# Patient Record
Sex: MALE | Race: WHITE | NOT HISPANIC OR LATINO | Employment: OTHER | URBAN - METROPOLITAN AREA
[De-identification: names, ages, dates, MRNs, and addresses within clinical notes are randomized per-mention and may not be internally consistent; named-entity substitution may affect disease eponyms.]

---

## 2018-05-04 ENCOUNTER — OFFICE VISIT CONVERTED (OUTPATIENT)
Dept: UROLOGY | Facility: CLINIC | Age: 73
End: 2018-05-04
Attending: UROLOGY

## 2018-05-07 ENCOUNTER — CONVERSION ENCOUNTER (OUTPATIENT)
Dept: SURGERY | Facility: CLINIC | Age: 73
End: 2018-05-07

## 2018-05-14 ENCOUNTER — OFFICE VISIT CONVERTED (OUTPATIENT)
Dept: SURGERY | Facility: CLINIC | Age: 73
End: 2018-05-14
Attending: PHYSICIAN ASSISTANT

## 2018-05-23 ENCOUNTER — PROCEDURE VISIT CONVERTED (OUTPATIENT)
Dept: UROLOGY | Facility: CLINIC | Age: 73
End: 2018-05-23
Attending: UROLOGY

## 2018-05-25 ENCOUNTER — TRANSCRIBE ORDERS (OUTPATIENT)
Dept: ADMINISTRATIVE | Facility: HOSPITAL | Age: 73
End: 2018-05-25

## 2018-05-25 DIAGNOSIS — Z79.52 CURRENT CHRONIC USE OF SYSTEMIC STEROIDS: Primary | ICD-10-CM

## 2018-05-30 PROBLEM — Z79.52 LONG TERM CURRENT USE OF SYSTEMIC STEROIDS: Status: ACTIVE | Noted: 2018-05-30

## 2018-06-01 ENCOUNTER — HOSPITAL ENCOUNTER (OUTPATIENT)
Dept: INFUSION THERAPY | Facility: HOSPITAL | Age: 73
Discharge: HOME | End: 2018-06-01
Attending: INTERNAL MEDICINE | Admitting: INTERNAL MEDICINE

## 2018-06-01 VITALS
DIASTOLIC BLOOD PRESSURE: 81 MMHG | RESPIRATION RATE: 12 BRPM | HEIGHT: 78 IN | TEMPERATURE: 97.7 F | OXYGEN SATURATION: 100 % | SYSTOLIC BLOOD PRESSURE: 158 MMHG | HEART RATE: 56 BPM | BODY MASS INDEX: 21.06 KG/M2 | WEIGHT: 182 LBS

## 2018-06-01 DIAGNOSIS — Z79.52 LONG TERM CURRENT USE OF SYSTEMIC STEROIDS: ICD-10-CM

## 2018-06-01 LAB
CORTIS SERPL-MCNC: 13.63 MCG/DL
CORTIS SERPL-MCNC: 14.57 MCG/DL
CORTIS SERPL-MCNC: 9.73 MCG/DL

## 2018-06-01 PROCEDURE — 96374 THER/PROPH/DIAG INJ IV PUSH: CPT

## 2018-06-01 PROCEDURE — 82533 TOTAL CORTISOL: CPT | Performed by: INTERNAL MEDICINE

## 2018-06-01 PROCEDURE — 36415 COLL VENOUS BLD VENIPUNCTURE: CPT

## 2018-06-01 PROCEDURE — 25010000002 COSYNTROPIN PER 0.25 MG: Performed by: INTERNAL MEDICINE

## 2018-06-01 RX ORDER — METOPROLOL TARTRATE 50 MG/1
50 TABLET, FILM COATED ORAL 2 TIMES DAILY
COMMUNITY
End: 2022-02-03 | Stop reason: SDUPTHER

## 2018-06-01 RX ORDER — COSYNTROPIN 0.25 MG/ML
0.25 INJECTION, POWDER, FOR SOLUTION INTRAMUSCULAR; INTRAVENOUS ONCE
Status: CANCELLED | OUTPATIENT
Start: 2018-06-01

## 2018-06-01 RX ORDER — WARFARIN SODIUM 5 MG/1
5 TABLET ORAL
COMMUNITY
End: 2021-09-10

## 2018-06-01 RX ORDER — COSYNTROPIN 0.25 MG/ML
0.25 INJECTION, POWDER, FOR SOLUTION INTRAMUSCULAR; INTRAVENOUS ONCE
Status: COMPLETED | OUTPATIENT
Start: 2018-06-01 | End: 2018-06-01

## 2018-06-01 RX ORDER — FOLIC ACID 1 MG/1
1 TABLET ORAL DAILY
COMMUNITY
End: 2022-02-03 | Stop reason: ALTCHOICE

## 2018-06-01 RX ORDER — WARFARIN SODIUM 2.5 MG/1
2.5 TABLET ORAL
COMMUNITY
End: 2021-09-10

## 2018-06-01 RX ORDER — PREDNISONE 1 MG/1
3 TABLET ORAL DAILY
COMMUNITY
End: 2021-08-30

## 2018-06-01 RX ADMIN — COSYNTROPIN 0.25 MG: 0.25 INJECTION, POWDER, FOR SOLUTION INTRAMUSCULAR; INTRAVENOUS at 08:20

## 2018-06-01 NOTE — PROGRESS NOTES
Patient tolerated stim test without complaints. Patient ambulatory, D/C'd from [facility] at 0944 with spouse.

## 2018-06-01 NOTE — PATIENT INSTRUCTIONS
ACTH Stimulation Test  Why am I having this test?  The adrenocorticotropic hormone (ACTH) stimulation test indirectly shows how well your adrenal glands are working. ACTH is a hormone that is produced by a gland in your brain called the pituitary gland. ACTH stimulates your two adrenal glands, which are located above each kidney. The adrenal glands produce hormones that are released into the blood. One of these hormones is cortisol. Cortisol helps your body to respond to stress. If your adrenal glands are not responding to ACTH properly, you may have too much or too little cortisol.  What kind of sample is taken?  Two or more blood samples are required for this test. Blood samples are usually collected by inserting a needle into a vein. Cortisol will be measured in the first blood sample to provide a baseline level.  After the first blood sample has been collected, you will be given cosyntropin. Cosyntropin is similar to ACTH and should cause the adrenal glands to release cortisol. Cosyntropin is usually given through an IV tube. It could also be given as an intramuscular (IM) injection. At specified intervals after receiving the cosyntropin, you will have one or more blood samples taken to measure your cortisol levels. The test results will be compared to show the amount of cortisol in your blood before and after you were given cosyntropin.  How do I prepare for this test?  Do not eat or drink anything after midnight on the night before the test or as directed by your health care provider.  What are the reference values?  Reference values are considered healthy values established after testing a large group of healthy people. Reference values may vary among different people, labs, and hospitals. It is your responsibility to obtain your test results. Ask the lab or department performing the test when and how you will get your results.  The following are reference values for the various ACTH stimulation  tests:  · Rapid test: cortisol levels increase greater than 7 mg/dL above baseline.  · 24-hour test: cortisol levels greater than 40 mcg/dL.  · 3-day test: cortisol levels greater than 40 mcg/dL.  What do the results mean?  Results outside of the reference value may indicate:  · Cushing syndrome.  · Adrenal insufficiency.  Talk with your health care provider to discuss your results, treatment options, and if necessary, the need for more tests. Talk with your health care provider if you have any questions about your results.  Talk with your health care provider to discuss your results, treatment options, and if necessary, the need for more tests. Talk with your health care provider if you have any questions about your results.  This information is not intended to replace advice given to you by your health care provider. Make sure you discuss any questions you have with your health care provider.  Document Released: 01/20/2012 Document Revised: 08/21/2017 Document Reviewed: 07/29/2015  [employer] Interactive Patient Education © 2017 [employer]

## 2018-06-21 ENCOUNTER — OFFICE VISIT (OUTPATIENT)
Dept: ORTHOPEDIC SURGERY | Facility: CLINIC | Age: 73
End: 2018-06-21

## 2018-06-21 VITALS — WEIGHT: 182 LBS | BODY MASS INDEX: 21.49 KG/M2 | HEIGHT: 77 IN

## 2018-06-21 DIAGNOSIS — M25.511 CHRONIC RIGHT SHOULDER PAIN: Primary | ICD-10-CM

## 2018-06-21 DIAGNOSIS — IMO0002 BURSITIS/TENDONITIS, SHOULDER: ICD-10-CM

## 2018-06-21 DIAGNOSIS — G89.29 CHRONIC RIGHT SHOULDER PAIN: Primary | ICD-10-CM

## 2018-06-21 PROCEDURE — 99204 OFFICE O/P NEW MOD 45 MIN: CPT | Performed by: ORTHOPAEDIC SURGERY

## 2018-06-21 PROCEDURE — 73030 X-RAY EXAM OF SHOULDER: CPT | Performed by: ORTHOPAEDIC SURGERY

## 2018-06-21 PROCEDURE — 20610 DRAIN/INJ JOINT/BURSA W/O US: CPT | Performed by: ORTHOPAEDIC SURGERY

## 2018-06-21 RX ORDER — LIDOCAINE HYDROCHLORIDE 10 MG/ML
4 INJECTION, SOLUTION EPIDURAL; INFILTRATION; INTRACAUDAL; PERINEURAL
Status: COMPLETED | OUTPATIENT
Start: 2018-06-21 | End: 2018-06-21

## 2018-06-21 RX ORDER — METHYLPREDNISOLONE ACETATE 80 MG/ML
80 INJECTION, SUSPENSION INTRA-ARTICULAR; INTRALESIONAL; INTRAMUSCULAR; SOFT TISSUE
Status: COMPLETED | OUTPATIENT
Start: 2018-06-21 | End: 2018-06-21

## 2018-06-21 RX ADMIN — METHYLPREDNISOLONE ACETATE 80 MG: 80 INJECTION, SUSPENSION INTRA-ARTICULAR; INTRALESIONAL; INTRAMUSCULAR; SOFT TISSUE at 11:34

## 2018-06-21 RX ADMIN — LIDOCAINE HYDROCHLORIDE 4 ML: 10 INJECTION, SOLUTION EPIDURAL; INFILTRATION; INTRACAUDAL; PERINEURAL at 11:34

## 2018-06-21 NOTE — PROGRESS NOTES
New Shoulder      Patient: Walker Ordoñez        YOB: 1945    Medical Record Number: [id number]        Chief Complaints: Right shoulder pain  Chief Complaint   Patient presents with   • Right Shoulder - Pain, Establish Care            History of Present Illness: This is a  72 y.o. male who presents with Complaints of right shoulder pain he is right-hand-dominant spent ongoing 2-3 months no history injury change in activity I'll operate on his left shoulder approximately 10 years ago it is doing great he has night pain which is his biggest complaint symptoms are moderate constant aching clicking popping snapping worse with driving and sleeping he is retired his past medical history marked for DVT hypertension and polymyalgia rheumatica          Allergies:   Allergies   Allergen Reactions   • Codeine GI Intolerance and Nausea Only     Sick at stomach       Medications:   Home Medications:  Current Outpatient Prescriptions on File Prior to Visit   Medication Sig   • folic acid (FOLVITE) 1 MG tablet Take 1 mg by mouth Daily.   • methotrexate 2.5 MG tablet Take 15 mg by mouth 1 (One) Time Per Week.   • metoprolol tartrate (LOPRESSOR) 50 MG tablet Take 50 mg by mouth 2 (Two) Times a Day.   • Multiple Vitamin (MULTI-VITAMIN DAILY PO) Take 1 tablet by mouth Daily.   • predniSONE (DELTASONE) 1 MG tablet Take 3 mg by mouth Daily.   • warfarin (COUMADIN) 2.5 MG tablet Take 2.5 mg by mouth Daily. Wednesday  & Friday   • warfarin (COUMADIN) 5 MG tablet Take 5 mg by mouth Daily. Monday Tuesday Thursday Saturday Sunday     No current facility-administered medications on file prior to visit.      Current Medications:  Scheduled Meds:  Continuous Infusions:  No current facility-administered medications for this visit.   PRN Meds:.    Past Medical History:   Diagnosis Date   • Hypertension    • PMR (polymyalgia rheumatica)         Past Surgical History:   Procedure Laterality Date   • BACK SURGERY     • ESOPHAGUS  "SURGERY      stretching   • KNEE SURGERY Right    • NECK SURGERY     • SHOULDER SURGERY Left         Social History     Occupational History   • Not on file.     Social History Main Topics   • Smoking status: Former Smoker   • Smokeless tobacco: Not on file      Comment: Quit 46 years ago   • Alcohol use Not on file   • Drug use: Unknown   • Sexual activity: Not on file    Social History     Social History Narrative   • No narrative on file      History reviewed. No pertinent family history.          Review of Systems: 14 point review of systems are remarkable for the shoulder pain only as well as some easy bruising and ringing in the ears to remainder are negative per the patient    Review of Systems      Physical Exam: 72 y.o. male  General Appearance:    Alert, cooperative, in no acute distress                 Vitals:    06/21/18 1118   Weight: 82.6 kg (182 lb)   Height: 195.6 cm (77\")      Patient is alert and read ×3 no acute distress appears her above-listed at height weight and age.  Affect is normal respiratory rate is normal unlabored. Heart rate regular rate rhythm, sclera, dentition and hearing are normal for the purpose of this exam.    Ortho Exam  Physical exam of the right shoulder reveals no overlying skin changes no lymphedema no lymphadenopathy.  Patient has active flexion 180 with mild symptoms abduction is similar external rotation is to 50 and internal rotation to the upper lumbar spine with mild symptoms.  Patient has good rotator cuff strength 4+ over 5 with isometric strength testing with pain.  Patient has a positive impingement and a positive Hazel sign.  Patient has good cervical range of motion which is full and asymptomatic no radicular symptoms.  Patient has a normal elbow exam.  Good distal pulses are present  Patient has pain with overhead activity and a positive Neer sign and a positive empty can sign , a positive drop arm and a definitive painful arc  Large Joint " Arthrocentesis  Date/Time: 6/21/2018 11:34 AM  Consent given by: patient  Site marked: site marked  Timeout: Immediately prior to procedure a time out was called to verify the correct patient, procedure, equipment, support staff and site/side marked as required   Supporting Documentation  Indications: pain   Procedure Details  Location: shoulder - R subacromial bursa  Preparation: Patient was prepped and draped in the usual sterile fashion  Needle size: 20 G  Approach: posterior  Medications administered: 80 mg methylPREDNISolone acetate 80 MG/ML; 4 mL lidocaine PF 1% 1 %  Patient tolerance: patient tolerated the procedure well with no immediate complications                Radiology:   AP, Scapular Y and Axillary Lateral of the right shoulder were ordered/reviewed to evauate shoulder pain.  I've no comparative films he has some acromioclavicular arthritis perhaps some mild erythema subacromial space no acute bony pathology  Imaging Results (most recent)     Procedure Component Value Units Date/Time    XR Shoulder 2+ View Right [[id number]] Resulted:  06/21/18 1117     Updated:  06/21/18 1117    Impression:       Ordering physician's impression is located in the Encounter Note dated 06/21/18. X-ray performed in the DR room.          Assessment/Plan:Right shoulder pain I think this is rotator cuff and some fashion probably impingement plan is proceed with an injection as a diagnostic and therapeutic tool he fails to improve we will pursue other means of testing  Cortisone Injection. See procedure note.  Cortisone Injection for DIAGNOSTIC and THERAPUTIC purposes.

## 2018-07-11 ENCOUNTER — OFFICE VISIT CONVERTED (OUTPATIENT)
Dept: UROLOGY | Facility: CLINIC | Age: 73
End: 2018-07-11
Attending: UROLOGY

## 2018-07-11 ENCOUNTER — CONVERSION ENCOUNTER (OUTPATIENT)
Dept: SURGERY | Facility: CLINIC | Age: 73
End: 2018-07-11

## 2019-02-04 ENCOUNTER — HOSPITAL ENCOUNTER (OUTPATIENT)
Dept: GENERAL RADIOLOGY | Facility: HOSPITAL | Age: 74
Discharge: HOME | End: 2019-02-04
Attending: INTERNAL MEDICINE | Admitting: INTERNAL MEDICINE

## 2019-02-04 DIAGNOSIS — M25.572 LEFT ANKLE PAIN, UNSPECIFIED CHRONICITY: ICD-10-CM

## 2019-02-04 PROCEDURE — 73610 X-RAY EXAM OF ANKLE: CPT

## 2019-02-12 ENCOUNTER — TRANSCRIBE ORDERS (OUTPATIENT)
Dept: ADMINISTRATIVE | Facility: HOSPITAL | Age: 74
End: 2019-02-12

## 2019-02-12 DIAGNOSIS — M25.572 LEFT ANKLE PAIN, UNSPECIFIED CHRONICITY: Primary | ICD-10-CM

## 2019-02-19 ENCOUNTER — HOSPITAL ENCOUNTER (OUTPATIENT)
Dept: MRI IMAGING | Facility: HOSPITAL | Age: 74
Discharge: HOME | End: 2019-02-19
Admitting: INTERNAL MEDICINE

## 2019-02-19 DIAGNOSIS — M25.572 LEFT ANKLE PAIN, UNSPECIFIED CHRONICITY: ICD-10-CM

## 2019-02-19 PROCEDURE — 73721 MRI JNT OF LWR EXTRE W/O DYE: CPT

## 2019-04-08 ENCOUNTER — TRANSCRIBE ORDERS (OUTPATIENT)
Dept: ADMINISTRATIVE | Facility: HOSPITAL | Age: 74
End: 2019-04-08

## 2019-04-08 DIAGNOSIS — Z79.52 LONG TERM CURRENT USE OF SYSTEMIC STEROIDS: Primary | ICD-10-CM

## 2019-04-29 ENCOUNTER — HOSPITAL ENCOUNTER (OUTPATIENT)
Dept: BONE DENSITY | Facility: HOSPITAL | Age: 74
Discharge: HOME | End: 2019-04-29
Admitting: INTERNAL MEDICINE

## 2019-04-29 DIAGNOSIS — Z79.52 LONG TERM CURRENT USE OF SYSTEMIC STEROIDS: ICD-10-CM

## 2019-04-29 PROCEDURE — 77080 DXA BONE DENSITY AXIAL: CPT

## 2019-04-30 ENCOUNTER — TRANSCRIBE ORDERS (OUTPATIENT)
Dept: ADMINISTRATIVE | Facility: HOSPITAL | Age: 74
End: 2019-04-30

## 2019-04-30 DIAGNOSIS — N39.0 URINARY TRACT INFECTION WITHOUT HEMATURIA, SITE UNSPECIFIED: Primary | ICD-10-CM

## 2019-05-07 ENCOUNTER — HOSPITAL ENCOUNTER (OUTPATIENT)
Dept: CT IMAGING | Facility: HOSPITAL | Age: 74
Discharge: HOME | End: 2019-05-07
Admitting: INTERNAL MEDICINE

## 2019-05-07 DIAGNOSIS — N39.0 URINARY TRACT INFECTION WITHOUT HEMATURIA, SITE UNSPECIFIED: ICD-10-CM

## 2019-05-07 LAB — CREAT BLDA-MCNC: 1 MG/DL (ref 0.6–1.3)

## 2019-05-07 PROCEDURE — 74178 CT ABD&PLV WO CNTR FLWD CNTR: CPT

## 2019-05-07 PROCEDURE — 82565 ASSAY OF CREATININE: CPT

## 2019-05-07 PROCEDURE — 25010000002 IOPAMIDOL 61 % SOLUTION: Performed by: INTERNAL MEDICINE

## 2019-05-07 RX ADMIN — IOPAMIDOL 85 ML: 612 INJECTION, SOLUTION INTRAVENOUS at 12:42

## 2019-05-28 ENCOUNTER — HOSPITAL ENCOUNTER (OUTPATIENT)
Dept: GENERAL RADIOLOGY | Facility: HOSPITAL | Age: 74
Discharge: HOME | End: 2019-05-28
Admitting: INTERNAL MEDICINE

## 2019-05-28 DIAGNOSIS — R91.8 LUNG FIELD ABNORMAL FINDING ON EXAMINATION: ICD-10-CM

## 2019-05-28 PROCEDURE — 71046 X-RAY EXAM CHEST 2 VIEWS: CPT

## 2019-06-04 ENCOUNTER — CONVERSION ENCOUNTER (OUTPATIENT)
Dept: ORTHOPEDIC SURGERY | Facility: CLINIC | Age: 74
End: 2019-06-04

## 2019-06-04 ENCOUNTER — OFFICE VISIT CONVERTED (OUTPATIENT)
Dept: ORTHOPEDIC SURGERY | Facility: CLINIC | Age: 74
End: 2019-06-04
Attending: PHYSICIAN ASSISTANT

## 2019-06-07 ENCOUNTER — HOSPITAL ENCOUNTER (OUTPATIENT)
Dept: OTHER | Facility: HOSPITAL | Age: 74
Discharge: HOME | End: 2019-06-07
Attending: ORTHOPAEDIC SURGERY

## 2019-06-07 LAB
APPEARANCE UR: ABNORMAL
BILIRUB UR QL: NEGATIVE
COLOR UR: ABNORMAL
CONV BACTERIA: ABNORMAL
CONV COLLECTION SOURCE (UA): ABNORMAL
CONV HYALINE CASTS IN URINE MICRO: ABNORMAL /[LPF]
CONV UROBILINOGEN IN URINE BY AUTOMATED TEST STRIP: 0.2 {EHRLICHU}/DL (ref 0.1–1)
GLUCOSE UR QL: NEGATIVE MG/DL
HGB UR QL STRIP: ABNORMAL
KETONES UR QL STRIP: NEGATIVE MG/DL
LEUKOCYTE ESTERASE UR QL STRIP: ABNORMAL
NITRITE UR QL STRIP: POSITIVE
PH UR STRIP.AUTO: 5.5 [PH] (ref 5–8)
PROT UR QL: 30 MG/DL
RBC #/AREA URNS HPF: ABNORMAL /[HPF]
SP GR UR: 1.02 (ref 1–1.03)
WBC #/AREA URNS HPF: ABNORMAL /[HPF]

## 2019-06-09 LAB
AMOXICILLIN+CLAV SUSC ISLT: <=2
AMPICILLIN SUSC ISLT: <=2
AMPICILLIN+SULBAC SUSC ISLT: <=2
BACTERIA UR CULT: ABNORMAL
CEFAZOLIN SUSC ISLT: <=4
CEFEPIME SUSC ISLT: <=1
CEFTAZIDIME SUSC ISLT: <=1
CEFTRIAXONE SUSC ISLT: <=1
CEFUROXIME ORAL SUSC ISLT: 4
CEFUROXIME PARENTER SUSC ISLT: 4
CIPROFLOXACIN SUSC ISLT: <=0.25
ERTAPENEM SUSC ISLT: <=0.5
GENTAMICIN SUSC ISLT: <=1
LEVOFLOXACIN SUSC ISLT: <=0.12
NITROFURANTOIN SUSC ISLT: <=16
TETRACYCLINE SUSC ISLT: <=1
TMP SMX SUSC ISLT: <=20
TOBRAMYCIN SUSC ISLT: <=1

## 2019-07-03 ENCOUNTER — HOSPITAL ENCOUNTER (OUTPATIENT)
Dept: PHYSICAL THERAPY | Facility: CLINIC | Age: 74
Setting detail: RECURRING SERIES
Discharge: HOME | End: 2019-07-22
Attending: ORTHOPAEDIC SURGERY

## 2019-07-16 ENCOUNTER — CONVERSION ENCOUNTER (OUTPATIENT)
Dept: ORTHOPEDIC SURGERY | Facility: CLINIC | Age: 74
End: 2019-07-16

## 2019-07-16 ENCOUNTER — OFFICE VISIT CONVERTED (OUTPATIENT)
Dept: ORTHOPEDIC SURGERY | Facility: CLINIC | Age: 74
End: 2019-07-16
Attending: PHYSICIAN ASSISTANT

## 2019-08-06 ENCOUNTER — HOSPITAL ENCOUNTER (OUTPATIENT)
Dept: OTHER | Facility: HOSPITAL | Age: 74
Discharge: HOME | End: 2019-08-06

## 2019-08-06 LAB
25(OH)D3 SERPL-MCNC: 30.4 NG/ML (ref 30–100)
ALBUMIN SERPL-MCNC: 3 G/DL (ref 3.5–5)
ALBUMIN/GLOB SERPL: 1.1 {RATIO} (ref 1.4–2.6)
ALP SERPL-CCNC: 41 U/L (ref 56–155)
ALT SERPL-CCNC: 17 U/L (ref 10–40)
ANION GAP SERPL CALC-SCNC: 18 MMOL/L (ref 8–19)
AST SERPL-CCNC: 52 U/L (ref 15–50)
BASOPHILS # BLD AUTO: 0.04 10*3/UL (ref 0–0.2)
BASOPHILS NFR BLD AUTO: 0.3 % (ref 0–3)
BILIRUB SERPL-MCNC: 0.8 MG/DL (ref 0.2–1.3)
BUN SERPL-MCNC: 14 MG/DL (ref 5–25)
BUN/CREAT SERPL: 19 {RATIO} (ref 6–20)
CALCIUM SERPL-MCNC: 9.4 MG/DL (ref 8.7–10.4)
CHLORIDE SERPL-SCNC: 101 MMOL/L (ref 99–111)
CONV ABS IMM GRAN: 0.12 10*3/UL (ref 0–0.2)
CONV CO2: 24 MMOL/L (ref 22–32)
CONV IMMATURE GRAN: 0.9 % (ref 0–1.8)
CONV TOTAL PROTEIN: 5.8 G/DL (ref 6.3–8.2)
CREAT UR-MCNC: 0.72 MG/DL (ref 0.7–1.2)
DEPRECATED RDW RBC AUTO: 50.2 FL (ref 35.1–43.9)
EOSINOPHIL # BLD AUTO: 0.36 10*3/UL (ref 0–0.7)
EOSINOPHIL # BLD AUTO: 2.8 % (ref 0–7)
ERYTHROCYTE [DISTWIDTH] IN BLOOD BY AUTOMATED COUNT: 14 % (ref 11.6–14.4)
GFR SERPLBLD BASED ON 1.73 SQ M-ARVRAT: >60 ML/MIN/{1.73_M2}
GLOBULIN UR ELPH-MCNC: 2.8 G/DL (ref 2–3.5)
GLUCOSE SERPL-MCNC: 102 MG/DL (ref 70–99)
HCT VFR BLD AUTO: 27.5 % (ref 42–52)
HGB BLD-MCNC: 8.8 G/DL (ref 14–18)
LYMPHOCYTES # BLD AUTO: 1.19 10*3/UL (ref 1–5)
LYMPHOCYTES NFR BLD AUTO: 9.4 % (ref 20–45)
MCH RBC QN AUTO: 32 PG (ref 27–31)
MCHC RBC AUTO-ENTMCNC: 32 G/DL (ref 33–37)
MCV RBC AUTO: 100 FL (ref 80–96)
MONOCYTES # BLD AUTO: 1.19 10*3/UL (ref 0.2–1.2)
MONOCYTES NFR BLD AUTO: 9.4 % (ref 3–10)
NEUTROPHILS # BLD AUTO: 9.8 10*3/UL (ref 2–8)
NEUTROPHILS NFR BLD AUTO: 77.2 % (ref 30–85)
NRBC CBCN: 0 % (ref 0–0.7)
OSMOLALITY SERPL CALC.SUM OF ELEC: 287 MOSM/KG (ref 273–304)
PLATELET # BLD AUTO: 290 10*3/UL (ref 130–400)
PMV BLD AUTO: 10.1 FL (ref 9.4–12.4)
POTASSIUM SERPL-SCNC: 4.5 MMOL/L (ref 3.5–5.3)
RBC # BLD AUTO: 2.75 10*6/UL (ref 4.7–6.1)
SODIUM SERPL-SCNC: 138 MMOL/L (ref 135–147)
WBC # BLD AUTO: 12.7 10*3/UL (ref 4.8–10.8)

## 2019-08-12 ENCOUNTER — HOSPITAL ENCOUNTER (OUTPATIENT)
Dept: OTHER | Facility: HOSPITAL | Age: 74
Discharge: HOME | End: 2019-08-12

## 2019-08-12 LAB
ANION GAP SERPL CALC-SCNC: 18 MMOL/L (ref 8–19)
BASOPHILS # BLD AUTO: 0.07 10*3/UL (ref 0–0.2)
BASOPHILS NFR BLD AUTO: 0.7 % (ref 0–3)
BUN SERPL-MCNC: 10 MG/DL (ref 5–25)
BUN/CREAT SERPL: 13 {RATIO} (ref 6–20)
C DIFF TOX B STL QL CT TISS CULT: NEGATIVE
CALCIUM SERPL-MCNC: 8.5 MG/DL (ref 8.7–10.4)
CHLORIDE SERPL-SCNC: 105 MMOL/L (ref 99–111)
CONV 027 TOXIN: NEGATIVE
CONV ABS IMM GRAN: 0.37 10*3/UL (ref 0–0.2)
CONV CO2: 23 MMOL/L (ref 22–32)
CONV IMMATURE GRAN: 3.6 % (ref 0–1.8)
CREAT UR-MCNC: 0.77 MG/DL (ref 0.7–1.2)
DEPRECATED RDW RBC AUTO: 51.8 FL (ref 35.1–43.9)
EOSINOPHIL # BLD AUTO: 0.62 10*3/UL (ref 0–0.7)
EOSINOPHIL # BLD AUTO: 6 % (ref 0–7)
ERYTHROCYTE [DISTWIDTH] IN BLOOD BY AUTOMATED COUNT: 14.4 % (ref 11.6–14.4)
GFR SERPLBLD BASED ON 1.73 SQ M-ARVRAT: >60 ML/MIN/{1.73_M2}
GLUCOSE SERPL-MCNC: 82 MG/DL (ref 70–99)
HCT VFR BLD AUTO: 27 % (ref 42–52)
HGB BLD-MCNC: 8.3 G/DL (ref 14–18)
LYMPHOCYTES # BLD AUTO: 2.03 10*3/UL (ref 1–5)
LYMPHOCYTES NFR BLD AUTO: 19.8 % (ref 20–45)
MCH RBC QN AUTO: 30.7 PG (ref 27–31)
MCHC RBC AUTO-ENTMCNC: 30.7 G/DL (ref 33–37)
MCV RBC AUTO: 100 FL (ref 80–96)
MONOCYTES # BLD AUTO: 0.85 10*3/UL (ref 0.2–1.2)
MONOCYTES NFR BLD AUTO: 8.3 % (ref 3–10)
NEUTROPHILS # BLD AUTO: 6.31 10*3/UL (ref 2–8)
NEUTROPHILS NFR BLD AUTO: 61.6 % (ref 30–85)
NRBC CBCN: 0 % (ref 0–0.7)
OSMOLALITY SERPL CALC.SUM OF ELEC: 292 MOSM/KG (ref 273–304)
PLATELET # BLD AUTO: 551 10*3/UL (ref 130–400)
PMV BLD AUTO: 9.3 FL (ref 9.4–12.4)
POTASSIUM SERPL-SCNC: 4.3 MMOL/L (ref 3.5–5.3)
RBC # BLD AUTO: 2.7 10*6/UL (ref 4.7–6.1)
SODIUM SERPL-SCNC: 142 MMOL/L (ref 135–147)
WBC # BLD AUTO: 10.25 10*3/UL (ref 4.8–10.8)

## 2019-10-16 ENCOUNTER — HOSPITAL ENCOUNTER (OUTPATIENT)
Dept: PHYSICAL THERAPY | Facility: CLINIC | Age: 74
Setting detail: RECURRING SERIES
Discharge: HOME | End: 2020-01-09

## 2019-11-08 ENCOUNTER — OFFICE VISIT CONVERTED (OUTPATIENT)
Dept: ORTHOPEDIC SURGERY | Facility: CLINIC | Age: 74
End: 2019-11-08
Attending: ORTHOPAEDIC SURGERY

## 2020-02-03 ENCOUNTER — HOSPITAL ENCOUNTER (OUTPATIENT)
Dept: GENERAL RADIOLOGY | Facility: HOSPITAL | Age: 75
Discharge: HOME | End: 2020-02-03
Attending: UROLOGY

## 2020-02-07 ENCOUNTER — OFFICE VISIT CONVERTED (OUTPATIENT)
Dept: UROLOGY | Facility: CLINIC | Age: 75
End: 2020-02-07
Attending: UROLOGY

## 2020-02-11 ENCOUNTER — OFFICE VISIT CONVERTED (OUTPATIENT)
Dept: ORTHOPEDIC SURGERY | Facility: CLINIC | Age: 75
End: 2020-02-11
Attending: PHYSICIAN ASSISTANT

## 2020-06-08 ENCOUNTER — HOSPITAL ENCOUNTER (OUTPATIENT)
Dept: LAB | Facility: HOSPITAL | Age: 75
Discharge: HOME | End: 2020-06-08
Attending: UROLOGY

## 2020-06-08 LAB
ANION GAP SERPL CALC-SCNC: 15 MMOL/L (ref 8–19)
BUN SERPL-MCNC: 14 MG/DL (ref 5–25)
BUN/CREAT SERPL: 15 {RATIO} (ref 6–20)
CALCIUM SERPL-MCNC: 9.7 MG/DL (ref 8.7–10.4)
CHLORIDE SERPL-SCNC: 102 MMOL/L (ref 99–111)
CONV CO2: 27 MMOL/L (ref 22–32)
CREAT UR-MCNC: 0.92 MG/DL (ref 0.7–1.2)
GFR SERPLBLD BASED ON 1.73 SQ M-ARVRAT: >60 ML/MIN/{1.73_M2}
GLUCOSE SERPL-MCNC: 91 MG/DL (ref 70–99)
OSMOLALITY SERPL CALC.SUM OF ELEC: 288 MOSM/KG (ref 273–304)
POTASSIUM SERPL-SCNC: 4.6 MMOL/L (ref 3.5–5.3)
SODIUM SERPL-SCNC: 139 MMOL/L (ref 135–147)
URATE SERPL-MCNC: 5.9 MG/DL (ref 3.5–8.5)

## 2020-06-16 ENCOUNTER — TELEPHONE CONVERTED (OUTPATIENT)
Dept: UROLOGY | Facility: CLINIC | Age: 75
End: 2020-06-16
Attending: UROLOGY

## 2020-08-04 ENCOUNTER — OFFICE VISIT CONVERTED (OUTPATIENT)
Dept: ORTHOPEDIC SURGERY | Facility: CLINIC | Age: 75
End: 2020-08-04
Attending: ORTHOPAEDIC SURGERY

## 2020-08-04 ENCOUNTER — CONVERSION ENCOUNTER (OUTPATIENT)
Dept: ORTHOPEDIC SURGERY | Facility: CLINIC | Age: 75
End: 2020-08-04

## 2020-09-11 ENCOUNTER — OFFICE VISIT CONVERTED (OUTPATIENT)
Dept: CARDIOLOGY | Facility: CLINIC | Age: 75
End: 2020-09-11
Attending: SPECIALIST

## 2020-09-17 ENCOUNTER — CONVERSION ENCOUNTER (OUTPATIENT)
Dept: CARDIOLOGY | Facility: CLINIC | Age: 75
End: 2020-09-17
Attending: SPECIALIST

## 2020-09-23 ENCOUNTER — HOSPITAL ENCOUNTER (OUTPATIENT)
Dept: NUCLEAR MEDICINE | Facility: HOSPITAL | Age: 75
Discharge: HOME | End: 2020-09-23
Attending: SPECIALIST

## 2020-11-03 ENCOUNTER — CONVERSION ENCOUNTER (OUTPATIENT)
Dept: CARDIOLOGY | Facility: CLINIC | Age: 75
End: 2020-11-03

## 2020-11-03 ENCOUNTER — OFFICE VISIT CONVERTED (OUTPATIENT)
Dept: CARDIOLOGY | Facility: CLINIC | Age: 75
End: 2020-11-03
Attending: SPECIALIST

## 2021-01-04 ENCOUNTER — CONVERSION ENCOUNTER (OUTPATIENT)
Dept: FAMILY MEDICINE CLINIC | Facility: CLINIC | Age: 76
End: 2021-01-04

## 2021-01-04 ENCOUNTER — OFFICE VISIT CONVERTED (OUTPATIENT)
Dept: FAMILY MEDICINE CLINIC | Facility: CLINIC | Age: 76
End: 2021-01-04
Attending: FAMILY MEDICINE

## 2021-02-03 ENCOUNTER — HOSPITAL ENCOUNTER (OUTPATIENT)
Dept: VACCINE CLINIC | Facility: HOSPITAL | Age: 76
Discharge: HOME | End: 2021-02-03
Attending: INTERNAL MEDICINE

## 2021-02-23 ENCOUNTER — HOSPITAL ENCOUNTER (OUTPATIENT)
Dept: GENERAL RADIOLOGY | Facility: HOSPITAL | Age: 76
Discharge: HOME | End: 2021-02-23
Attending: UROLOGY

## 2021-02-26 ENCOUNTER — OFFICE VISIT CONVERTED (OUTPATIENT)
Dept: UROLOGY | Facility: CLINIC | Age: 76
End: 2021-02-26
Attending: UROLOGY

## 2021-03-04 ENCOUNTER — HOSPITAL ENCOUNTER (OUTPATIENT)
Dept: VACCINE CLINIC | Facility: HOSPITAL | Age: 76
Discharge: HOME | End: 2021-03-04
Attending: INTERNAL MEDICINE

## 2021-03-04 ENCOUNTER — OFFICE VISIT CONVERTED (OUTPATIENT)
Dept: FAMILY MEDICINE CLINIC | Facility: CLINIC | Age: 76
End: 2021-03-04
Attending: FAMILY MEDICINE

## 2021-04-02 ENCOUNTER — OFFICE VISIT CONVERTED (OUTPATIENT)
Dept: CARDIOLOGY | Facility: CLINIC | Age: 76
End: 2021-04-02
Attending: SPECIALIST

## 2021-04-28 ENCOUNTER — HOSPITAL ENCOUNTER (OUTPATIENT)
Dept: URGENT CARE | Facility: CLINIC | Age: 76
Discharge: HOME | End: 2021-04-28
Attending: PHYSICIAN ASSISTANT

## 2021-05-03 ENCOUNTER — OFFICE VISIT CONVERTED (OUTPATIENT)
Dept: FAMILY MEDICINE CLINIC | Facility: CLINIC | Age: 76
End: 2021-05-03
Attending: FAMILY MEDICINE

## 2021-05-03 ENCOUNTER — HOSPITAL ENCOUNTER (OUTPATIENT)
Dept: FAMILY MEDICINE CLINIC | Facility: CLINIC | Age: 76
Discharge: HOME | End: 2021-05-03
Attending: FAMILY MEDICINE

## 2021-05-03 LAB
CHOLEST SERPL-MCNC: 162 MG/DL (ref 107–200)
CHOLEST/HDLC SERPL: 5.2 {RATIO} (ref 3–6)
HDLC SERPL-MCNC: 31 MG/DL (ref 40–60)
LDLC SERPL CALC-MCNC: 77 MG/DL (ref 70–100)
TRIGL SERPL-MCNC: 271 MG/DL (ref 40–150)
VLDLC SERPL-MCNC: 54 MG/DL (ref 5–37)

## 2021-05-10 NOTE — H&P
History and Physical      Patient Name: Walker Ordoñez   Patient ID: [id number]   Sex: Male   YOB: 1945    Primary Care Provider: Odessa Jones MD   Referring Provider: Odessa Jones MD    Visit Date: January 4, 2021    Provider: Tanvir Garcia DO   Location: [facility]   Location Address: [address] [place] [address]  [id number]   Location Phone: [phone]          Chief Complaint  · New Patient - [facility] ( last pcp Dr. Angella Jones in [place] )   · pt states he has had both pneumonia vaccines but unsure of dates      History Of Present Illness  Walker Orodñez is a 75 year old /White male who presents to establish care. He has a h/o HTN, PMR, and DVT/PE. He states that he checks his BP and it is 150/70's. He has not seen a RHeumatologist in years, He has been able to get down to no less then 4mg Prednisone daily w/o a flare.      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 He states that he had a w/u years ago.       Past Medical History  Disease Name Date Onset Notes   Anxiety --  --    Arthritis --  --    Blood Clotting Disorder --  04/27/2017 - 6 yrs ago pt had blood clot TJ   Broken Bones --  --    Depression --  --    Hemorrhoid --  --    Hypertension --  --    Kidney stone --  --    Nephrolithiasis --  --    Polymyalgia rheumatica --  --    Pulmonary embolism --  --          Past Surgical History  Procedure Name Date Notes   Artificial Joints/Limbs --  --    Back --  --    Back Surgery, Lumbar --  --    Colonoscopy 2019 --    Femur Fracture 2019 left   Hernia --  --    Hip fracture repair, left 2019 --    Knee surgery 2005 --    shoulder repair 2000 left tear          Medication List  Name Date Started Instructions   Eliquis 5 mg oral tablet  take 1 tablet (5 mg) by oral route 2 times per day   lisinopril 2.5 mg oral tablet  take 1 tablet (2.5 mg) by oral route once daily   metoprolol tartrate 50 mg oral tablet  take  "1 tablet by oral route daily   multivitamin oral  --    prednisone 1 mg oral tablet  take 1 tablet (1 mg) by oral route 4 times per day   sertraline 25 mg oral tablet  take 1 tablet (25 mg) by oral route once daily         Allergy List  Allergen Name Date Reaction Notes   Codiene --  --  --          Family Medical History  Disease Name Relative/Age Notes   *No Known Family History  --    Family history of Arthritis Mother/   Mother   Family history of cancer Mother/   Leukemia   Family history of heart disease Brother/   --          Social History  Finding Status Start/Stop Quantity Notes   Alcohol Never --/-- --  01/04/2021 -    lives with spouse --  --/-- --  --    . --  --/-- --  --    Recreational Drug Use Never --/-- --  no   Retired. --  --/-- --  --    Tobacco Former --/1972 1 ppd former smoker         Immunizations  NameDate Admin Mfg Trade Name Lot Number Route Inj VIS Given VIS Publication   Tzqujkkmy11/01/2020 [employer] Fluarix, quadrivalent, preservative free [id number] NE NE 10/01/2020    Comments: antionette         Review of Systems  · Constitutional  o Denies  o : fatigue  · Eyes  o Denies  o : blurred vision, changes in vision  · HENT  o Denies  o : headaches  · Cardiovascular  o Denies  o : chest pain, irregular heart beats, rapid heart rate  · Respiratory  o Denies  o : shortness of breath, wheezing, cough  · Musculoskeletal  o Denies  o : muscle pain      Vitals  Date Time BP Position Site L\R Cuff Size HR RR TEMP (F) WT  HT  BMI kg/m2 BSA m2 O2 Sat FR L/min FiO2 HC       01/04/2021 02:21 /120 Sitting    69 - R  97.9 191lbs 2oz 6'  4\" 23.26 2.16 100 %  21%    01/04/2021 03:06 /80 Sitting                       Physical Examination  · Constitutional  o Appearance  o : well-nourished, well developed, alert, in no acute distress, well-tended appearance  · Head and Face  o Head  o :   § Inspection  § : atraumatic, normocephalic  o Face  o :   § Inspection  § : no facial lesions  o HEENT  o : " Unremarkable  · Eyes  o Conjunctivae  o : conjunctivae normal  o Sclerae  o : sclerae white  o Pupils and Irises  o : pupils equal and round, pupils reactive to light bilaterally  o Eyelids/Ocular Adnexae  o : eyelid appearance normal  · Ears, Nose, Mouth and Throat  o Ears  o :   § External Ears  § : appearance within normal limits, no lesions present  § Otoscopic Examination  § : tympanic membrane appearance within normal limits bilaterally without perforations, mobility normal  o Nose  o :   § External Nose  § : appearance normal  o Oral Cavity  o :   § Oral Mucosa  § : oral mucosa normal  § Lips  § : lip appearance normal  § Teeth  § : normal dentition for age  § Gums  § : gums pink, non-swollen, no bleeding present  § Tongue  § : tongue appearance normal  § Palate  § : hard palate normal, soft palate appearance normal  o Throat  o :   § Oropharynx  § : no inflammation or lesions present, tonsils within normal limits  · Neck  o Inspection/Palpation  o : normal appearance, no masses or tenderness, trachea midline  o Thyroid  o : gland size normal, nontender, no nodules or masses present on palpation  · Respiratory  o Respiratory Effort  o : breathing unlabored  o Auscultation of Lungs  o : normal breath sounds  · Cardiovascular  o Heart  o :   § Auscultation of Heart  § : regular rate, normal rhythm, no murmurs present  o Peripheral Vascular System  o :   § Extremities  § : no edema  · Lymphatic  o Neck  o : no lymphadenopathy           Assessment  · Screening for depression     V79.0/Z13.89  · Hypertension     401.9/I10  HIs BP is elevated here today. Will re-check. He had labs just this past November.   · Blood Clotting Disorder     286.9/D68.9  04/27/2017 - 6 yrs ago pt had blood clot TJ  Will continue his Eliquis  · Polymyalgia rheumatica     725/M35.3  stable on 4mg daily.       Plan  · Orders  o ACO-18: Negative screen for clinical depression using a standardized tool () - V79.0/Z13.89 -  01/04/2021  o ACO-15: Pneumococcal Vaccine Administered or Previously Received [facility] (4040F) - - 01/04/2021  o ACO-14: Influenza immunization administered or previously received [facility] () - - 01/04/2021  o ACO-39: Current medications updated and reviewed (1159F, ) - - 01/04/2021  · Medications  o lisinopril 10 mg oral tablet   SIG: take 1 tablet (10 mg) by oral route once daily for 90 days   DISP: (90) Tablet with 0 refills  Adjusted on 01/04/2021     o Medications have been Reconciled  o Transition of Care or Provider Policy  · Instructions  o Depression Screen completed and scanned into the EMR under the designated folder within the patient's documents.  o Today's PHQ-9 result is 1  o Patient is taking medications as prescribed and doing well.   o Take all medications as prescribed/directed.  o Patient instructed/educated on their diet and exercise program.  o Patient was educated/instructed on their diagnosis, treatment and medications prior to discharge from the clinic today.  o Patient instructed to seek medical attention urgently for new or worsening symptoms.  o Call the office with any concerns or questions.  o Bring all medicines with their bottles to each office visit.  · Disposition  o Call or Return if symptoms worsen or persist.  o Return Visit Request in/on 2 months +/- 2 days ([id number]).            Electronically Signed by: Tanvir Garcia DO -Author on January 4, 2021 03:14:04 PM

## 2021-05-10 NOTE — H&P
History and Physical      Patient Name: Walker Ordoñez   Patient ID: [id number]   Sex: Male   YOB: 1945    Primary Care Provider: Odessa Jones MD   Referring Provider: Marcus Coello MD    Visit Date: August 4, 2020    Provider: Dayan Rodriguez MD   Location: [facility]   Location Address: [address] [place] [address]  [id number]   Location Phone: [phone]          Chief Complaint  · Right Shoulder Pain      History Of Present Illness  Walker Ordoñez is a 74 year old /White male who presents today to [facility].      The patient presents today for evaluation of right shoulder pain. Patient reports history of pain for a year. He denies specific injury or trauma. He points to the anterior shoulder as his main source of pain.               Past Medical History  Arthritis; Blood Clotting Disorder; Hypertension; Nephrolithiasis; Pulmonary embolism         Past Surgical History  Artificial Joints/Limbs; Back; Back Surgery, Lumbar; Colonoscopy; Femur Fracture; Hernia; Hip fracture repair, left; Knee surgery; shoulder repair         Medication List  Eliquis 5 mg oral tablet; metoprolol tartrate 50 mg oral tablet; multivitamin oral; prednisone 2.5 mg oral tablet         Allergy List  Codiene       Allergies Reconciled  Family Medical History  Heart Disease; *No Known Family History; Family history of Arthritis         Social History  Alcohol Use (Never); lives with spouse; .; Recreational Drug Use (Never); Retired.; Tobacco (Former)         Review of Systems  · Constitutional  o Denies  o : fever, chills, weight loss  · Cardiovascular  o Denies  o : chest pain, shortness of breath  · Gastrointestinal  o Denies  o : liver disease, heartburn, nausea, blood in stools  · Genitourinary  o Denies  o : painful urination, blood in urine  · Integument  o Denies  o : rash, itching  · Neurologic  o Denies  o : headache, weakness, loss of  "consciousness  · Musculoskeletal  o Denies  o : painful, swollen joints  · Psychiatric  o Denies  o : drug/alcohol addiction, anxiety, depression      Vitals  Date Time BP Position Site L\R Cuff Size HR RR TEMP (F) WT  HT  BMI kg/m2 BSA m2 O2 Sat HC       08/04/2020 02:20 PM      72 - R   192lbs 2oz 6'  4\" 23.39 2.16 97 %          Physical Examination  · Constitutional  o Appearance  o : well developed, well-nourished, no obvious deformities present  · Head and Face  o Head  o :   § Inspection  § : normocephalic  o Face  o :   § Inspection  § : no facial lesions  · Eyes  o Conjunctivae  o : conjunctivae normal  o Sclerae  o : sclerae white  · Ears, Nose, Mouth and Throat  o Ears  o :   § External Ears  § : appearance within normal limits  § Hearing  § : intact  o Nose  o :   § External Nose  § : appearance normal  · Neck  o Inspection/Palpation  o : normal appearance  o Range of Motion  o : full range of motion  · Respiratory  o Respiratory Effort  o : breathing unlabored  o Inspection of Chest  o : normal appearance  o Auscultation of Lungs  o : no audible wheezing or rales  · Cardiovascular  o Heart  o : regular rate  · Gastrointestinal  o Abdominal Examination  o : soft and non-tender  · Skin and Subcutaneous Tissue  o General Inspection  o : intact, no rashes  · Psychiatric  o General  o : Alert and oriented x3  o Judgement and Insight  o : judgment and insight intact  o Mood and Affect  o : mood normal, affect appropriate  · Right Shoulder  o Inspection  o : Near-full forward elevation, good abduction, IR to L5, Neurovascularly intact, tenderness over the anterior shoulder, mildly Positive impingement signs, pulses present  · Injection Note/Aspiration Note  o Site  o : right shoulder  o Procedure  o : Procedure: After educating the patient, patient gave consent for procedure. After using Chloraprep, the joint space was injected. The patient tolerated the procedure well.   o Medication  o : 80 mg of DepoMedrol " with 9cc of 1% Lidocaine  · In Office Procedures  o View  o : AP/LATERAL  o Site  o : right, shoulder   o Indication  o : Right shoulder pain   o Study  o : X-rays ordered, taken in the office, and reviewed today.  o Xray  o : Reveals some spurring of the shoulder, mild narrowing  o Comparative Data  o : No comparative data found          Assessment  · Right shoulder pain, unspecified chronicity     719.41/M25.511  · Impingement syndrome of shoulder     726.2/M75.40      Plan  · Orders  o Depo-Medrol injection 80mg () - - 08/04/2020   Lot [id number] Exp 06 2021 [employer] Administered by SHANNAN Rodriguez MD  o Shoulder Intra-articular Injection without US Guidance [facility] ([id number]) - - 08/04/2020   Lot [id number] Exp 08 01 2021 [employer] Administered by SHANNAN Rodriguez MD  o Scapula (Right) [facility] ([id number]) - 719.41/M25.511 - 08/04/2020  · Medications  o Medications have been Reconciled  o Transition of Care or Provider Policy  · Instructions  o Reviewed the patient's Past Medical, Social, and Family history as well as the ROS at today's visit, no changes.  o Call or return if worsening symptoms.  o X-ray ordered, taken and reviewed at this visit.  o Follow up as needed.  o The above service was scribed by Mally Navarro on my behalf and I attest to the accuracy of the note. mc  o Discussed conservative treatment with injections, medications and exercises. Plan for injection and home exercises, we recommended no overhead lifting. If he wishes to have a therapy order he will call.  o Electronically Identified Patient Education Materials Provided Electronically            Electronically Signed by: Mally Navarro-, Other -Author on August 6, 2020 10:43:12 PM  Electronically Co-signed by: Dayan Rodriguez MD -Reviewer on August 7, 2020 10:38:21 AM

## 2021-05-10 NOTE — H&P
History and Physical      Patient Name: Walker Ordoñez   Patient ID: [id number]   Sex: Male   YOB: 1945    Primary Care Provider: Odessa Jones MD   Referring Provider: Filipe Bauman MD    Visit Date: September 11, 2020    Provider: Bimal Rivera MD   Location: [facility]   Location Address: [address] [place] [address], [facility]   [place], [place]  [id number]   Location Phone: [phone]          Chief Complaint     Chest pain.       History Of Present Illness  Consult requested by: Filipe Bauman MD   Walker Ordoñez is a 74 year old /White male with a history of chest pain on and off for the last few weeks, substernal, aching, lasts for a few minutes. He had some persistent chest pain a few weeks ago, and went to the emergency room, evaluated, and discharged. He continues to have some chest pain occasionally. No aggravating or alleviating factors. No associated nausea or vomiting. No diaphoresis. Cardiac risk factors: History of hypertension positive. Family history for heart disease positive. No history of diabetes mellitus. No history of smoking. History of hyperlipidemia is negative.   PAST MEDICAL HISTORY: Arthritis; Blood Clotting Disorder; Hypertension; Nephrolithiasis; Pulmonary embolism.   FAMILY HISTORY: Positive for hypertension and heart disease. Negative for diabetes mellitus.   PSYCHOSOCIAL HISTORY: Denies alcohol or tobacco use. Moderate caffeine intake. . Admits mood changes and depression.   CURRENT MEDICATIONS: Eliquis 5 mg b.i.d.; metoprolol ER 50 mg daily; multivitamin; prednisone; sertraline 25 mg daily; lisinopril 2.5 mg daily.   ALLERGIES: CODEINE.   CHOLESTEROL STATUS: Unknown.       Review of Systems  · Constitutional  o Admits  o : good general health lately  o Denies  o : fatigue, recent weight changes   · Eyes  o Denies  o : double vision  · HENT  o Admits  o : hearing loss or ringing  o Denies  o : chronic sinus problem, swollen glands in  "neck  · Cardiovascular  o Admits  o : chest pain  o Denies  o : palpitations (fast, fluttering, or skipping beats), swelling (feet, ankles, hands), shortness of breath while walking or lying flat  · Respiratory  o Denies  o : asthma or wheezing, COPD  · Gastrointestinal  o Denies  o : ulcers, nausea or vomiting  · Neurologic  o Admits  o : headaches  o Denies  o : lightheaded or dizzy, stroke  · Musculoskeletal  o Admits  o : joint pain  o Denies  o : back pain  · Endocrine  o Denies  o : thyroid disease, diabetes, heat or cold intolerance, excessive thirst or urination  · Heme-Lymph  o Admits  o : bleeding or bruising tendency  o Denies  o : anemia      Vitals  Date Time BP Position Site L\R Cuff Size HR RR TEMP (F) WT  HT  BMI kg/m2 BSA m2 O2 Sat HC       09/11/2020 10:39 /88 Sitting    62 - R   189lbs 0oz 6'  4\" 23.01 2.14     09/11/2020 10:39 /88 Sitting    64 - R                 Physical Examination  · Constitutional  o Appearance  o : Awake, alert, cooperative, pleasant.  · Eyes  o Pupils and Irises  o : Pupils equal and reacting to light and accommodation.  · Ears, Nose, Mouth and Throat  o Ears  o : Tympanic membranes are normal.  o Nose  o : Clear with no maxillary tenderness.  o Oral Cavity  o : Clear.  · Neck  o Inspection/Palpation  o : No JVD, bruits, thyromegaly, or adenopathy. Trachea midline. No axillary or cervical lymphadenopathy.  o Thyroid  o : No thyroid enlargement.   · Respiratory  o Inspection of Chest  o : No chest wall deformities, moving equal.  o Auscultation of Lungs  o : Good air entry with vesicular breath sounds.  o Percussion of Chest  o : Resonant bilaterally.   o Palpation of Chest  o : Equal movements bilaterally.  · Cardiovascular  o Heart  o :   § Auscultation of Heart  § : PMI is in the 5th intercostal space. S1 and S2 regular. No S3. No S4.   o Peripheral Vascular System  o :   § Extremities  § : No pedal edema. Peripheral pulses well felt. No " cyanosis.  · Gastrointestinal  o Abdominal Examination  o : No organomegaly, masses, tenderness, bruits, or abnormal pulsations. Bowel sounds are normal.  · Musculoskeletal  o General  o : Muscle strength is normal with normal tone.  · Skin and Subcutaneous Tissue  o General Inspection  o : No rash, petechiae, or suspicious skin lesions. Skin turgor is normal.  · EKG  o EKG  o : EKG from the ER was reviewed, which shows sinus rhythm, right axis deviation, left ventricular hypertrophy, and inferior wall MI, age-indeterminate.          Assessment     ASSESSMENT & PLAN:    1.  Precordial chest pain and abnormal EKG.  In view of his chest pain and abnormal EKG, we will do a        sestamibi stress test to rule out any significant ischemia and an echocardiogram to evaluate the left        ventricular systolic function.    2.  History of pulmonary embolism.  Continue Eliquis.  3.  Essential hypertension, controlled.  Continue lisinopril.  Monitor blood pressure regularly.  Increase lisinopril       depending upon his blood pressure.          Bimal Rivera MD, FACC  OPAL:vm             Electronically Signed by: Jasmin Ram-, Other -Author on September 16, 2020 07:10:35 AM  Electronically Co-signed by: Bimal Rivera MD -Reviewer on September 28, 2020 09:28:18 AM

## 2021-05-10 NOTE — PROCEDURES
"   Procedure Note      Patient Name: Walker Ordoñez   Patient ID: [id number]   Sex: Male   YOB: 1945    Primary Care Provider: Odessa Jones MD   Referring Provider: Odessa Jones MD    Visit Date: September 17, 2020    Provider: Bimal Rivera MD   Location: [facility]   Location Address: [address] [place] [address], [address]   [place]  [id number]   Location Phone: [phone]          FINAL REPORT   TRANSTHORACIC ECHOCARDIOGRAM REPORT    Diagnosis: Chest pain, precordial   Height: 6'4\" Weight: 189 B/P: 174/68 BSA: 2.2   Tech: JTW   MEASUREMENTS:  RVID (Diastole) : RVID. (NORMAL: 0.7 to 2.4 cm max)   LVID (Systole): 2.8 cm (Diastole): 4.3 cm . (NORMAL: 3.7 - 5.4 cm)   Posterior Wall Thickness (Diastole): 1.2 cm. (NORMAL: 0.8 - 1.1 cm)   Septal Thickness (Diastole): 1.2 cm. (NORMAL: 0.7 - 1.2 cm)   LAID (Systole): 3.4 cm. (NORMAL: 1.9 - 3.8 cm)   Aortic Root Diameter (Diastole): 3.3 cm. (NORMAL: 2.0 - 3.7 cm)   DOPPLER:  E/A ratio 0.8 (NORMAL 0.8-2.0)   DT: 306 msec (NORMAL 140-240 msec.)   IVRT 77 m/sec (NORMAL  m/sec.)   E/E': 9 (NORMAL <8 avg.)   COMMENTS:  The patient underwent 2-D, M-Mode, and Doppler examination, including pulse-wave, continuous-wave, and color-flow analysis; the study is technically adequate.   FINDINGS:  AORTIC VALVE: Normal. Tricuspid in appearance with normal central closure.   MITRAL VALVE: Normal. Bicuspid in appearance.   TRICUSPID VALVE: Normal.   PULMONIC VALVE: Not well visualized.   LEFT ATRIUM: Normal. No intracavitary masses or clots seen. LA volume index is 25 mL/m2.   AORTIC ROOT: Normal in size with adequate motion.   LEFT VENTRICLE: Normal left ventricular systolic function. Ejection fraction 62%. There is underlying mild left ventricular hypertrophy.   RIGHT ATRIUM: Normal.   RIGHT VENTRICLE: Normal size and function.   PERICARDIUM: Unremarkable. No evidence of effusion.   INFERIOR VENA CAVA: Diameter is 1.3 cm.   DOPPLER: Doppler " examination of the aortic, mitral, tricuspid, and pulmonary valves was performed. Normal pulmonary artery systolic pressure by Doppler. There is trace mitral regurgitation.   Faxed: 09/18/2020      CONCLUSION:  1.  Mild left ventricular hypertrophy with normal left ventricular systolic function.   2.  Trace mitral regurgitation.      MD OPAL Miller/shira    This note was transcribed by Blanca Davis.  shira/OPAL  The above service was transcribed by Blanca Davis, and I attest to the accuracy of the note.  OPAL                 Electronically Signed by: Tatiana Davis-, Other -Author on September 18, 2020 08:34:20 AM  Electronically Co-signed by: Bimal Rivera MD -Reviewer on September 24, 2020 09:50:32 AM

## 2021-05-13 NOTE — PROGRESS NOTES
Progress Note      Patient Name: Walker Ordoñez   Patient ID: [id number]   Sex: Male   YOB: 1945    Primary Care Provider: Odessa Jones MD   Referring Provider: Marcus Coello MD    Visit Date: June 16, 2020    Provider: Marcus Coello MD   Location: [facility]   Location Address: [address] [place] [address]  [id number]   Location Phone: [phone]          Chief Complaint  · pt here for urologic issues      History Of Present Illness  TELEHEALTH TELEPHONE VISIT  Walker Ordoñez is a 74 year old /White male who is presenting for evaluation via telehealth telephone visit. Verbal consent obtained before beginning visit.   Provider spent 11 minutes with the patient during the telehealth visit.   The following staff were present during this visit: Nelly Barajas   Past Medical History/ Overview of Patient Symptoms     74-year-old  gentleman who follows up nephrolithiasis    Voids without issue.  No prostate medication    6/20 creatinine 0.9, GFR greater than 60, calcium 9.7, serum uric acid 5.9    With a 24-hour urine done recently.  Showed moderately high urine oxalate.  Urine calcium and urine citrate was okay.  Urine volume was just a little low.    No gross hematuria.      Previous    has post 3 lithotripsies, has had multiple kidney stones.      2/3/2020 KUB a few grouped stones in the lower pole of the left renal shadow, together measuring about 9 mm.  No other stones seen.    510/18 b/l ureteroscopy - normal bladder with some minor trabeculations.  Both sides were free of stone at the end of the procedure.    5/18 calcium oxalate dihydrate 30%, calcium oxalate monohydrate 60%, calcium phosphate 10%    5/4/18 CT abdomen/pelvis without  8.5 mm proximal right ureteral calculus.  3 other stones in the right kidney.  A 9 mm, 3 mm and 4 mm.  Left side has a couple of stones that amount to about a 1.5 x 2 cm stone.  All nonobstructing    No cardiopulmonary  history.  Patient does not smoke.  History of PE on Coumadin         Past Medical History  Arthritis; Blood Clotting Disorder; Hypertension; Nephrolithiasis; Pulmonary embolism         Past Surgical History  Artificial Joints/Limbs; Back; Back Surgery, Lumbar; Colonoscopy; Femur Fracture; Hernia; Hip fracture repair, left; Knee surgery; shoulder repair         Medication List  Eliquis 5 mg oral tablet; folic acid 1 mg oral tablet; metoprolol tartrate 50 mg oral tablet; multivitamin oral; prednisone 2.5 mg oral tablet         Allergy List  Codiene         Family Medical History  Heart Disease; *No Known Family History; Family history of Arthritis         Social History  Alcohol Use (Never); lives with spouse; .; Recreational Drug Use (Never); Retired.; Tobacco (Former)         Review of Systems  · Constitutional  o Denies  o : chills  · Respiratory  o Denies  o : cough  · Gastrointestinal  o Denies  o : nausea              Assessment  · Nephrolithiasis     592.0/N20.0    Problems Reconciled  Plan  · Orders  o Physician Telephone Evaluation, 11-20 minutes (54271) - 592.0/N20.0 - 06/16/2020  o KUB xray [facility] ([id number]) - 592.0/N20.0 - 02/01/2021  · Medications  o Medications have been Reconciled  o Transition of Care or Provider Policy  · Instructions  o Plan Of Care:   o Electronically Identified Patient Education Materials Provided Electronically       Reviewed 24-hour urine labs with patient    Discussed low oxalate diet, we will mail him out a low oxalate diet handout.  No need for medication at this time and we discussed this today.  No need for further 24-hour urine.      We did discuss with low oxalate diet and increasing his fluid intake.    Patient does have some nonobstructing stones we are following, he will follow-up with a KUB in 2/21             Electronically Signed by: Marcus Coelol MD -Author on June 16, 2020 10:46:07 AM

## 2021-05-13 NOTE — PROGRESS NOTES
"   Progress Note      Patient Name: Walker Ordoñez   Patient ID: [id number]   Sex: Male   YOB: 1945    Primary Care Provider: Odessa Jones MD   Referring Provider: Odessa Jones MD    Visit Date: November 3, 2020    Provider: Bimal Rivera MD   Location: [facility]   Location Address: [address] [place] [address], [address]   [place], [place]  [id number]   Location Phone: [phone]          Chief Complaint  · Hypertension   · Chest pain       History Of Present Illness  Walker Ordoñez is a 74 year old /White male with a history of chest pain, substernal, aching, non-exertional. The chest pain is better. He bicycles several miles with chest pain. Recent stress test was negative.   CURRENT MEDICATIONS: include Sertraline 25 mg daily; Lisinopril 2.5 mg daily; Prednisone 5 mg daily; Metoprolol ER 50 mg daily; Eliquis 5 mg b.i.d.; multivitamin. The dosage and frequency of the medications were reviewed with the patient.   PAST MEDICAL HISTORY: Arthritis; blood clotting disorder; hypertension; nephrolithiasis; pulmonary embolism.   PSYCHOSOCIAL HISTORY: He never used alcohol. He previously used tobacco but quit.       Review of Systems  · Cardiovascular  o Denies  o : palpitations (fast, fluttering, or skipping beats), swelling (feet, ankles, hands), shortness of breath while walking or lying flat, chest pain or angina pectoris   · Respiratory  o Denies  o : chronic or frequent cough, asthma or wheezing      Vitals  Date Time BP Position Site L\R Cuff Size HR RR TEMP (F) WT  HT  BMI kg/m2 BSA m2 O2 Sat FR L/min FiO2 HC       11/03/2020 11:21 /76 Sitting    58 - R   188lbs 0oz 6'  4\" 22.88 2.14       11/03/2020 11:21 /72 Sitting    56 - R                   Physical Examination  · Constitutional  o Appearance  o : Awake, alert, cooperative, pleasant.  · Respiratory  o Inspection of Chest  o : No chest wall deformities, moving equal.  o Auscultation of Lungs  o : Good air entry " with vesicular breath sounds.  · Cardiovascular  o Heart  o :   § Auscultation of Heart  § : S1 and S2 regular. No S3. No S4. No murmurs.  o Peripheral Vascular System  o :   § Extremities  § : Peripheral pulses were well felt. No edema. No cyanosis.  · Gastrointestinal  o Abdominal Examination  o : No masses or organomegaly noted.          Assessment     ASSESSMENT AND PLAN:    1.  Essential hypertension controlled:  Continue current dose of Lisinopril and Metoprolol.  2.  History of pulmonary embolism:  Continue current dose of Eliquis, managed by his PMD.  3.  Atypical chest pain:  Recent sestamibi stress test was negative for ischemia.  I discussed with the patient        the results of his stress test.  4.  See me back in 6 months.    Bimal Rivera MD, [facility]  OPAL/laura           This note was transcribed by Caity Winters.  laura/OPAL  The above service was transcribed by Caity Winters, and I attest to the accuracy of the note.  OPAL               Electronically Signed by: Caity Winters-, -Author on November 5, 2020 06:36:20 AM  Electronically Co-signed by: Bimal Rivera MD -Reviewer on November 8, 2020 09:44:37 AM

## 2021-05-14 VITALS — HEIGHT: 76 IN | WEIGHT: 193.12 LBS | RESPIRATION RATE: 16 BRPM | BODY MASS INDEX: 23.52 KG/M2

## 2021-05-14 VITALS
WEIGHT: 193 LBS | BODY MASS INDEX: 23.5 KG/M2 | HEIGHT: 76 IN | HEART RATE: 64 BPM | DIASTOLIC BLOOD PRESSURE: 64 MMHG | SYSTOLIC BLOOD PRESSURE: 164 MMHG

## 2021-05-14 VITALS
TEMPERATURE: 97.7 F | WEIGHT: 188 LBS | HEIGHT: 76 IN | BODY MASS INDEX: 22.89 KG/M2 | SYSTOLIC BLOOD PRESSURE: 168 MMHG | HEART RATE: 62 BPM | OXYGEN SATURATION: 97 % | DIASTOLIC BLOOD PRESSURE: 73 MMHG

## 2021-05-14 VITALS
DIASTOLIC BLOOD PRESSURE: 88 MMHG | WEIGHT: 189 LBS | SYSTOLIC BLOOD PRESSURE: 174 MMHG | HEART RATE: 62 BPM | BODY MASS INDEX: 23.02 KG/M2 | HEIGHT: 76 IN

## 2021-05-14 VITALS
OXYGEN SATURATION: 100 % | SYSTOLIC BLOOD PRESSURE: 174 MMHG | HEART RATE: 60 BPM | WEIGHT: 192 LBS | BODY MASS INDEX: 23.38 KG/M2 | DIASTOLIC BLOOD PRESSURE: 82 MMHG | HEIGHT: 76 IN | TEMPERATURE: 98.3 F

## 2021-05-14 VITALS
TEMPERATURE: 97.9 F | HEART RATE: 69 BPM | BODY MASS INDEX: 23.27 KG/M2 | WEIGHT: 191.12 LBS | HEIGHT: 76 IN | SYSTOLIC BLOOD PRESSURE: 163 MMHG | DIASTOLIC BLOOD PRESSURE: 120 MMHG | OXYGEN SATURATION: 100 %

## 2021-05-14 VITALS — DIASTOLIC BLOOD PRESSURE: 80 MMHG | SYSTOLIC BLOOD PRESSURE: 168 MMHG

## 2021-05-14 VITALS
HEIGHT: 76 IN | SYSTOLIC BLOOD PRESSURE: 166 MMHG | BODY MASS INDEX: 22.89 KG/M2 | HEART RATE: 58 BPM | WEIGHT: 188 LBS | DIASTOLIC BLOOD PRESSURE: 76 MMHG

## 2021-05-14 NOTE — PROGRESS NOTES
Progress Note      Patient Name: Walker Ordoñez   Patient ID: [id number]   Sex: Male   YOB: 1945    Primary Care Provider: Tanvir Garcia DO   Referring Provider: Odessa Jones MD    Visit Date: May 3, 2021    Provider: Tanvir Garcia DO   Location: [facility]   Location Address: [address] [place] [address]  [id number]   Location Phone: [phone]          Chief Complaint  · 2 month follow up - HTN  · dicuss medicaiton       History Of Present Illness  Walker Ordoñez is a 75 year old /White male who presents for evaluation and treatment of: HTN. He states that he has been checking his BP at home and it has been 130's/60-70. He is taking his BP meds daily.       Past Medical History  Disease Name Date Onset Notes   Anxiety --  --    Arthritis --  --    Blood Clotting Disorder --  04/27/2017 - 6 yrs ago pt had blood clot TJ   Broken Bones --  --    Depression --  --    Hemorrhoid --  --    Hypertension --  --    Kidney stone --  --    Nephrolithiasis --  --    Polymyalgia rheumatica --  --    Pulmonary embolism --  --          Past Surgical History  Procedure Name Date Notes   Artificial Joints/Limbs --  --    Back --  --    Back Surgery, Lumbar --  --    Colonoscopy 2019 --    Femur Fracture 2019 left   Hernia --  --    Hip fracture repair, left 2019 --    Knee surgery 2005 --    shoulder repair 2000 left tear          Medication List  Name Date Started Instructions   Eliquis 5 mg oral tablet  take 1 tablet (5 mg) by oral route 2 times per day   esomeprazole magnesium 20 mg oral capsule,delayed release(DR/EC)  take 1 capsule (20 mg) by oral route once daily at least 1 hour before a meal swallowing whole. Do not crush or chew granules.   lisinopril 20 mg oral tablet 03/04/2021 take 1 tablet (20 mg) by oral route once daily for 90 days   metoprolol succinate 50 mg oral tablet extended release 24 hr  take 1 tablet (50 mg) by oral route once  "daily   multivitamin oral  --    prednisone 1 mg oral tablet  take 1 tablet (1 mg) by oral route 4 times per day   sertraline 25 mg oral tablet  take 1 tablet (25 mg) by oral route once daily         Allergy List  Allergen Name Date Reaction Notes   Codiene --  --  --        Allergies Reconciled  Family Medical History  Disease Name Relative/Age Notes   *No Known Family History  --    Family history of Arthritis Mother/   Mother   Family history of cancer Mother/   Leukemia   Family history of heart disease Brother/   --          Social History  Finding Status Start/Stop Quantity Notes   Alcohol Never --/-- --  01/04/2021 -    lives with spouse --  --/-- --  --    . --  --/-- --  --    Recreational Drug Use Never --/-- --  no   Retired. --  --/-- --  --    Tobacco Former --/1972 1 ppd former smoker         Immunizations  NameDate Admin Mfg Trade Name Lot Number Route Inj VIS Given VIS Publication   COVID [id number]/04/2021 MOD Moderna COVID-19 Vaccine  NE NE 03/04/2021    Comments: [facility]   COVID [id number]/03/2021 MOD Moderna COVID-19 Vaccine  NE NE 02/03/2021    Comments: [facility]   Diwiferle77/01/2020 [facility] Fluarix, quadrivalent, preservative free [id number] NE NE 10/01/2020    Comments: antionette         Review of Systems  · Constitutional  o Denies  o : fatigue  · HENT  o Admits  o : headaches  o Denies  o : nasal congestion, nasal discharge, postnasal drip  · Cardiovascular  o Denies  o : chest pain, irregular heart beats, rapid heart rate  · Respiratory  o Denies  o : shortness of breath, wheezing, cough, dyspnea on exertion  · Gastrointestinal  o Denies  o : heartburn, reflux      Vitals  Date Time BP Position Site L\R Cuff Size HR RR TEMP (F) WT  HT  BMI kg/m2 BSA m2 O2 Sat FR L/min FiO2 HC       03/04/2021 11:49 /82 Sitting    60 - R  98.3 192lbs 0oz 6'  4\" 23.37 2.16 100 %  21%    04/02/2021 08:55 /64 Sitting    64 - R   193lbs 0oz 6'  4\" 23.49 2.17       05/03/2021 11:25 /73 Sitting    62 - R  " "97.7 188lbs 0oz 6'  4\" 22.88 2.14 97 %  21%    05/03/2021 11:58 /76 Sitting                       Physical Examination  · Constitutional  o Appearance  o : well-nourished, well developed, alert, in no acute distress, well-tended appearance  · Head and Face  o Head  o :   § Inspection  § : atraumatic, normocephalic  o Face  o :   § Inspection  § : no facial lesions  o HEENT  o : Unremarkable  · Eyes  o Conjunctivae  o : conjunctivae normal  o Sclerae  o : sclerae white  o Pupils and Irises  o : pupils equal and round, pupils reactive to light bilaterally  o Eyelids/Ocular Adnexae  o : eyelid appearance normal  · Ears, Nose, Mouth and Throat  o Ears  o :   § External Ears  § : appearance within normal limits, no lesions present  § Otoscopic Examination  § : tympanic membrane appearance within normal limits bilaterally without perforations, mobility normal  o Nose  o :   § External Nose  § : appearance normal  o Oral Cavity  o :   § Oral Mucosa  § : oral mucosa normal  § Lips  § : lip appearance normal  § Teeth  § : normal dentition for age  § Gums  § : gums pink, non-swollen, no bleeding present  § Tongue  § : tongue appearance normal  § Palate  § : hard palate normal, soft palate appearance normal  o Throat  o :   § Oropharynx  § : no inflammation or lesions present, tonsils within normal limits  · Neck  o Inspection/Palpation  o : normal appearance, no masses or tenderness, trachea midline  o Thyroid  o : gland size normal, nontender, no nodules or masses present on palpation  · Respiratory  o Respiratory Effort  o : breathing unlabored  o Auscultation of Lungs  o : normal breath sounds  · Cardiovascular  o Heart  o :   § Auscultation of Heart  § : regular rate, normal rhythm, no murmurs present  o Peripheral Vascular System  o :   § Extremities  § : no edema  · Lymphatic  o Neck  o : no lymphadenopathy           Assessment  · Hypertension     401.9/I10  Will re-check his BP. He had labs in the ER 5 weeks ago. " Will update his lipids.  · Pulmonary embolism     415.19/I26.99  will continue his eliquis      Plan  · Orders  o Lipid Panel [facility] ([id number]) - 401.9/I10 - 05/03/2021  o ACO-39: Current medications updated and reviewed (, 1159F) - - 05/03/2021  · Medications  o Medications have been Reconciled  o Transition of Care or Provider Policy  · Instructions  o Patient is taking medications as prescribed and doing well.   o Take all medications as prescribed/directed.  o Patient instructed/educated on their diet and exercise program.  o Patient was educated/instructed on their diagnosis, treatment and medications prior to discharge from the clinic today.  o Patient instructed to seek medical attention urgently for new or worsening symptoms.  o Call the office with any concerns or questions.  o Bring all medicines with their bottles to each office visit.  · Disposition  o Call or Return if symptoms worsen or persist.  o Return Visit Request in/on 6 months +/- 2 days ([id number]).            Electronically Signed by: Tanvir Garcia DO -Author on May 3, 2021 12:01:43 PM

## 2021-05-14 NOTE — PROGRESS NOTES
"   Progress Note      Patient Name: Walker Ordoñez   Patient ID: [id number]   Sex: Male   YOB: 1945    Primary Care Provider: Tanvir Garcia DO   Referring Provider: Odessa Jones MD    Visit Date: April 2, 2021    Provider: Bimal Rivera MD   Location: [facility]   Location Address: [address] [place] [address], [address]   [place], [place]  [id number]   Location Phone: [phone]          Chief Complaint     Hypertension.  Chest pain.       History Of Present Illness  Walker Ordoñez is a 75 year old /White male with a history of chest pain and recent negative stress test. He was recently in the hospital with acid reflux disease, treated and discharged. No further chest pain. Shortness of breath is stable.   CURRENT MEDICATIONS: Eliquis 5 mg b.i.d.; lisinopril 20 mg daily; metoprolol succinate ER 50 mg daily; esomeprazole 20 mg daily; sertraline 25 mg daily; prednisone 4 mg daily.   PAST MEDICAL HISTORY: Arthritis; Blood Clotting Disorder; Hypertension; Nephrolithiasis; Pulmonary embolism.   PSYCHOSOCIAL HISTORY: Previous tobacco use, but quit. Denies alcohol use.      ALLERGIES: Codeine.       Review of Systems  · Cardiovascular  o Admits  o : chest pain or angina pectoris   o Denies  o : palpitations (fast, fluttering, or skipping beats), swelling (feet, ankles, hands), shortness of breath while walking or lying flat  · Respiratory  o Denies  o : chronic or frequent cough      Vitals  Date Time BP Position Site L\R Cuff Size HR RR TEMP (F) WT  HT  BMI kg/m2 BSA m2 O2 Sat FR L/min FiO2 HC       04/02/2021 08:55 /64 Sitting    64 - R   193lbs 0oz 6'  4\" 23.49 2.17             Physical Examination  · Constitutional  o Appearance  o : Awake, alert, cooperative, pleasant.  · Respiratory  o Inspection of Chest  o : No chest wall deformities, moving equal.  o Auscultation of Lungs  o : Good air entry with vesicular breath sounds.  · Cardiovascular  o Heart  o :   § Auscultation of " Heart  § : S1 and S2 regular. No S3. No S4.   o Peripheral Vascular System  o :   § Extremities  § : Peripheral pulses were well felt. No edema. No cyanosis.  · Gastrointestinal  o Abdominal Examination  o : No masses or organomegaly noted.          Assessment     ASSESSMENT & PLAN:     1.  Atypical chest pain, probably noncardiac.  Recent negative stress test.  2.  Essential hypertension, controlled.  Continue lisinopril and metoprolol.  3.  Pulmonary embolism.  Continue current dose of Eliquis.  4.  See me back in 6 months.             Electronically Signed by: Jasmin Ram-, Other -Author on April 13, 2021 11:30:23 AM  Electronically Co-signed by: Bimal Rivera MD -Reviewer on April 14, 2021 08:47:51 AM

## 2021-05-14 NOTE — PROGRESS NOTES
Progress Note      Patient Name: Walker Ordoñez   Patient ID: [id number]   Sex: Male   YOB: 1945    Primary Care Provider: Tanvir Garcia DO   Referring Provider: Odessa Jones MD    Visit Date: February 26, 2021    Provider: Marcus Coello MD   Location: [facility]   Location Address: [address] [place] [address]  [id number]   Location Phone: [phone]          Chief Complaint  · urological issues      History Of Present Illness     75-year-old  gentleman who follows up nephrolithiasis    Voiding ok.  No prostate medication    Has not had any acute stone episode since his last visit.  no gross hematuria,   No history of kidney or bladder stone.    No cardiopulmonary history.  Patient does not smoke.  On Eliquis for history of DVT/PE    s/ post 3 lithotripsies, has had multiple kidney stones.      6/20 creatinine 0.9, GFR greater than 60, calcium 9.7, serum uric acid 5.9    F/u today with a KUB    2/23/2021 KUBmultiple calculi projecting over the upper to mid left kidney.  Largest being 4 to 5 mm.  Tiny calcification calcification projecting over the lower right kidney.    Previous    With a 24-hour urine done recently.  Showed moderately high urine oxalate.  Urine calcium and urine citrate was okay.  Urine volume was just a little low.    2/3/2020 KUB a few grouped stones in the lower pole of the left renal shadow, together measuring about 9 mm.  No other stones seen.    510/18 b/l ureteroscopy - normal bladder with some minor trabeculations.  Both sides were free of stone at the end of the procedure.    5/18 calcium oxalate dihydrate 30%, calcium oxalate monohydrate 60%, calcium phosphate 10%    5/4/18 CT abdomen/pelvis without  8.5 mm proximal right ureteral calculus.  3 other stones in the right kidney.  A 9 mm, 3 mm and 4 mm.  Left side has a couple of stones that amount to about a 1.5 x 2 cm stone.  All nonobstructing         Past Medical  "History  Anxiety; Arthritis; Blood Clotting Disorder; Broken Bones; Depression; Hemorrhoid; Hypertension; Kidney stone; Nephrolithiasis; Polymyalgia rheumatica; Pulmonary embolism         Past Surgical History  Artificial Joints/Limbs; Back; Back Surgery, Lumbar; Colonoscopy; Femur Fracture; Hernia; Hip fracture repair, left; Knee surgery; shoulder repair         Medication List  Eliquis 5 mg oral tablet; lisinopril 10 mg oral tablet; metoprolol tartrate 50 mg oral tablet; multivitamin oral; prednisone 1 mg oral tablet; sertraline 25 mg oral tablet         Allergy List  Codiene       Allergies Reconciled  Family Medical History  *No Known Family History; Family history of Arthritis; Family history of cancer; Family history of heart disease         Social History  Alcohol (Never); lives with spouse; .; Recreational Drug Use (Never); Retired.; Tobacco (Former)         Immunizations  Name Date Admin   Influenza 10/01/2020         Review of Systems  · Constitutional  o Denies  o : fatigue  · HENT  o Denies  o : headaches  · Cardiovascular  o Denies  o : chest pain  · Respiratory  o Denies  o : shortness of breath  · Gastrointestinal  o Denies  o : nausea, vomiting, diarrhea      Vitals  Date Time BP Position Site L\R Cuff Size HR RR TEMP (F) WT  HT  BMI kg/m2 BSA m2 O2 Sat FR L/min FiO2 HC       02/26/2021 10:23 AM       16  193lbs 2oz 6'  4\" 23.51 2.17                 Assessment  · Nephrolithiasis     592.0/N20.0      Plan  · Medications  o Medications have been Reconciled  o Transition of Care or Provider Policy  · Instructions  o Electronically Identified Patient Education Materials Provided Electronically       No need for further 24-hour urines  patient following low oxalate diet    We will have him follow-up with nurse practitioner in 2 years with a KUB to make sure no stones of any large size or major increase in stone burden.             Electronically Signed by: Marcus Coello MD -Author on February " 26, 2021 11:43:24 AM

## 2021-05-14 NOTE — PROGRESS NOTES
Progress Note      Patient Name: Walker Ordoñez   Patient ID: [id number]   Sex: Male   YOB: 1945    Primary Care Provider: Tanvir Garcia DO   Referring Provider: Odessa Jones MD    Visit Date: March 4, 2021    Provider: Tanvir Garcia DO   Location: [facility]   Location Address: [address] [place] [address]  [id number]   Location Phone: [phone]          Chief Complaint  · 2 month follow up - HTN, Blood clotting disorder, Polymyalgia rhuematica  · medication refills      History Of Present Illness  Walker Ordoñez is a 75 year old /White male who presents for evaluation and treatment of: HTN. He states that he has been checking his BP and it has been 140's/70's. He is taking his meds daily.       Past Medical History  Disease Name Date Onset Notes   Anxiety --  --    Arthritis --  --    Blood Clotting Disorder --  04/27/2017 - 6 yrs ago pt had blood clot TJ   Broken Bones --  --    Depression --  --    Hemorrhoid --  --    Hypertension --  --    Kidney stone --  --    Nephrolithiasis --  --    Polymyalgia rheumatica --  --    Pulmonary embolism --  --          Past Surgical History  Procedure Name Date Notes   Artificial Joints/Limbs --  --    Back --  --    Back Surgery, Lumbar --  --    Colonoscopy 2019 --    Femur Fracture 2019 left   Hernia --  --    Hip fracture repair, left 2019 --    Knee surgery 2005 --    shoulder repair 2000 left tear          Medication List  Name Date Started Instructions   Eliquis 5 mg oral tablet  take 1 tablet (5 mg) by oral route 2 times per day   lisinopril 10 mg oral tablet 01/04/2021 take 1 tablet (10 mg) by oral route once daily for 90 days   metoprolol tartrate 50 mg oral tablet  take 1 tablet by oral route daily   multivitamin oral  --    prednisone 1 mg oral tablet  take 1 tablet (1 mg) by oral route 4 times per day   sertraline 25 mg oral tablet  take 1 tablet (25 mg) by oral route once daily  "        Allergy List  Allergen Name Date Reaction Notes   Codiene --  --  --          Family Medical History  Disease Name Relative/Age Notes   *No Known Family History  --    Family history of Arthritis Mother/   Mother   Family history of cancer Mother/   Leukemia   Family history of heart disease Brother/   --          Social History  Finding Status Start/Stop Quantity Notes   Alcohol Never --/-- --  01/04/2021 -    lives with spouse --  --/-- --  --    . --  --/-- --  --    Recreational Drug Use Never --/-- --  no   Retired. --  --/-- --  --    Tobacco Former --/1972 1 ppd former smoker         Immunizations  NameDate Admin Mfg Trade Name Lot Number Route Inj VIS Given VIS Publication   COVID [id number]/04/2021 MOD Moderna COVID-19 Vaccine  NE NE 03/04/2021    Comments: [facility]   COVID Rklnimi9902/03/2021 MOD Moderna COVID-19 Vaccine  NE NE 02/03/2021    Comments: [facility]   Fxjlcpryf34/01/2020 [facility] Fluarix, quadrivalent, preservative free [id number] NE NE 10/01/2020    Comments: antionette         Review of Systems  · Constitutional  o Denies  o : fatigue  · HENT  o Denies  o : headaches  · Cardiovascular  o Denies  o : chest pain, irregular heart beats, rapid heart rate  · Respiratory  o Denies  o : shortness of breath, wheezing, cough, dyspnea on exertion      Vitals  Date Time BP Position Site L\R Cuff Size HR RR TEMP (F) WT  HT  BMI kg/m2 BSA m2 O2 Sat FR L/min FiO2 HC       01/04/2021 02:21 /120 Sitting    69 - R  97.9 191lbs 2oz 6'  4\" 23.26 2.16 100 %  21%    01/04/2021 03:06 /80 Sitting                 03/04/2021 11:49 /82 Sitting    60 - R  98.3 192lbs 0oz 6'  4\" 23.37 2.16 100 %  21%          Physical Examination  · Constitutional  o Appearance  o : well-nourished, well developed, no obvious deformities present  · Head and Face  o Head  o :   § Inspection  § : atraumatic, normocephalic  o Face  o :   § Inspection  § : no facial lesions  · Respiratory  o Respiratory Effort  o : breathing " unlabored, no accessory muscle use  o Auscultation of Lungs  o : normal breath sounds  · Cardiovascular  o Heart  o :   § Auscultation of Heart  § : regular rate, normal rhythm, no murmurs present          Assessment  · Hypertension     401.9/I10  His BP needs to be lower. Will adjust his meds      Plan  · Orders  o ACO-39: Current medications updated and reviewed (, 1159F) - - 03/04/2021  · Medications  o lisinopril 20 mg oral tablet   SIG: take 1 tablet (20 mg) by oral route once daily for 90 days   DISP: (90) Tablet with 0 refills  Adjusted on 03/04/2021     o Medications have been Reconciled  o Transition of Care or Provider Policy  · Instructions  o Patient is taking medications as prescribed and doing well.   o Take all medications as prescribed/directed.  o Patient instructed/educated on their diet and exercise program.  o Patient was educated/instructed on their diagnosis, treatment and medications prior to discharge from the clinic today.  o Patient instructed to seek medical attention urgently for new or worsening symptoms.  o Call the office with any concerns or questions.  o Bring all medicines with their bottles to each office visit.  · Disposition  o Call or Return if symptoms worsen or persist.  o Return Visit Request in/on 2 months +/- 2 days ([id number]).            Electronically Signed by: Tanvir Garcia DO -Author on March 4, 2021 12:40:25 PM

## 2021-05-15 VITALS — WEIGHT: 185 LBS | HEIGHT: 77 IN | BODY MASS INDEX: 21.84 KG/M2 | HEART RATE: 72 BPM | OXYGEN SATURATION: 95 %

## 2021-05-15 VITALS — BODY MASS INDEX: 24.64 KG/M2 | HEIGHT: 76 IN | OXYGEN SATURATION: 98 % | WEIGHT: 202.37 LBS | HEART RATE: 62 BPM

## 2021-05-15 VITALS — OXYGEN SATURATION: 98 % | BODY MASS INDEX: 21.84 KG/M2 | HEART RATE: 78 BPM | HEIGHT: 77 IN | WEIGHT: 185 LBS

## 2021-05-15 VITALS — WEIGHT: 201 LBS | BODY MASS INDEX: 24.48 KG/M2 | HEIGHT: 76 IN | RESPIRATION RATE: 17 BRPM

## 2021-05-15 VITALS — HEART RATE: 58 BPM | WEIGHT: 200 LBS | HEIGHT: 76 IN | OXYGEN SATURATION: 97 % | BODY MASS INDEX: 24.36 KG/M2

## 2021-05-15 VITALS — BODY MASS INDEX: 23.39 KG/M2 | HEART RATE: 74 BPM | HEIGHT: 76 IN | OXYGEN SATURATION: 97 % | WEIGHT: 192.12 LBS

## 2021-05-16 VITALS — WEIGHT: 189 LBS | BODY MASS INDEX: 22.32 KG/M2 | HEIGHT: 77 IN | HEART RATE: 58 BPM

## 2021-05-16 VITALS — BODY MASS INDEX: 21.86 KG/M2 | RESPIRATION RATE: 12 BRPM | HEIGHT: 77 IN | WEIGHT: 185.12 LBS

## 2021-05-16 VITALS — WEIGHT: 187.12 LBS | BODY MASS INDEX: 22.09 KG/M2 | HEIGHT: 77 IN | RESPIRATION RATE: 12 BRPM

## 2021-05-16 VITALS — HEIGHT: 77 IN | BODY MASS INDEX: 22.08 KG/M2 | RESPIRATION RATE: 16 BRPM | WEIGHT: 187 LBS

## 2021-06-14 RX ORDER — LISINOPRIL 20 MG/1
20 TABLET ORAL DAILY
Qty: 90 TABLET | Refills: 1 | Status: SHIPPED | OUTPATIENT
Start: 2021-06-14 | End: 2022-01-24

## 2021-06-14 RX ORDER — LISINOPRIL 2.5 MG/1
1 TABLET ORAL DAILY
COMMUNITY
End: 2021-06-14 | Stop reason: SDUPTHER

## 2021-07-02 ENCOUNTER — OFFICE VISIT (OUTPATIENT)
Dept: CARDIOLOGY | Facility: CLINIC | Age: 76
End: 2021-07-02

## 2021-07-02 VITALS
WEIGHT: 187 LBS | HEART RATE: 65 BPM | SYSTOLIC BLOOD PRESSURE: 143 MMHG | DIASTOLIC BLOOD PRESSURE: 70 MMHG | HEIGHT: 76 IN | BODY MASS INDEX: 22.77 KG/M2

## 2021-07-02 DIAGNOSIS — I10 HYPERTENSION, ESSENTIAL: Primary | ICD-10-CM

## 2021-07-02 PROCEDURE — 93000 ELECTROCARDIOGRAM COMPLETE: CPT | Performed by: SPECIALIST

## 2021-07-02 PROCEDURE — 99213 OFFICE O/P EST LOW 20 MIN: CPT | Performed by: SPECIALIST

## 2021-07-02 RX ORDER — SERTRALINE HYDROCHLORIDE 25 MG/1
TABLET, FILM COATED ORAL
COMMUNITY
Start: 2021-05-30 | End: 2021-12-02 | Stop reason: SDUPTHER

## 2021-07-02 NOTE — PROGRESS NOTES
[facility]  Cardiology progress Note    Patient Name: Walker Ordoñez  : 1945    CC: Hypertension, atypical chest pain    Subjective   Subjective       HPI:  Walker Ordoñez is a 75 y.o. male with history of atypical chest pain and negative stress test.  He has no further chest pain or shortness of breath.    Review of Systems:   Constitutional no fever,  no weight loss   Skin no rash   Otolaryngeal no difficulty swallowing   Cardiovascular See HPI   Pulmonary no cough, no sputum production   Gastrointestinal no constipation, no diarrhea   Genitourinary no dysuria, no hematuria   Hematologic no easy bruisability, no abnormal bleeding   Musculoskeletal no muscle pain   Neurologic no dizziness, no falls         Personal History     Social History:  reports that he quit smoking about 49 years ago. He has never used smokeless tobacco. He reports that he does not drink alcohol and does not use drugs.    Home Medications:  Current Outpatient Medications on File Prior to Visit   Medication Sig   • lisinopril (PRINIVIL,ZESTRIL) 20 MG tablet Take 1 tablet by mouth Daily for 90 days.   • metoprolol tartrate (LOPRESSOR) 50 MG tablet Take 50 mg by mouth 2 (Two) Times a Day.   • Multiple Vitamin (MULTI-VITAMIN DAILY PO) Take 1 tablet by mouth Daily.   • predniSONE (DELTASONE) 1 MG tablet Take 3 mg by mouth Daily.   • sertraline (ZOLOFT) 25 MG tablet    • warfarin (COUMADIN) 2.5 MG tablet Take 2.5 mg by mouth Daily. Wednesday  & Friday   • warfarin (COUMADIN) 5 MG tablet Take 5 mg by mouth Daily.    • folic acid (FOLVITE) 1 MG tablet Take 1 mg by mouth Daily.   • methotrexate 2.5 MG tablet Take 15 mg by mouth 1 (One) Time Per Week.     No current facility-administered medications on file prior to visit.     Allergies:  Allergies   Allergen Reactions   • Codeine GI Intolerance and Nausea Only     Sick at stomach       Objective    Objective       Vitals:   Heart Rate:  [65]  65  BP: (143)/(70) 143/70  Body mass index is 22.76 kg/m².     Physical Exam:   Constitutional: Awake, alert, No acute distress    Eyes: PERRLA, sclerae anicteric, no conjunctival injection   HENT: NCAT, mucous membranes moist   Neck: Supple, no thyromegaly, no lymphadenopathy, trachea midline   Respiratory: Clear to auscultation bilaterally, nonlabored respirations    Cardiovascular: RRR, SM 2/6, rubs, or gallops, palpable pedal pulses bilaterally   Gastrointestinal: Positive bowel sounds, soft, nontender, nondistended   Musculoskeletal: No bilateral ankle edema, no clubbing or cyanosis to extremities   Psychiatric: Appropriate affect, cooperative   Neurologic: Oriented x 3, strength symmetric in all extremities, Cranial Nerves grossly intact to confrontation, speech clear   Skin: No rashes.    Result Review    Result Review:  I have personally reviewed the available results from  [x]  Laboratory  [x]  EKG  [x]  Cardiology  [x]  Medications  [x]  Old records  []  Other:     ECG 12 Lead    Date/Time: 7/2/2021 1:48 PM  Performed by: Bimal Rivera MD  Authorized by: Bimal Rivera MD   Rhythm: sinus rhythm  Rate: normal  Other findings: non-specific ST-T wave changes    Clinical impression: abnormal EKG          No results found for: CHOL  Lab Results   Component Value Date    TRIG 271 (H) 05/03/2021     Lab Results   Component Value Date    HDL 31 (L) 05/03/2021     Lab Results   Component Value Date    LDL 77 05/03/2021     Lab Results   Component Value Date    VLDL 54 (H) 05/03/2021         Impression/Plan:  1.  Atypical chest pain, probably noncardiac.  Recent negative stress test.  2.  Essential hypertension, controlled.  Continue lisinopril and metoprolol.  3.  Pulmonary embolism.  Continue current dose of Eliquis.  4.  See me back in 6 months.             Electronically signed by Bimal Rivera MD, 07/02/21, 10:22 AM EDT.

## 2021-08-30 RX ORDER — PREDNISONE 1 MG/1
TABLET ORAL
Qty: 360 TABLET | Refills: 0 | Status: SHIPPED | OUTPATIENT
Start: 2021-08-30 | End: 2021-11-29

## 2021-09-10 RX ORDER — APIXABAN 5 MG/1
TABLET, FILM COATED ORAL
COMMUNITY
Start: 2021-08-08 | End: 2021-09-10 | Stop reason: SDUPTHER

## 2021-09-10 RX ORDER — APIXABAN 5 MG/1
5 TABLET, FILM COATED ORAL 2 TIMES DAILY
Qty: 60 TABLET | Refills: 1 | Status: SHIPPED | OUTPATIENT
Start: 2021-09-10 | End: 2021-11-08

## 2021-11-04 ENCOUNTER — TELEPHONE (OUTPATIENT)
Dept: FAMILY MEDICINE CLINIC | Facility: CLINIC | Age: 76
End: 2021-11-04

## 2021-11-04 ENCOUNTER — OFFICE VISIT (OUTPATIENT)
Dept: FAMILY MEDICINE CLINIC | Facility: CLINIC | Age: 76
End: 2021-11-04

## 2021-11-04 VITALS
DIASTOLIC BLOOD PRESSURE: 69 MMHG | WEIGHT: 188.4 LBS | HEIGHT: 76 IN | SYSTOLIC BLOOD PRESSURE: 145 MMHG | OXYGEN SATURATION: 100 % | BODY MASS INDEX: 22.94 KG/M2 | TEMPERATURE: 98.7 F | HEART RATE: 64 BPM

## 2021-11-04 DIAGNOSIS — Z79.52 LONG TERM CURRENT USE OF SYSTEMIC STEROIDS: ICD-10-CM

## 2021-11-04 DIAGNOSIS — M35.3 PMR (POLYMYALGIA RHEUMATICA) (HCC): ICD-10-CM

## 2021-11-04 DIAGNOSIS — F33.42 RECURRENT MAJOR DEPRESSIVE DISORDER, IN FULL REMISSION (HCC): ICD-10-CM

## 2021-11-04 DIAGNOSIS — I26.99 PULMONARY EMBOLISM, UNSPECIFIED CHRONICITY, UNSPECIFIED PULMONARY EMBOLISM TYPE, UNSPECIFIED WHETHER ACUTE COR PULMONALE PRESENT (HCC): ICD-10-CM

## 2021-11-04 DIAGNOSIS — I10 HYPERTENSION, ESSENTIAL: Primary | ICD-10-CM

## 2021-11-04 LAB
ALBUMIN SERPL-MCNC: 4.2 G/DL (ref 3.5–5.2)
ALBUMIN/GLOB SERPL: 1.6 G/DL
ALP SERPL-CCNC: 73 U/L (ref 39–117)
ALT SERPL W P-5'-P-CCNC: 16 U/L (ref 1–41)
ANION GAP SERPL CALCULATED.3IONS-SCNC: 8.6 MMOL/L (ref 5–15)
AST SERPL-CCNC: 22 U/L (ref 1–40)
BASOPHILS # BLD AUTO: 0.07 10*3/MM3 (ref 0–0.2)
BASOPHILS NFR BLD AUTO: 0.8 % (ref 0–1.5)
BILIRUB SERPL-MCNC: 0.4 MG/DL (ref 0–1.2)
BUN SERPL-MCNC: 10 MG/DL (ref 8–23)
BUN/CREAT SERPL: 12.3 (ref 7–25)
CALCIUM SPEC-SCNC: 9.4 MG/DL (ref 8.6–10.5)
CHLORIDE SERPL-SCNC: 104 MMOL/L (ref 98–107)
CHOLEST SERPL-MCNC: 159 MG/DL (ref 0–200)
CO2 SERPL-SCNC: 27.4 MMOL/L (ref 22–29)
CREAT SERPL-MCNC: 0.81 MG/DL (ref 0.76–1.27)
DEPRECATED RDW RBC AUTO: 47.2 FL (ref 37–54)
EOSINOPHIL # BLD AUTO: 0.32 10*3/MM3 (ref 0–0.4)
EOSINOPHIL NFR BLD AUTO: 3.4 % (ref 0.3–6.2)
ERYTHROCYTE [DISTWIDTH] IN BLOOD BY AUTOMATED COUNT: 12.9 % (ref 12.3–15.4)
ERYTHROCYTE [SEDIMENTATION RATE] IN BLOOD: 5 MM/HR (ref 0–20)
GFR SERPL CREATININE-BSD FRML MDRD: 92 ML/MIN/1.73
GLOBULIN UR ELPH-MCNC: 2.7 GM/DL
GLUCOSE SERPL-MCNC: 100 MG/DL (ref 65–99)
HCT VFR BLD AUTO: 45.2 % (ref 37.5–51)
HDLC SERPL-MCNC: 33 MG/DL (ref 40–60)
HGB BLD-MCNC: 15.1 G/DL (ref 13–17.7)
IMM GRANULOCYTES # BLD AUTO: 0.05 10*3/MM3 (ref 0–0.05)
IMM GRANULOCYTES NFR BLD AUTO: 0.5 % (ref 0–0.5)
LDLC SERPL CALC-MCNC: 87 MG/DL (ref 0–100)
LDLC/HDLC SERPL: 2.43 {RATIO}
LYMPHOCYTES # BLD AUTO: 1.26 10*3/MM3 (ref 0.7–3.1)
LYMPHOCYTES NFR BLD AUTO: 13.5 % (ref 19.6–45.3)
MCH RBC QN AUTO: 33.3 PG (ref 26.6–33)
MCHC RBC AUTO-ENTMCNC: 33.4 G/DL (ref 31.5–35.7)
MCV RBC AUTO: 99.6 FL (ref 79–97)
MONOCYTES # BLD AUTO: 0.7 10*3/MM3 (ref 0.1–0.9)
MONOCYTES NFR BLD AUTO: 7.5 % (ref 5–12)
NEUTROPHILS NFR BLD AUTO: 6.91 10*3/MM3 (ref 1.7–7)
NEUTROPHILS NFR BLD AUTO: 74.3 % (ref 42.7–76)
NRBC BLD AUTO-RTO: 0 /100 WBC (ref 0–0.2)
PLATELET # BLD AUTO: 319 10*3/MM3 (ref 140–450)
PMV BLD AUTO: 9.6 FL (ref 6–12)
POTASSIUM SERPL-SCNC: 4.5 MMOL/L (ref 3.5–5.2)
PROT SERPL-MCNC: 6.9 G/DL (ref 6–8.5)
RBC # BLD AUTO: 4.54 10*6/MM3 (ref 4.14–5.8)
SODIUM SERPL-SCNC: 140 MMOL/L (ref 136–145)
TRIGL SERPL-MCNC: 229 MG/DL (ref 0–150)
VLDLC SERPL-MCNC: 39 MG/DL (ref 5–40)
WBC # BLD AUTO: 9.31 10*3/MM3 (ref 3.4–10.8)

## 2021-11-04 PROCEDURE — 85025 COMPLETE CBC W/AUTO DIFF WBC: CPT | Performed by: FAMILY MEDICINE

## 2021-11-04 PROCEDURE — 80053 COMPREHEN METABOLIC PANEL: CPT | Performed by: FAMILY MEDICINE

## 2021-11-04 PROCEDURE — 99214 OFFICE O/P EST MOD 30 MIN: CPT | Performed by: FAMILY MEDICINE

## 2021-11-04 PROCEDURE — 85652 RBC SED RATE AUTOMATED: CPT | Performed by: FAMILY MEDICINE

## 2021-11-04 PROCEDURE — 80061 LIPID PANEL: CPT | Performed by: FAMILY MEDICINE

## 2021-11-04 PROCEDURE — 36415 COLL VENOUS BLD VENIPUNCTURE: CPT | Performed by: FAMILY MEDICINE

## 2021-11-04 NOTE — PROGRESS NOTES
Chief Complaint   Patient presents with   • Follow-up     6mo f/u        Subjective     Walker Ordoñez  has a past medical history of Anxiety, Arthritis, Blood clotting disorder (HCC) (04/27/2017), Broken bones, Depression, Hemorrhoid, Nephrolithiasis, PMR (polymyalgia rheumatica) ([facility]), and Pulmonary embolism ([facility]).    Hypertension-he checks his blood pressure about 3 days a week.  He states it is typically in the 130s over 60s on a regular basis.    Pulmonary embolism.  He continues on his Eliquis twice daily.  He denies any rectal bleeding bloody urine or nosebleeds.  He denies any unexplained large bruises.  He did have a basal cell carcinoma removed from his right ear and did have some excessive bleeding afterwards.  This did precipitate a trip to the urgent care to help control that bleeding though.    Polymyalgia rheumatica.  He is currently on  prednisone 4 mg daily.    Depression-overall he states he is doing well he has occasional bad days but nothing persistent.      PHQ-2 Depression Screening  Little interest or pleasure in doing things? 0   Feeling down, depressed, or hopeless? 0   PHQ-2 Total Score 0   PHQ-9 Depression Screening  Little interest or pleasure in doing things? 0   Feeling down, depressed, or hopeless? 0   Trouble falling or staying asleep, or sleeping too much?     Feeling tired or having little energy?     Poor appetite or overeating?     Feeling bad about yourself - or that you are a failure or have let yourself or your family down?     Trouble concentrating on things, such as reading the newspaper or watching television?     Moving or speaking so slowly that other people could have noticed? Or the opposite - being so fidgety or restless that you have been moving around a lot more than usual?     Thoughts that you would be better off dead, or of hurting yourself in some way?     PHQ-9 Total Score 0   If you checked off any problems, how difficult have these problems made it for you to do  your work, take care of things at home, or get along with other people?       Allergies   Allergen Reactions   • Codeine GI Intolerance and Nausea Only     Sick at stomach       Prior to Admission medications    Medication Sig Start Date End Date Taking? Authorizing Provider   Eliquis 5 MG tablet tablet Take 1 tablet by mouth 2 (two) times a day. 9/10/21  Yes Tanvir Garcia DO   folic acid (FOLVITE) 1 MG tablet Take 1 mg by mouth Daily.   Yes Eva Garza MD   metoprolol tartrate (LOPRESSOR) 50 MG tablet Take 50 mg by mouth 2 (Two) Times a Day.   Yes Eva Garza MD   Multiple Vitamin (MULTI-VITAMIN DAILY PO) Take 1 tablet by mouth Daily.   Yes Eva Garza MD   predniSONE (DELTASONE) 1 MG tablet TAKE ONE TABLET BY MOUTH FOUR TIMES A DAY 21  Yes Tanvir Garcia DO   sertraline (ZOLOFT) 25 MG tablet  21  Yes Eva Garza MD   lisinopril (PRINIVIL,ZESTRIL) 20 MG tablet Take 1 tablet by mouth Daily for 90 days. 21  Tanvir Garcia DO   methotrexate 2.5 MG tablet Take 15 mg by mouth 1 (One) Time Per Week.    ProviderEva MD        Patient Active Problem List   Diagnosis   • Long term current use of systemic steroids   • Hypertension, essential   • PMR (polymyalgia rheumatica) (HCC)   • Depression   • Pulmonary embolism (HCC)        Past Surgical History:   Procedure Laterality Date   • ARTHROPLASTY      Artificial Joints/Limbs   • BACK SURGERY      Lumbar   • COLONOSCOPY     • ESOPHAGUS SURGERY      stretching   • FEMUR FRACTURE SURGERY Left    • HERNIA REPAIR     • HIP FRACTURE SURGERY Left    • KNEE SURGERY Right    • NECK SURGERY     • SHOULDER SURGERY Left     Left Tear       Social History     Socioeconomic History   • Marital status:    Tobacco Use   • Smoking status: Former Smoker     Quit date: 1972     Years since quittin.8   • Smokeless tobacco: Never Used   • Tobacco comment: Quit 46  "years ago   Vaping Use   • Vaping Use: Never used   Substance and Sexual Activity   • Alcohol use: Never   • Drug use: Never   • Sexual activity: Defer       Family History   Problem Relation Age of Onset   • Arthritis Mother    • Leukemia Mother    • Heart disease Brother        Family history, surgical history, past medical history, Allergies and med's reviewed with patient today and updated in [facility] EMR.     ROS:  Review of Systems   Constitutional: Negative for fatigue.   HENT: Negative for congestion, nosebleeds, postnasal drip and rhinorrhea.    Eyes: Negative for blurred vision and visual disturbance.   Respiratory: Negative for cough, chest tightness, shortness of breath and wheezing.    Cardiovascular: Negative for chest pain and palpitations.   Gastrointestinal: Negative for abdominal pain, blood in stool, constipation, diarrhea and indigestion.   Genitourinary: Negative for hematuria.   Musculoskeletal: Negative for myalgias.   Neurological: Negative for weakness and headache.   Psychiatric/Behavioral: Negative for depressed mood. The patient is not nervous/anxious.        OBJECTIVE:  Vitals:    11/04/21 1134   BP: 145/69   BP Location: Left arm   Patient Position: Sitting   Cuff Size: Adult   Pulse: 64   Temp: 98.7 °F (37.1 °C)   TempSrc: Temporal   SpO2: 100%   Weight: 85.5 kg (188 lb 6.4 oz)   Height: 193 cm (76\")     No exam data present   Body mass index is 22.93 kg/m².  No LMP for male patient.    Physical Exam  Vitals and nursing note reviewed.   Constitutional:       General: He is not in acute distress.     Appearance: Normal appearance. He is normal weight.   HENT:      Head: Normocephalic.      Right Ear: Tympanic membrane, ear canal and external ear normal.      Left Ear: Tympanic membrane, ear canal and external ear normal.      Nose: Nose normal.      Mouth/Throat:      Mouth: Mucous membranes are moist.      Pharynx: Oropharynx is clear.   Eyes:      General: No scleral icterus.     " Conjunctiva/sclera: Conjunctivae normal.      Pupils: Pupils are equal, round, and reactive to light.   Cardiovascular:      Rate and Rhythm: Normal rate and regular rhythm.      Pulses: Normal pulses.      Heart sounds: Normal heart sounds. No murmur heard.      Pulmonary:      Effort: Pulmonary effort is normal.      Breath sounds: Normal breath sounds. No wheezing, rhonchi or rales.   Musculoskeletal:      Cervical back: No rigidity or tenderness.   Lymphadenopathy:      Cervical: No cervical adenopathy.   Skin:     General: Skin is warm and dry.      Coloration: Skin is not jaundiced.      Findings: No rash.   Neurological:      General: No focal deficit present.      Mental Status: He is alert and oriented to person, place, and time.      Gait: Gait normal.   Psychiatric:         Mood and Affect: Mood normal.         Thought Content: Thought content normal.         Judgment: Judgment normal.         Procedures    No visits with results within 30 Day(s) from this visit.   Latest known visit with results is:   Hospital Outpatient Visit on 05/07/2019   Component Date Value Ref Range Status   • Creatinine 05/07/2019 1.00  0.60 - 1.30 mg/dL Final    Serial Number: [id number]:  [id number]       ASSESSMENT/ PLAN:    Diagnoses and all orders for this visit:    1. Hypertension, essential (Primary)  Assessment & Plan:  His blood pressure is a little elevated here today.  It has been consistently very good at home.  Therefore I will not adjust his medication but we will continue to monitor.    Orders:  -     Comprehensive Metabolic Panel  -     Lipid Panel    2. PMR (polymyalgia rheumatica) ([facility])  Assessment & Plan:  Currently he is doing well on prednisone 4 mg daily we will see if he can go down to 3 mg daily up until our next visit.    Orders:  -     Sedimentation Rate  -     CBC Auto Differential  -     DEXA Bone Density Axial; Future    3. Recurrent major depressive disorder, in full remission ([facility])  Assessment &  Plan:  His depression is well controlled with his sertraline daily.      4. Pulmonary embolism, unspecified chronicity, unspecified pulmonary embolism type, unspecified whether acute cor pulmonale present (HCC)  Assessment & Plan:  He remains on his Eliquis twice daily.    Orders:  -     CBC Auto Differential    5. Long term current use of systemic steroids  Assessment & Plan:  As of his long-term steroid use we will update his DEXA scan.  The last one was about 2-1/2 years ago.    Orders:  -     DEXA Bone Density Axial; Future      Orders Placed Today:     No orders of the defined types were placed in this encounter.       Management Plan:     An After Visit Summary was printed and given to the patient at discharge.    Follow-up: Return in about 6 months (around 5/4/2022) for Recheck.    Tanvir Garcia DO 11/4/2021 12:29 EDT  This note was electronically signed.

## 2021-11-04 NOTE — ASSESSMENT & PLAN NOTE
As of his long-term steroid use we will update his DEXA scan.  The last one was about 2-1/2 years ago.

## 2021-11-04 NOTE — ASSESSMENT & PLAN NOTE
His blood pressure is a little elevated here today.  It has been consistently very good at home.  Therefore I will not adjust his medication but we will continue to monitor.

## 2021-11-04 NOTE — ASSESSMENT & PLAN NOTE
Currently he is doing well on prednisone 4 mg daily we will see if he can go down to 3 mg daily up until our next visit.

## 2021-11-08 RX ORDER — APIXABAN 5 MG/1
TABLET, FILM COATED ORAL
Qty: 60 TABLET | Refills: 12 | Status: SHIPPED | OUTPATIENT
Start: 2021-11-08 | End: 2022-12-05

## 2021-11-13 ENCOUNTER — HOSPITAL ENCOUNTER (OUTPATIENT)
Dept: BONE DENSITY | Facility: HOSPITAL | Age: 76
Discharge: HOME | End: 2021-11-13
Admitting: FAMILY MEDICINE

## 2021-11-13 ENCOUNTER — APPOINTMENT (OUTPATIENT)
Dept: BONE DENSITY | Facility: HOSPITAL | Age: 76
End: 2021-11-13

## 2021-11-13 DIAGNOSIS — Z79.52 LONG TERM CURRENT USE OF SYSTEMIC STEROIDS: ICD-10-CM

## 2021-11-13 DIAGNOSIS — M35.3 PMR (POLYMYALGIA RHEUMATICA) (HCC): ICD-10-CM

## 2021-11-13 PROCEDURE — 77080 DXA BONE DENSITY AXIAL: CPT

## 2021-11-29 RX ORDER — PREDNISONE 1 MG/1
TABLET ORAL
Qty: 360 TABLET | Refills: 0 | Status: SHIPPED | OUTPATIENT
Start: 2021-11-29 | End: 2022-03-01

## 2021-12-02 RX ORDER — SERTRALINE HYDROCHLORIDE 25 MG/1
25 TABLET, FILM COATED ORAL DAILY
Qty: 90 TABLET | Refills: 1 | Status: SHIPPED | OUTPATIENT
Start: 2021-12-02 | End: 2022-05-31

## 2022-01-24 RX ORDER — LISINOPRIL 20 MG/1
TABLET ORAL
Qty: 90 TABLET | Refills: 1 | Status: SHIPPED | OUTPATIENT
Start: 2022-01-24 | End: 2022-07-25

## 2022-02-02 NOTE — PROGRESS NOTES
[facility]  Cardiology progress Note    Patient Name: Walker Ordoñez  : 1945    CHIEF COMPLAINT  Hypertension.      Subjective   Subjective     HISTORY OF PRESENT ILLNESS    Walker Ordoñez is a 76 y.o. male with history of hypertension and pulmonary embolism.  No chest pain or shortness of breath.    Review of Systems:   Constitutional no fever,  no weight loss   Skin no rash   Otolaryngeal no difficulty swallowing   Cardiovascular See HPI   Pulmonary no cough, no sputum production   Gastrointestinal no constipation, no diarrhea   Genitourinary no dysuria, no hematuria   Hematologic no easy bruisability, no abnormal bleeding   Musculoskeletal no muscle pain   Neurologic no dizziness, no falls         Personal History     Social History:  reports that he quit smoking about 50 years ago. His smoking use included cigarettes. He started smoking about 57 years ago. He smoked 1.00 pack per day. He has never used smokeless tobacco. He reports that he does not drink alcohol and does not use drugs.    Home Medications:  Current Outpatient Medications on File Prior to Visit   Medication Sig   • Eliquis 5 MG tablet tablet TAKE ONE TABLET BY MOUTH TWICE A DAY   • lisinopril (PRINIVIL,ZESTRIL) 20 MG tablet TAKE ONE TABLET BY MOUTH DAILY   • metoprolol succinate XL (TOPROL-XL) 50 MG 24 hr tablet Take 50 mg by mouth Daily.   • Multiple Vitamin (MULTI-VITAMIN DAILY PO) Take 1 tablet by mouth Daily.   • predniSONE (DELTASONE) 1 MG tablet TAKE ONE TABLET BY MOUTH FOUR TIMES A DAY   • sertraline (ZOLOFT) 25 MG tablet Take 1 tablet by mouth Daily.   • [DISCONTINUED] folic acid (FOLVITE) 1 MG tablet Take 1 mg by mouth Daily.   • [DISCONTINUED] metoprolol tartrate (LOPRESSOR) 50 MG tablet Take 50 mg by mouth 2 (Two) Times a Day.     No current facility-administered medications on file prior to visit.     Allergies:  Allergies   Allergen Reactions   • Codeine GI Intolerance and Nausea Only     Sick at stomach        Objective    Objective       Vitals:   Heart Rate:  [63] 63  BP: (137-153)/(66-72) 137/66  Body mass index is 23.25 kg/m².     Physical Exam:   Constitutional: Awake, alert, No acute distress    Eyes: PERRLA, sclerae anicteric, no conjunctival injection   HENT: NCAT, mucous membranes moist   Neck: Supple, no thyromegaly, no lymphadenopathy, trachea midline   Respiratory: Clear to auscultation bilaterally, nonlabored respirations    Cardiovascular: RRR, no murmurs or rubs. Palpable pedal pulses bilaterally   Musculoskeletal: No bilateral ankle edema, no cyanosis to extremities   Psychiatric: Appropriate affect, cooperative   Neurologic: Oriented x 3, strength symmetric in all extremities, Cranial Nerves grossly intact to confrontation, speech clear   Skin: No rashes.    Result Review    Result Review:  I have personally reviewed the available results from  [x]  Laboratory  [x]  EKG  [x]  Cardiology  [x]  Medications  [x]  Old records  []  Other:   Procedures  Lab Results   Component Value Date    CHOL 159 11/04/2021     Lab Results   Component Value Date    TRIG 229 (H) 11/04/2021    TRIG 271 (H) 05/03/2021     Lab Results   Component Value Date    HDL 33 (L) 11/04/2021    HDL 31 (L) 05/03/2021     Lab Results   Component Value Date    LDL 87 11/04/2021    LDL 77 05/03/2021     Lab Results   Component Value Date    VLDL 39 11/04/2021    VLDL 54 (H) 05/03/2021        Impression/Plan:  1.  Essential hypertension controlled: Continue lisinopril 20 mg once a day.  Continue Toprol-XL 50 mg once a day.  2.  History of pulmonary embolism: Continue Eliquis 5 mg twice daily.  3.  Palpitations stable: Continue Toprol-XL 50 mg a day.           Bimal Rivera MD   02/03/22   10:01 EST

## 2022-02-03 ENCOUNTER — OFFICE VISIT (OUTPATIENT)
Dept: CARDIOLOGY | Facility: CLINIC | Age: 77
End: 2022-02-03

## 2022-02-03 VITALS
DIASTOLIC BLOOD PRESSURE: 66 MMHG | HEIGHT: 76 IN | WEIGHT: 191 LBS | SYSTOLIC BLOOD PRESSURE: 137 MMHG | HEART RATE: 63 BPM | BODY MASS INDEX: 23.26 KG/M2

## 2022-02-03 DIAGNOSIS — I10 HYPERTENSION, ESSENTIAL: Primary | ICD-10-CM

## 2022-02-03 PROCEDURE — 99213 OFFICE O/P EST LOW 20 MIN: CPT | Performed by: SPECIALIST

## 2022-02-03 RX ORDER — METOPROLOL SUCCINATE 50 MG/1
50 TABLET, EXTENDED RELEASE ORAL DAILY
COMMUNITY
End: 2022-05-31

## 2022-03-01 RX ORDER — PREDNISONE 1 MG/1
TABLET ORAL
Qty: 360 TABLET | Refills: 0 | Status: SHIPPED | OUTPATIENT
Start: 2022-03-01 | End: 2022-05-31

## 2022-04-23 ENCOUNTER — HOSPITAL ENCOUNTER (OUTPATIENT)
Facility: HOSPITAL | Age: 77
Setting detail: OBSERVATION
Discharge: HOME | End: 2022-04-25
Attending: EMERGENCY MEDICINE | Admitting: INTERNAL MEDICINE

## 2022-04-23 ENCOUNTER — APPOINTMENT (OUTPATIENT)
Dept: GENERAL RADIOLOGY | Facility: HOSPITAL | Age: 77
End: 2022-04-23

## 2022-04-23 DIAGNOSIS — J18.9 PNEUMONIA DUE TO INFECTIOUS ORGANISM, UNSPECIFIED LATERALITY, UNSPECIFIED PART OF LUNG: Primary | ICD-10-CM

## 2022-04-23 LAB
ALBUMIN SERPL-MCNC: 4.5 G/DL (ref 3.5–5.2)
ALBUMIN/GLOB SERPL: 1.6 G/DL
ALP SERPL-CCNC: 68 U/L (ref 39–117)
ALT SERPL W P-5'-P-CCNC: 17 U/L (ref 1–41)
ANION GAP SERPL CALCULATED.3IONS-SCNC: 9.5 MMOL/L (ref 5–15)
AST SERPL-CCNC: 24 U/L (ref 1–40)
BASOPHILS # BLD AUTO: 0.06 10*3/MM3 (ref 0–0.2)
BASOPHILS NFR BLD AUTO: 0.4 % (ref 0–1.5)
BILIRUB SERPL-MCNC: 0.9 MG/DL (ref 0–1.2)
BUN SERPL-MCNC: 17 MG/DL (ref 8–23)
BUN/CREAT SERPL: 16 (ref 7–25)
CALCIUM SPEC-SCNC: 9.9 MG/DL (ref 8.6–10.5)
CHLORIDE SERPL-SCNC: 105 MMOL/L (ref 98–107)
CO2 SERPL-SCNC: 27.5 MMOL/L (ref 22–29)
CREAT SERPL-MCNC: 1.06 MG/DL (ref 0.76–1.27)
CRP SERPL-MCNC: 4.59 MG/DL (ref 0–0.5)
D-LACTATE SERPL-SCNC: 2.2 MMOL/L (ref 0.5–2)
D-LACTATE SERPL-SCNC: 3 MMOL/L (ref 0.5–2)
DEPRECATED RDW RBC AUTO: 50.6 FL (ref 37–54)
EGFRCR SERPLBLD CKD-EPI 2021: 71.8 ML/MIN/1.73
EOSINOPHIL # BLD AUTO: 0.07 10*3/MM3 (ref 0–0.4)
EOSINOPHIL NFR BLD AUTO: 0.4 % (ref 0.3–6.2)
ERYTHROCYTE [DISTWIDTH] IN BLOOD BY AUTOMATED COUNT: 13.5 % (ref 12.3–15.4)
FLUAV AG NPH QL: NEGATIVE
FLUBV AG NPH QL IA: NEGATIVE
GLOBULIN UR ELPH-MCNC: 2.8 GM/DL
GLUCOSE SERPL-MCNC: 140 MG/DL (ref 65–99)
HCT VFR BLD AUTO: 46.8 % (ref 37.5–51)
HGB BLD-MCNC: 15.3 G/DL (ref 13–17.7)
HOLD SPECIMEN: NORMAL
HOLD SPECIMEN: NORMAL
IMM GRANULOCYTES # BLD AUTO: 0.12 10*3/MM3 (ref 0–0.05)
IMM GRANULOCYTES NFR BLD AUTO: 0.7 % (ref 0–0.5)
LYMPHOCYTES # BLD AUTO: 0.99 10*3/MM3 (ref 0.7–3.1)
LYMPHOCYTES NFR BLD AUTO: 6 % (ref 19.6–45.3)
M PNEUMO IGM SER QL: POSITIVE
MCH RBC QN AUTO: 33 PG (ref 26.6–33)
MCHC RBC AUTO-ENTMCNC: 32.7 G/DL (ref 31.5–35.7)
MCV RBC AUTO: 100.9 FL (ref 79–97)
MONOCYTES # BLD AUTO: 1.4 10*3/MM3 (ref 0.1–0.9)
MONOCYTES NFR BLD AUTO: 8.5 % (ref 5–12)
NEUTROPHILS NFR BLD AUTO: 13.9 10*3/MM3 (ref 1.7–7)
NEUTROPHILS NFR BLD AUTO: 84 % (ref 42.7–76)
NRBC BLD AUTO-RTO: 0 /100 WBC (ref 0–0.2)
NT-PROBNP SERPL-MCNC: 861.8 PG/ML (ref 0–1800)
PLATELET # BLD AUTO: 270 10*3/MM3 (ref 140–450)
PMV BLD AUTO: 9.7 FL (ref 6–12)
POTASSIUM SERPL-SCNC: 4.7 MMOL/L (ref 3.5–5.2)
PROT SERPL-MCNC: 7.3 G/DL (ref 6–8.5)
QT INTERVAL: 436 MS
RBC # BLD AUTO: 4.64 10*6/MM3 (ref 4.14–5.8)
SARS-COV-2 RNA PNL SPEC NAA+PROBE: NOT DETECTED
SODIUM SERPL-SCNC: 142 MMOL/L (ref 136–145)
TROPONIN T SERPL-MCNC: <0.01 NG/ML (ref 0–0.03)
WBC NRBC COR # BLD: 16.54 10*3/MM3 (ref 3.4–10.8)
WHOLE BLOOD HOLD SPECIMEN: NORMAL
WHOLE BLOOD HOLD SPECIMEN: NORMAL

## 2022-04-23 PROCEDURE — 96365 THER/PROPH/DIAG IV INF INIT: CPT

## 2022-04-23 PROCEDURE — U0005 INFEC AGEN DETEC AMPLI PROBE: HCPCS | Performed by: EMERGENCY MEDICINE

## 2022-04-23 PROCEDURE — 87205 SMEAR GRAM STAIN: CPT | Performed by: INTERNAL MEDICINE

## 2022-04-23 PROCEDURE — 25010000002 METHYLPREDNISOLONE PER 125 MG: Performed by: EMERGENCY MEDICINE

## 2022-04-23 PROCEDURE — 87899 AGENT NOS ASSAY W/OPTIC: CPT | Performed by: INTERNAL MEDICINE

## 2022-04-23 PROCEDURE — 99220 PR INITIAL OBSERVATION CARE/DAY 70 MINUTES: CPT | Performed by: INTERNAL MEDICINE

## 2022-04-23 PROCEDURE — 86738 MYCOPLASMA ANTIBODY: CPT | Performed by: INTERNAL MEDICINE

## 2022-04-23 PROCEDURE — 71045 X-RAY EXAM CHEST 1 VIEW: CPT

## 2022-04-23 PROCEDURE — 96367 TX/PROPH/DG ADDL SEQ IV INF: CPT

## 2022-04-23 PROCEDURE — 85025 COMPLETE CBC W/AUTO DIFF WBC: CPT | Performed by: EMERGENCY MEDICINE

## 2022-04-23 PROCEDURE — 94799 UNLISTED PULMONARY SVC/PX: CPT

## 2022-04-23 PROCEDURE — 25010000002 KETOROLAC TROMETHAMINE PER 15 MG: Performed by: EMERGENCY MEDICINE

## 2022-04-23 PROCEDURE — 83605 ASSAY OF LACTIC ACID: CPT | Performed by: EMERGENCY MEDICINE

## 2022-04-23 PROCEDURE — 87040 BLOOD CULTURE FOR BACTERIA: CPT | Performed by: INTERNAL MEDICINE

## 2022-04-23 PROCEDURE — G0378 HOSPITAL OBSERVATION PER HR: HCPCS

## 2022-04-23 PROCEDURE — 93005 ELECTROCARDIOGRAM TRACING: CPT | Performed by: EMERGENCY MEDICINE

## 2022-04-23 PROCEDURE — 25010000002 AZITHROMYCIN PER 500 MG: Performed by: INTERNAL MEDICINE

## 2022-04-23 PROCEDURE — 87804 INFLUENZA ASSAY W/OPTIC: CPT | Performed by: EMERGENCY MEDICINE

## 2022-04-23 PROCEDURE — 83880 ASSAY OF NATRIURETIC PEPTIDE: CPT | Performed by: EMERGENCY MEDICINE

## 2022-04-23 PROCEDURE — 96361 HYDRATE IV INFUSION ADD-ON: CPT

## 2022-04-23 PROCEDURE — 80053 COMPREHEN METABOLIC PANEL: CPT | Performed by: EMERGENCY MEDICINE

## 2022-04-23 PROCEDURE — 96366 THER/PROPH/DIAG IV INF ADDON: CPT

## 2022-04-23 PROCEDURE — U0004 COV-19 TEST NON-CDC HGH THRU: HCPCS | Performed by: EMERGENCY MEDICINE

## 2022-04-23 PROCEDURE — 99285 EMERGENCY DEPT VISIT HI MDM: CPT

## 2022-04-23 PROCEDURE — 96375 TX/PRO/DX INJ NEW DRUG ADDON: CPT

## 2022-04-23 PROCEDURE — 87070 CULTURE OTHR SPECIMN AEROBIC: CPT | Performed by: INTERNAL MEDICINE

## 2022-04-23 PROCEDURE — 94640 AIRWAY INHALATION TREATMENT: CPT

## 2022-04-23 PROCEDURE — 84484 ASSAY OF TROPONIN QUANT: CPT | Performed by: EMERGENCY MEDICINE

## 2022-04-23 PROCEDURE — 86140 C-REACTIVE PROTEIN: CPT | Performed by: INTERNAL MEDICINE

## 2022-04-23 PROCEDURE — 99284 EMERGENCY DEPT VISIT MOD MDM: CPT

## 2022-04-23 PROCEDURE — 25010000002 CEFTRIAXONE PER 250 MG: Performed by: INTERNAL MEDICINE

## 2022-04-23 PROCEDURE — C9803 HOPD COVID-19 SPEC COLLECT: HCPCS

## 2022-04-23 PROCEDURE — 93010 ELECTROCARDIOGRAM REPORT: CPT | Performed by: INTERNAL MEDICINE

## 2022-04-23 PROCEDURE — 94664 DEMO&/EVAL PT USE INHALER: CPT

## 2022-04-23 RX ORDER — SERTRALINE HYDROCHLORIDE 25 MG/1
25 TABLET, FILM COATED ORAL DAILY
Status: DISCONTINUED | OUTPATIENT
Start: 2022-04-23 | End: 2022-04-25 | Stop reason: HOSPADM

## 2022-04-23 RX ORDER — POLYETHYLENE GLYCOL 3350 17 G/17G
17 POWDER, FOR SOLUTION ORAL DAILY PRN
Status: DISCONTINUED | OUTPATIENT
Start: 2022-04-23 | End: 2022-04-25 | Stop reason: HOSPADM

## 2022-04-23 RX ORDER — SODIUM CHLORIDE 0.9 % (FLUSH) 0.9 %
10 SYRINGE (ML) INJECTION AS NEEDED
Status: DISCONTINUED | OUTPATIENT
Start: 2022-04-23 | End: 2022-04-25 | Stop reason: HOSPADM

## 2022-04-23 RX ORDER — BUDESONIDE 0.5 MG/2ML
0.5 INHALANT ORAL
Status: DISCONTINUED | OUTPATIENT
Start: 2022-04-23 | End: 2022-04-25 | Stop reason: HOSPADM

## 2022-04-23 RX ORDER — ALBUTEROL SULFATE 2.5 MG/3ML
5 SOLUTION RESPIRATORY (INHALATION) ONCE
Status: COMPLETED | OUTPATIENT
Start: 2022-04-23 | End: 2022-04-23

## 2022-04-23 RX ORDER — FAMOTIDINE 20 MG/1
40 TABLET, FILM COATED ORAL DAILY
Status: DISCONTINUED | OUTPATIENT
Start: 2022-04-23 | End: 2022-04-25 | Stop reason: HOSPADM

## 2022-04-23 RX ORDER — METHYLPREDNISOLONE SODIUM SUCCINATE 125 MG/2ML
125 INJECTION, POWDER, LYOPHILIZED, FOR SOLUTION INTRAMUSCULAR; INTRAVENOUS ONCE
Status: COMPLETED | OUTPATIENT
Start: 2022-04-23 | End: 2022-04-23

## 2022-04-23 RX ORDER — PREDNISONE 20 MG/1
40 TABLET ORAL
Status: DISCONTINUED | OUTPATIENT
Start: 2022-04-24 | End: 2022-04-25 | Stop reason: HOSPADM

## 2022-04-23 RX ORDER — MULTIVIT AND MINERALS-FERROUS GLUCONATE 9 MG IRON/15 ML ORAL LIQUID 9 MG/15 ML
15 LIQUID (ML) ORAL DAILY
Status: DISCONTINUED | OUTPATIENT
Start: 2022-04-23 | End: 2022-04-23

## 2022-04-23 RX ORDER — BISACODYL 5 MG/1
5 TABLET, DELAYED RELEASE ORAL DAILY PRN
Status: DISCONTINUED | OUTPATIENT
Start: 2022-04-23 | End: 2022-04-25 | Stop reason: HOSPADM

## 2022-04-23 RX ORDER — SODIUM CHLORIDE 0.9 % (FLUSH) 0.9 %
10 SYRINGE (ML) INJECTION EVERY 12 HOURS SCHEDULED
Status: DISCONTINUED | OUTPATIENT
Start: 2022-04-23 | End: 2022-04-25 | Stop reason: HOSPADM

## 2022-04-23 RX ORDER — ONDANSETRON 2 MG/ML
4 INJECTION INTRAMUSCULAR; INTRAVENOUS EVERY 6 HOURS PRN
Status: DISCONTINUED | OUTPATIENT
Start: 2022-04-23 | End: 2022-04-25 | Stop reason: HOSPADM

## 2022-04-23 RX ORDER — BISACODYL 10 MG
10 SUPPOSITORY, RECTAL RECTAL DAILY PRN
Status: DISCONTINUED | OUTPATIENT
Start: 2022-04-23 | End: 2022-04-25 | Stop reason: HOSPADM

## 2022-04-23 RX ORDER — CHOLECALCIFEROL (VITAMIN D3) 125 MCG
5 CAPSULE ORAL NIGHTLY PRN
Status: DISCONTINUED | OUTPATIENT
Start: 2022-04-23 | End: 2022-04-25 | Stop reason: HOSPADM

## 2022-04-23 RX ORDER — CEFTRIAXONE SODIUM 1 G/50ML
1 INJECTION, SOLUTION INTRAVENOUS ONCE
Status: COMPLETED | OUTPATIENT
Start: 2022-04-23 | End: 2022-04-23

## 2022-04-23 RX ORDER — KETOROLAC TROMETHAMINE 15 MG/ML
15 INJECTION, SOLUTION INTRAMUSCULAR; INTRAVENOUS ONCE
Status: COMPLETED | OUTPATIENT
Start: 2022-04-23 | End: 2022-04-23

## 2022-04-23 RX ORDER — METOPROLOL SUCCINATE 50 MG/1
50 TABLET, EXTENDED RELEASE ORAL DAILY
Status: DISCONTINUED | OUTPATIENT
Start: 2022-04-23 | End: 2022-04-25 | Stop reason: HOSPADM

## 2022-04-23 RX ORDER — CEFTRIAXONE SODIUM 1 G/50ML
1 INJECTION, SOLUTION INTRAVENOUS EVERY 24 HOURS
Status: DISCONTINUED | OUTPATIENT
Start: 2022-04-24 | End: 2022-04-25 | Stop reason: HOSPADM

## 2022-04-23 RX ORDER — ALBUTEROL SULFATE 2.5 MG/3ML
2.5 SOLUTION RESPIRATORY (INHALATION) EVERY 6 HOURS PRN
Status: DISCONTINUED | OUTPATIENT
Start: 2022-04-23 | End: 2022-04-25 | Stop reason: HOSPADM

## 2022-04-23 RX ORDER — ACETAMINOPHEN 325 MG/1
650 TABLET ORAL ONCE
Status: COMPLETED | OUTPATIENT
Start: 2022-04-23 | End: 2022-04-23

## 2022-04-23 RX ORDER — IPRATROPIUM BROMIDE AND ALBUTEROL SULFATE 2.5; .5 MG/3ML; MG/3ML
3 SOLUTION RESPIRATORY (INHALATION)
Status: DISCONTINUED | OUTPATIENT
Start: 2022-04-23 | End: 2022-04-25 | Stop reason: HOSPADM

## 2022-04-23 RX ORDER — ARFORMOTEROL TARTRATE 15 UG/2ML
15 SOLUTION RESPIRATORY (INHALATION)
Status: DISCONTINUED | OUTPATIENT
Start: 2022-04-23 | End: 2022-04-25 | Stop reason: HOSPADM

## 2022-04-23 RX ORDER — MULTIPLE VITAMINS W/ MINERALS TAB 9MG-400MCG
1 TAB ORAL DAILY
Status: DISCONTINUED | OUTPATIENT
Start: 2022-04-23 | End: 2022-04-25 | Stop reason: HOSPADM

## 2022-04-23 RX ORDER — AMOXICILLIN 250 MG
2 CAPSULE ORAL 2 TIMES DAILY
Status: DISCONTINUED | OUTPATIENT
Start: 2022-04-23 | End: 2022-04-25 | Stop reason: HOSPADM

## 2022-04-23 RX ADMIN — IPRATROPIUM BROMIDE AND ALBUTEROL SULFATE 3 ML: .5; 3 SOLUTION RESPIRATORY (INHALATION) at 21:01

## 2022-04-23 RX ADMIN — ACETAMINOPHEN 650 MG: 325 TABLET ORAL at 16:45

## 2022-04-23 RX ADMIN — APIXABAN 5 MG: 5 TABLET, FILM COATED ORAL at 21:25

## 2022-04-23 RX ADMIN — ARFORMOTEROL TARTRATE 15 MCG: 15 SOLUTION RESPIRATORY (INHALATION) at 21:01

## 2022-04-23 RX ADMIN — BUDESONIDE 0.5 MG: 0.5 SUSPENSION RESPIRATORY (INHALATION) at 21:01

## 2022-04-23 RX ADMIN — METHYLPREDNISOLONE SODIUM SUCCINATE 125 MG: 125 INJECTION, POWDER, FOR SOLUTION INTRAMUSCULAR; INTRAVENOUS at 16:46

## 2022-04-23 RX ADMIN — KETOROLAC TROMETHAMINE 15 MG: 15 INJECTION, SOLUTION INTRAMUSCULAR; INTRAVENOUS at 16:46

## 2022-04-23 RX ADMIN — SODIUM CHLORIDE 500 ML: 9 INJECTION, SOLUTION INTRAVENOUS at 21:47

## 2022-04-23 RX ADMIN — Medication 10 ML: at 21:26

## 2022-04-23 RX ADMIN — ALBUTEROL SULFATE 5 MG: 2.5 SOLUTION RESPIRATORY (INHALATION) at 16:35

## 2022-04-23 RX ADMIN — CEFTRIAXONE SODIUM 1 G: 1 INJECTION, SOLUTION INTRAVENOUS at 18:28

## 2022-04-23 RX ADMIN — SODIUM CHLORIDE 1000 ML: 9 INJECTION, SOLUTION INTRAVENOUS at 16:44

## 2022-04-23 RX ADMIN — FAMOTIDINE 40 MG: 20 TABLET ORAL at 21:25

## 2022-04-23 NOTE — ED PROVIDER NOTES
Time: 4:12 PM EDT  Arrived by: private car  Chief Complaint: COUGH   History provided by: pt  History is limited by: N/A     History of Present Illness:  Patient is a 76 y.o. male that presents to the emergency department with moderate COUGH that started yesterday afternoon while the pt was working in the yard. The cough is still present and is unchanged. Nothing improves or worsens the cough.     Pt has had one episode of nausea and emesis this morning. He reports CP with coughing only and generalized weakness. No fever.     Pt is vaccinated for COVID-19. He reports no known positive sick contact.     History provided by:  Patient  Cough  Cough characteristics:  Unable to specify  Sputum characteristics:  Unable to specify  Severity:  Moderate  Onset quality:  Sudden  Duration:  1 day  Timing:  Constant  Progression:  Unchanged  Chronicity:  New  Context: not sick contacts    Relieved by:  Nothing  Worsened by:  Nothing  Ineffective treatments:  None tried  Associated symptoms: chest pain (with coughing)    Associated symptoms: no chills, no fever, no headaches, no myalgias, no rhinorrhea, no shortness of breath and no sore throat        Recently seen: Pt was seen at  today and was referred to the ED for further evaluation.     Patient Care Team  Primary Care Provider: Tanvir Garcia DO    Past Medical History:     Allergies   Allergen Reactions   • Codeine GI Intolerance and Nausea Only     Sick at stomach     Past Medical History:   Diagnosis Date   • Anxiety    • Arthritis    • Blood clotting disorder (HCC) 04/27/2017    6 yrs ago pt had blood clot TJ   • Broken bones    • Depression    • Hemorrhoid    • Hypertension    • Nephrolithiasis    • PMR (polymyalgia rheumatica) ([facility])    • Pulmonary embolism (HCC)      Past Surgical History:   Procedure Laterality Date   • ARTHROPLASTY      Artificial Joints/Limbs   • BACK SURGERY      Lumbar   • COLONOSCOPY  2019   • ESOPHAGUS SURGERY      stretching   •  FEMUR FRACTURE SURGERY Left 2019   • HERNIA REPAIR     • HIP FRACTURE SURGERY Left 2019   • KNEE SURGERY Right 2005   • NECK SURGERY     • SHOULDER SURGERY Left 2000    Left Tear     Family History   Problem Relation Age of Onset   • Arthritis Mother    • Leukemia Mother    • Heart disease Brother        Home Medications:  Prior to Admission medications    Medication Sig Start Date End Date Taking? Authorizing Provider   Eliquis 5 MG tablet tablet TAKE ONE TABLET BY MOUTH TWICE A DAY 21   Tanvir Garcia DO   lisinopril (PRINIVIL,ZESTRIL) 20 MG tablet TAKE ONE TABLET BY MOUTH DAILY 22   Tanvir Garcia DO   metoprolol succinate XL (TOPROL-XL) 50 MG 24 hr tablet Take 50 mg by mouth Daily.    Provider, MD Eva   Multiple Vitamin (MULTI-VITAMIN DAILY PO) Take 1 tablet by mouth Daily.    ProviderEva MD   predniSONE (DELTASONE) 1 MG tablet TAKE ONE TABLET BY MOUTH FOUR TIMES A DAY 3/1/22   Tanvir Garcia DO   sertraline (ZOLOFT) 25 MG tablet Take 1 tablet by mouth Daily. 21   Tanvir Garcia DO        Social History:   Social History     Tobacco Use   • Smoking status: Former Smoker     Packs/day: 1.00     Types: Cigarettes     Start date:      Quit date:      Years since quittin.3   • Smokeless tobacco: Never Used   • Tobacco comment: Quit 46 years ago   Vaping Use   • Vaping Use: Never used   Substance Use Topics   • Alcohol use: Never   • Drug use: Never     Recent travel: no     Review of Systems:  Review of Systems   Constitutional: Negative for chills and fever.   HENT: Negative for congestion, rhinorrhea and sore throat.    Eyes: Negative for pain and visual disturbance.   Respiratory: Positive for cough. Negative for apnea, chest tightness and shortness of breath.    Cardiovascular: Positive for chest pain (with coughing). Negative for palpitations.   Gastrointestinal: Positive for nausea and vomiting (x1). Negative for abdominal  "pain and diarrhea.   Genitourinary: Negative for difficulty urinating and dysuria.   Musculoskeletal: Negative for joint swelling and myalgias.   Skin: Negative for color change.   Neurological: Negative for seizures and headaches.   Psychiatric/Behavioral: Negative.    All other systems reviewed and are negative.       Physical Exam:  /68   Pulse 59   Temp (S) 100.1 °F (37.8 °C) (Oral)   Resp 18   Ht 195.1 cm (76.8\")   Wt 88.1 kg (194 lb 3.6 oz)   SpO2 96%   BMI 23.15 kg/m²     Physical Exam  Vitals and nursing note reviewed.   Constitutional:       General: He is not in acute distress.     Appearance: Normal appearance. He is not toxic-appearing.   HENT:      Head: Normocephalic and atraumatic.      Jaw: There is normal jaw occlusion.   Eyes:      General: Lids are normal.      Extraocular Movements: Extraocular movements intact.      Conjunctiva/sclera: Conjunctivae normal.      Pupils: Pupils are equal, round, and reactive to light.   Cardiovascular:      Rate and Rhythm: Normal rate and regular rhythm.      Pulses: Normal pulses.      Heart sounds: Normal heart sounds.   Pulmonary:      Effort: Pulmonary effort is normal. No respiratory distress.      Breath sounds: Normal breath sounds. No wheezing or rhonchi.   Abdominal:      General: Abdomen is flat.      Palpations: Abdomen is soft.      Tenderness: There is no abdominal tenderness. There is no guarding or rebound.   Musculoskeletal:         General: Normal range of motion.      Cervical back: Normal range of motion and neck supple.      Right lower leg: No edema.      Left lower leg: No edema.   Skin:     General: Skin is warm and dry.   Neurological:      Mental Status: He is alert and oriented to person, place, and time. Mental status is at baseline.   Psychiatric:         Mood and Affect: Mood normal.                Medications in the Emergency Department:  Medications   sodium chloride 0.9 % flush 10 mL (has no administration in time " range)   sodium chloride 0.9 % flush 10 mL (has no administration in time range)   sodium chloride 0.9 % flush 10 mL (has no administration in time range)   famotidine (PEPCID) tablet 40 mg (has no administration in time range)   sennosides-docusate (PERICOLACE) 8.6-50 MG per tablet 2 tablet (2 tablets Oral Not Given 4/23/22 2033)     And   polyethylene glycol (MIRALAX) packet 17 g (has no administration in time range)     And   bisacodyl (DULCOLAX) EC tablet 5 mg (has no administration in time range)     And   bisacodyl (DULCOLAX) suppository 10 mg (has no administration in time range)   ondansetron (ZOFRAN) injection 4 mg (has no administration in time range)   multivitamin with minerals 1 tablet (1 tablet Oral Not Given 4/23/22 1950)   melatonin tablet 5 mg (has no administration in time range)   ipratropium-albuterol (DUO-NEB) nebulizer solution 3 mL (3 mL Nebulization Given 4/23/22 2101)   arformoterol (BROVANA) nebulizer solution 15 mcg (15 mcg Nebulization Given 4/23/22 2101)   budesonide (PULMICORT) nebulizer solution 0.5 mg (0.5 mg Nebulization Given 4/23/22 2101)   predniSONE (DELTASONE) tablet 40 mg (has no administration in time range)   albuterol (PROVENTIL) nebulizer solution 0.083% 2.5 mg/3mL (has no administration in time range)   cefTRIAXone (ROCEPHIN) IVPB 1 g (has no administration in time range)   AZITHROMYCIN 500 MG/250 ML 0.9% NS IVPB (vial-mate) (has no administration in time range)   apixaban (ELIQUIS) tablet 5 mg (has no administration in time range)   metoprolol succinate XL (TOPROL-XL) 24 hr tablet 50 mg (50 mg Oral Not Given 4/23/22 1949)   sertraline (ZOLOFT) tablet 25 mg (25 mg Oral Not Given 4/23/22 1951)   ketorolac (TORADOL) injection 15 mg (15 mg Intravenous Given 4/23/22 1646)   sodium chloride 0.9 % bolus 1,000 mL (0 mL Intravenous Stopped 4/23/22 9793)   acetaminophen (TYLENOL) tablet 650 mg (650 mg Oral Given 4/23/22 1645)   albuterol (PROVENTIL) nebulizer solution 0.083% 2.5  mg/3mL (5 mg Nebulization Given 4/23/22 1635)   methylPREDNISolone sodium succinate (SOLU-Medrol) injection 125 mg (125 mg Intravenous Given 4/23/22 1646)   cefTRIAXone (ROCEPHIN) IVPB 1 g (0 g Intravenous Stopped 4/23/22 1854)   AZITHROMYCIN 500 MG/250 ML 0.9% NS IVPB (vial-mate) (500 mg Intravenous New Bag 4/23/22 1900)        Labs  Lab Results (last 24 hours)     Procedure Component Value Units Date/Time    POCT Influenza A/B [[id number]]  (Normal) Collected: 04/23/22 1223    Specimen: Swab Updated: 04/23/22 1224     Rapid Influenza A Ag Negative     Rapid Influenza B Ag Negative     Internal Control Passed     Lot Number [id number]     Expiration Date 53,023    POCT SARS-CoV-2 Antigen LACY   ([facility]) [[id number]]  (Normal) Collected: 04/23/22 1223    Specimen: Swab Updated: 04/23/22 1224     SARS Antigen Not Detected     Internal Control Passed     Lot Number [id number]     Expiration Date 112,022    CBC & Differential [[id number]]  (Abnormal) Collected: 04/23/22 1424    Specimen: Blood Updated: 04/23/22 1434    Narrative:      The following orders were created for panel order CBC & Differential.  Procedure                               Abnormality         Status                     ---------                               -----------         ------                     CBC Auto Differential[[id number]]        Abnormal            Final result                 Please view results for these tests on the individual orders.    Comprehensive Metabolic Panel [[id number]]  (Abnormal) Collected: 04/23/22 1424    Specimen: Blood Updated: 04/23/22 1448     Glucose 140 mg/dL      BUN 17 mg/dL      Creatinine 1.06 mg/dL      Sodium 142 mmol/L      Potassium 4.7 mmol/L      Chloride 105 mmol/L      CO2 27.5 mmol/L      Calcium 9.9 mg/dL      Total Protein 7.3 g/dL      Albumin 4.50 g/dL      ALT (SGPT) 17 U/L      AST (SGOT) 24 U/L      Alkaline Phosphatase 68 U/L      Total Bilirubin 0.9 mg/dL      Globulin 2.8 gm/dL       A/G Ratio 1.6 g/dL      BUN/Creatinine Ratio 16.0     Anion Gap 9.5 mmol/L      eGFR 71.8 mL/min/1.73      Comment: National Kidney Foundation and American Society of Nephrology (ASN) Task Force recommended calculation based on the Chronic Kidney Disease Epidemiology Collaboration (CKD-EPI) equation refit without adjustment for race.       Narrative:      GFR Normal >60  Chronic Kidney Disease <60  Kidney Failure <15      BNP [[id number]]  (Normal) Collected: 04/23/22 1424    Specimen: Blood Updated: 04/23/22 1459     proBNP 861.8 pg/mL     Narrative:      Among patients with dyspnea, NT-proBNP is highly sensitive for the detection of acute congestive heart failure. In addition NT-proBNP of <300 pg/ml effectively rules out acute congestive heart failure with 99% negative predictive value.    Results may be falsely decreased if patient taking Biotin.      Troponin [[id number]]  (Normal) Collected: 04/23/22 1424    Specimen: Blood Updated: 04/23/22 1459     Troponin T <0.010 ng/mL     Narrative:      Troponin T Reference Range:  <= 0.03 ng/mL-   Negative for AMI  >0.03 ng/mL-     Abnormal for myocardial necrosis.  Clinicians would have to utilize clinical acumen, EKG, Troponin and serial changes to determine if it is an Acute Myocardial Infarction or myocardial injury due to an underlying chronic condition.       Results may be falsely decreased if patient taking Biotin.      CBC Auto Differential [[id number]]  (Abnormal) Collected: 04/23/22 1424    Specimen: Blood Updated: 04/23/22 1434     WBC 16.54 10*3/mm3      RBC 4.64 10*6/mm3      Hemoglobin 15.3 g/dL      Hematocrit 46.8 %      .9 fL      MCH 33.0 pg      MCHC 32.7 g/dL      RDW 13.5 %      RDW-SD 50.6 fl      MPV 9.7 fL      Platelets 270 10*3/mm3      Neutrophil % 84.0 %      Lymphocyte % 6.0 %      Monocyte % 8.5 %      Eosinophil % 0.4 %      Basophil % 0.4 %      Immature Grans % 0.7 %      Neutrophils, Absolute 13.90 10*3/mm3      Lymphocytes,  Absolute 0.99 10*3/mm3      Monocytes, Absolute 1.40 10*3/mm3      Eosinophils, Absolute 0.07 10*3/mm3      Basophils, Absolute 0.06 10*3/mm3      Immature Grans, Absolute 0.12 10*3/mm3      nRBC 0.0 /100 WBC     Influenza Antigen, Rapid - Swab, Nasopharynx [[id number]]  (Normal) Collected: 04/23/22 1645    Specimen: Swab from Nasopharynx Updated: 04/23/22 1739     Influenza A Ag, EIA Negative     Influenza B Ag, EIA Negative    COVID-19,APTIMA PANTHER(CAT),[facility], NP/OP SWAB IN UTM/VTM/[facility] TRANSPORT MEDIA,24 HR TAT - Swab, Nasal Cavity [[id number]]  (Normal) Collected: 04/23/22 1645    Specimen: Swab from Nasal Cavity Updated: 04/23/22 2058     COVID19 Not Detected    Narrative:      Fact sheet for providers: https://www.fda.gov/media/912324/download     Fact sheet for patients: https://www.fda.gov/media/697490/download    Test performed by RT PCR.    Lactic Acid, Plasma [[id number]]  (Abnormal) Collected: 04/23/22 1805    Specimen: Blood Updated: 04/23/22 1923     Lactate 2.2 mmol/L     Blood Culture - Blood, Arm, Right [[id number]] Collected: 04/23/22 1805    Specimen: Blood from Arm, Right Updated: 04/23/22 1818    C-reactive Protein [[id number]]  (Abnormal) Collected: 04/23/22 1805    Specimen: Blood Updated: 04/23/22 1853     C-Reactive Protein 4.59 mg/dL     Mycoplasma Pneumoniae Antibody, IgM - Blood, Arm, Left [[id number]]  (Abnormal) Collected: 04/23/22 1805    Specimen: Blood from Arm, Left Updated: 04/23/22 1918     Mycoplasma pneumo IgM Positive    Blood Culture - Blood, Arm, Left [[id number]] Collected: 04/23/22 1809    Specimen: Blood from Arm, Left Updated: 04/23/22 1818    STAT Lactic Acid, Reflex [[id number]] Collected: 04/23/22 2053    Specimen: Blood Updated: 04/23/22 2059           Imaging:  XR Chest 2 View    Result Date: 4/23/2022  PROCEDURE: XR CHEST 2 VW  COMPARISON: [facility], CR, CHEST AP/PA 1 VIEW, 3/27/2021, 13:43.  INDICATIONS: cough, soa, chronic steroid use   FINDINGS:  There is a stable scar in the right perihilar region.  The right hemidiaphragm is slightly elevated.  The lungs and pleural spaces are clear.  The heart size is normal.  The pulmonary vascular markings are normal.  There is incidental chronic calcific granulomatous disease.  The bony thorax is normal for age.       No active disease, stable chest x-ray.     RILEY BRYANT MD       Electronically Signed and Approved By: RILEY BRYANT MD on 4/23/2022 at 12:37             XR Chest 1 View    Result Date: 4/23/2022  PROCEDURE: XR CHEST 1 VW  COMPARISON: [facility], XR CHEST 2 VW, 4/23/2022, 12:17.  INDICATIONS: SOA Triage Protocol  FINDINGS:  There is no interval change from the earlier study.  The right hemidiaphragm is elevated.  There is a stable scar in the right perihilar region.  The remaining lungs and pleural spaces are clear.  The heart size is normal.  There is chronic calcific granulomatous disease.       No interval change from the earlier exam.       RILEY BRYANT MD       Electronically Signed and Approved By: RILEY BRYANT MD on 4/23/2022 at 15:47               Procedures:  Procedures    Progress                            Medical Decision Making:  MDM  Number of Diagnoses or Management Options  Diagnosis management comments: The patient´s CBC was reviewed and shows no abnormalities of critical concern. Of note, there is no anemia requiring a blood transfusion and the platelet count is acceptable.  The patient´s CMP was reviewed and shows no abnormalities of critical concern. Of note, the patient´s sodium and potassium are acceptable. The patient´s liver enzymes are unremarkable. The patient´s renal function (creatinine) is preserved. The patient has a normal anion gap.  Influenza swab is negative.  Lactic acid is 2.2.  Chest x-ray is negative for pneumonia.  Patient was given Rocephin and Zithromax in the ED.  Patient also given an hour-long albuterol breathing treatment and  [facility].    Patient was admitted under the care of Dr. Mack.    Total Critical Care time of 40 minutes. Total critical care time documented does not include time spent on separately billed procedures for services of nurses or physician assistants. I personally saw and examined the patient. I have reviewed all diagnostic interpretations and treatment plans as written. I was present for the key portions of any procedures performed and the inclusive time noted in any critical care statement. Critical care time includes patient management by me, time spent at the patients bedside,  time to review lab and imaging results, discussing patient care, documentation in the medical record, and time spent with family or caregiver.       Amount and/or Complexity of Data Reviewed  Clinical lab tests: reviewed  Tests in the radiology section of CPT®: reviewed  Tests in the medicine section of CPT®: reviewed  Discussion of test results with the performing providers: yes  Discuss the patient with other providers: yes  Independent visualization of images, tracings, or specimens: yes    Risk of Complications, Morbidity, and/or Mortality  Presenting problems: moderate  Management options: moderate    Critical Care  Total time providing critical care: 30-74 minutes    Patient Progress  Patient progress: stable       Final diagnoses:   Pneumonia due to infectious organism, unspecified laterality, unspecified part of lung        Disposition:  ED Disposition     ED Disposition   Decision to Admit    Condition   --    Comment   Level of Care: Med/Surg [1]   Diagnosis: Pneumonia [556812]   Admitting Physician: NED MACK [[id number]]   Attending Physician: NED MACK [[id number]]               Documentation assistance provided by Magaly Fairbanks acting as scribe for Beverly Mcfadden MD. Information recorded by the scribe was done at my direction and has been verified and validated by me.        Magaly Fairbanks  04/23/22  1617       Beverly Mcfadden MD  04/23/22 2115

## 2022-04-23 NOTE — H&P
[facility]  Date: 2022   Patient Name: Walker Ordoñez  : 1945  MRN: [id number]  Primary Care Physician:  Tanvir Garcia DO  Date of admission: 2022    Subjective   Subjective     Chief Complaint: Shortness of breath    HPI:    Walker Ordoñez is a 76 y.o. male with a past medical history significant for PMR on prednisone, hypertension, history of pulmonary embolism on Eliquis who presents to the hospital with a 1 day history of fever, myalgias, shortness of breath, cough.  Patient states that he was working in his yard yesterday, nothing improves or worsens the cough, he does report some chest pain with coughing in addition to generalized weakness.  Patient is reportedly vaccinated for COVID-19 including booster.  Patient denies any sick contacts.  In the emergency department the patient was found to be hypoxic requiring 2 L nasal cannula to maintain saturations above 90%.  Blood cultures were ordered, patient was given nebulized breathing treatments as well as steroids.  I requested the ER physician give the patient antibiotics, the patient is admitted to observation for further evaluation.      Personal History     Past Medical History:  Past Medical History:   Diagnosis Date   • Anxiety    • Arthritis    • Blood clotting disorder (HCC) 2017   • Broken bones    • Depression    • Hemorrhoid    • Hypertension    • Nephrolithiasis    • PMR (polymyalgia rheumatica) (HCC)    • Pulmonary embolism (HCC)        Past Surgical History:  Past Surgical History:   Procedure Laterality Date   • ARTHROPLASTY      Artificial Joints/Limbs   • BACK SURGERY      Lumbar   • COLONOSCOPY     • ESOPHAGUS SURGERY      stretching   • FEMUR FRACTURE SURGERY Left    • HERNIA REPAIR     • HIP FRACTURE SURGERY Left 2019   • KNEE SURGERY Right    • NECK SURGERY     • SHOULDER SURGERY Left     Left Tear       Family History:   family history includes  Arthritis in his mother; Heart disease in his brother; Leukemia in his mother.    Social History:    reports that he quit smoking about 50 years ago. His smoking use included cigarettes. He started smoking about 57 years ago. He smoked 1.00 pack per day. He has never used smokeless tobacco. He reports that he does not drink alcohol and does not use drugs.    Home Medications:  apixaban, lisinopril, metoprolol succinate XL, multivitamin, predniSONE, and sertraline    Allergies:  Allergies   Allergen Reactions   • Codeine GI Intolerance and Nausea Only     Sick at stomach       Review of Systems:  14 point review of systems negative other than stated in HPI    Objective   Objective     Vitals:   Temp:  [98.6 °F (37 °C)-100.1 °F (37.8 °C)] 100.1 °F (37.8 °C)  Heart Rate:  [57-73] 70  Resp:  [18-23] 18  BP: (109-139)/(50-68) 109/68    Physical Exam    Constitutional: Awake, alert, no acute distress   Eyes: Pupils equal, sclerae anicteric, no conjunctival injection,    HENT: NCAT, mucous membranes moist, nasal cannula in place   Neck: Supple, no thyromegaly, no lymphadenopathy, trachea midline   Respiratory: Clear to auscultation bilaterally, nonlabored respirations    Cardiovascular: RRR, no murmurs, rubs, or gallops   Gastrointestinal: Positive bowel sounds, soft, nontender, nondistended   Musculoskeletal: No bilateral ankle edema, no clubbing or cyanosis to extremities   Psychiatric: Appropriate affect, cooperative   Neurologic: Oriented x 3, strength symmetric in all extremities, Cranial Nerves grossly intact to confrontation, speech clear   Skin: No rashes     Result Review:  I have personally reviewed the results from the time of this admission to 4/23/2022 17:54 EDT and agree with these findings:  [x]  Laboratory  CMP    CMP 11/4/21 4/23/22   Glucose 100 (A) 140 (A)   BUN 10 17   Creatinine 0.81 1.06   eGFR Non African Am 92    Sodium 140 142   Potassium 4.5 4.7   Chloride 104 105   Calcium 9.4 9.9   Albumin 4.20  4.50   Total Bilirubin 0.4 0.9   Alkaline Phosphatase 73 68   AST (SGOT) 22 24   ALT (SGPT) 16 17   (A) Abnormal value            CBC    CBC 11/4/21 4/23/22   WBC 9.31 16.54 (A)   RBC 4.54 4.64   Hemoglobin 15.1 15.3   Hematocrit 45.2 46.8   MCV 99.6 (A) 100.9 (A)   MCH 33.3 (A) 33.0   MCHC 33.4 32.7   RDW 12.9 13.5   Platelets 319 270   (A) Abnormal value            []  Microbiology  []  Radiology  []  EKG/Telemetry   []  Cardiology/Vascular   []  Pathology  []  Old records  []  Other:      Assessment/Plan   Assessment / Plan     Assessment:   Hypoxia  Concern for pneumonia viral versus bacterial  Myalgias  Weakness  Fever  Leukocytosis  Depression  PMR  History of PE    Plan:  • Patient admitted to the hospital for further work-up and management of above  • Blood cultures, sputum cultures ordered and pending  • Check urine antigens, strep pneumo, Legionella, mycoplasma IgM  • Steroids, Brovana, Pulmicort, DuoNebs  • Wean O2 for SPO2 greater than 90%  • Out of bed to chair 3 times daily  • Rocephin, azithromycin  • Resume appropriate home medications once reconciled  • Clinical course will dictate further management    DVT prophylaxis: Eliquis  GI: Pepcid  Diet: Regular  Telemetry: Reviewed, sinus    Reviewed patients labs and imaging, and discussed with patient and nurse at bedside, discussed with Dr. Jimenez    CODE STATUS:         Admission Status:  I believe this patient meets observation status.      Electronically signed by Tavo Moyer MD, 04/23/22, 5:54 PM EDT.

## 2022-04-24 LAB
ALBUMIN SERPL-MCNC: 3.5 G/DL (ref 3.5–5.2)
ALBUMIN/GLOB SERPL: 1.4 G/DL
ALP SERPL-CCNC: 48 U/L (ref 39–117)
ALT SERPL W P-5'-P-CCNC: 14 U/L (ref 1–41)
ANION GAP SERPL CALCULATED.3IONS-SCNC: 8.7 MMOL/L (ref 5–15)
AST SERPL-CCNC: 19 U/L (ref 1–40)
BASOPHILS # BLD AUTO: 0.02 10*3/MM3 (ref 0–0.2)
BASOPHILS NFR BLD AUTO: 0.1 % (ref 0–1.5)
BILIRUB SERPL-MCNC: 0.5 MG/DL (ref 0–1.2)
BUN SERPL-MCNC: 21 MG/DL (ref 8–23)
BUN/CREAT SERPL: 20.6 (ref 7–25)
CALCIUM SPEC-SCNC: 8.7 MG/DL (ref 8.6–10.5)
CHLORIDE SERPL-SCNC: 107 MMOL/L (ref 98–107)
CO2 SERPL-SCNC: 22.3 MMOL/L (ref 22–29)
CREAT SERPL-MCNC: 1.02 MG/DL (ref 0.76–1.27)
D-LACTATE SERPL-SCNC: 1.3 MMOL/L (ref 0.5–2)
DEPRECATED RDW RBC AUTO: 48.9 FL (ref 37–54)
EGFRCR SERPLBLD CKD-EPI 2021: 75.2 ML/MIN/1.73
EOSINOPHIL # BLD AUTO: 0 10*3/MM3 (ref 0–0.4)
EOSINOPHIL NFR BLD AUTO: 0 % (ref 0.3–6.2)
ERYTHROCYTE [DISTWIDTH] IN BLOOD BY AUTOMATED COUNT: 13.5 % (ref 12.3–15.4)
GLOBULIN UR ELPH-MCNC: 2.5 GM/DL
GLUCOSE SERPL-MCNC: 160 MG/DL (ref 65–99)
HCT VFR BLD AUTO: 39 % (ref 37.5–51)
HGB BLD-MCNC: 13.2 G/DL (ref 13–17.7)
IMM GRANULOCYTES # BLD AUTO: 0.25 10*3/MM3 (ref 0–0.05)
IMM GRANULOCYTES NFR BLD AUTO: 1.1 % (ref 0–0.5)
L PNEUMO1 AG UR QL IA: NEGATIVE
LYMPHOCYTES # BLD AUTO: 0.59 10*3/MM3 (ref 0.7–3.1)
LYMPHOCYTES NFR BLD AUTO: 2.5 % (ref 19.6–45.3)
MAGNESIUM SERPL-MCNC: 2.1 MG/DL (ref 1.6–2.4)
MCH RBC QN AUTO: 33.7 PG (ref 26.6–33)
MCHC RBC AUTO-ENTMCNC: 33.8 G/DL (ref 31.5–35.7)
MCV RBC AUTO: 99.5 FL (ref 79–97)
MONOCYTES # BLD AUTO: 0.59 10*3/MM3 (ref 0.1–0.9)
MONOCYTES NFR BLD AUTO: 2.5 % (ref 5–12)
NEUTROPHILS NFR BLD AUTO: 21.96 10*3/MM3 (ref 1.7–7)
NEUTROPHILS NFR BLD AUTO: 93.8 % (ref 42.7–76)
NRBC BLD AUTO-RTO: 0 /100 WBC (ref 0–0.2)
PHOSPHATE SERPL-MCNC: 2.5 MG/DL (ref 2.5–4.5)
PLATELET # BLD AUTO: 232 10*3/MM3 (ref 140–450)
PMV BLD AUTO: 9.8 FL (ref 6–12)
POTASSIUM SERPL-SCNC: 4.5 MMOL/L (ref 3.5–5.2)
PROCALCITONIN SERPL-MCNC: 0.95 NG/ML (ref 0–0.25)
PROT SERPL-MCNC: 6 G/DL (ref 6–8.5)
RBC # BLD AUTO: 3.92 10*6/MM3 (ref 4.14–5.8)
S PNEUM AG SPEC QL LA: NEGATIVE
SODIUM SERPL-SCNC: 138 MMOL/L (ref 136–145)
WBC NRBC COR # BLD: 23.41 10*3/MM3 (ref 3.4–10.8)

## 2022-04-24 PROCEDURE — 99225 PR SBSQ OBSERVATION CARE/DAY 25 MINUTES: CPT | Performed by: INTERNAL MEDICINE

## 2022-04-24 PROCEDURE — 83735 ASSAY OF MAGNESIUM: CPT | Performed by: INTERNAL MEDICINE

## 2022-04-24 PROCEDURE — 25010000002 AZITHROMYCIN PER 500 MG: Performed by: INTERNAL MEDICINE

## 2022-04-24 PROCEDURE — 63710000001 PREDNISONE PER 1 MG: Performed by: INTERNAL MEDICINE

## 2022-04-24 PROCEDURE — 36415 COLL VENOUS BLD VENIPUNCTURE: CPT | Performed by: INTERNAL MEDICINE

## 2022-04-24 PROCEDURE — 94799 UNLISTED PULMONARY SVC/PX: CPT

## 2022-04-24 PROCEDURE — 84145 PROCALCITONIN (PCT): CPT | Performed by: INTERNAL MEDICINE

## 2022-04-24 PROCEDURE — 84100 ASSAY OF PHOSPHORUS: CPT | Performed by: INTERNAL MEDICINE

## 2022-04-24 PROCEDURE — 83605 ASSAY OF LACTIC ACID: CPT | Performed by: PHYSICIAN ASSISTANT

## 2022-04-24 PROCEDURE — 85025 COMPLETE CBC W/AUTO DIFF WBC: CPT | Performed by: INTERNAL MEDICINE

## 2022-04-24 PROCEDURE — 80053 COMPREHEN METABOLIC PANEL: CPT | Performed by: INTERNAL MEDICINE

## 2022-04-24 PROCEDURE — 25010000002 CEFTRIAXONE PER 250 MG: Performed by: INTERNAL MEDICINE

## 2022-04-24 PROCEDURE — G0378 HOSPITAL OBSERVATION PER HR: HCPCS

## 2022-04-24 RX ADMIN — BUDESONIDE 0.5 MG: 0.5 SUSPENSION RESPIRATORY (INHALATION) at 08:08

## 2022-04-24 RX ADMIN — BUDESONIDE 0.5 MG: 0.5 SUSPENSION RESPIRATORY (INHALATION) at 19:30

## 2022-04-24 RX ADMIN — ARFORMOTEROL TARTRATE 15 MCG: 15 SOLUTION RESPIRATORY (INHALATION) at 08:07

## 2022-04-24 RX ADMIN — FAMOTIDINE 40 MG: 20 TABLET ORAL at 08:14

## 2022-04-24 RX ADMIN — APIXABAN 5 MG: 5 TABLET, FILM COATED ORAL at 08:14

## 2022-04-24 RX ADMIN — IPRATROPIUM BROMIDE AND ALBUTEROL SULFATE 3 ML: .5; 3 SOLUTION RESPIRATORY (INHALATION) at 19:30

## 2022-04-24 RX ADMIN — PREDNISONE 40 MG: 20 TABLET ORAL at 08:14

## 2022-04-24 RX ADMIN — CEFTRIAXONE SODIUM 1 G: 1 INJECTION, SOLUTION INTRAVENOUS at 17:35

## 2022-04-24 RX ADMIN — ARFORMOTEROL TARTRATE 15 MCG: 15 SOLUTION RESPIRATORY (INHALATION) at 19:30

## 2022-04-24 RX ADMIN — IPRATROPIUM BROMIDE AND ALBUTEROL SULFATE 3 ML: .5; 3 SOLUTION RESPIRATORY (INHALATION) at 13:56

## 2022-04-24 RX ADMIN — MULTIPLE VITAMINS W/ MINERALS TAB 1 TABLET: TAB at 08:14

## 2022-04-24 RX ADMIN — IPRATROPIUM BROMIDE AND ALBUTEROL SULFATE 3 ML: .5; 3 SOLUTION RESPIRATORY (INHALATION) at 08:08

## 2022-04-24 RX ADMIN — SERTRALINE 25 MG: 25 TABLET, FILM COATED ORAL at 08:14

## 2022-04-24 RX ADMIN — AZITHROMYCIN 500 MG: 500 INJECTION, POWDER, LYOPHILIZED, FOR SOLUTION INTRAVENOUS at 20:36

## 2022-04-24 RX ADMIN — Medication 10 ML: at 08:15

## 2022-04-24 RX ADMIN — Medication 10 ML: at 20:40

## 2022-04-24 RX ADMIN — METOPROLOL SUCCINATE 50 MG: 50 TABLET, EXTENDED RELEASE ORAL at 08:14

## 2022-04-24 RX ADMIN — Medication 5 MG: at 20:39

## 2022-04-24 RX ADMIN — APIXABAN 5 MG: 5 TABLET, FILM COATED ORAL at 20:39

## 2022-04-24 NOTE — SIGNIFICANT NOTE
04/24/22 0938   Plan   Plan Patient reports lives by himself.  Good family support and reports daughter Griselda is POA.  Patient is very independent and provides own IADL's.  Verified facesheet.  Patient plans to return home with no needs at this time.

## 2022-04-24 NOTE — PLAN OF CARE
Goal Outcome Evaluation:      Patient rested through the night with no complaints. He has been SB on the monitor and sits in the 40's during sleep.  He says he is feeling much better after the breathing treatments.  Continuing to monitor.

## 2022-04-24 NOTE — PLAN OF CARE
Goal Outcome Evaluation:  Plan of Care Reviewed With: patient        Progress: improving     VSS. Pt is on room air no complaints of pain or discomfort. No fever this shift. Pt able to ambulate in room ad calvin with no issue. Does remain stephon off and on.

## 2022-04-24 NOTE — PROGRESS NOTES
" [facility]   Hospitalist Progress Note  Date: 2022  Patient Name: Walker Ordoñez  : 1945  MRN: [id number]  Date of admission: 2022      Subjective   Subjective     Chief Complaint:  Shortness of breath    Summary: 76-year-old male with history of PMR on prednisone, PE, and hypertension.  He presented to the hospital with 1 day of fever, myalgia, SOA, cough.  He denied any sick contacts.  In ED he was found to be hypoxic requiring 2 L of nasal cannula to maintain oxygen saturations above 90%.  Blood cultures were ordered, he was given nebulized breathing treatments and steroids and admitted to the medicine service.  He was started on antibiotics.    Interval Followup: Has been weaned off of oxygen and feels good.  Coughing less. Breathing better.    Review of Systems   All systems were reviewed and negative except for what is outlined in \"Interval followup\"    Objective   Objective     Vitals:   Temp:  [97.7 °F (36.5 °C)-100.1 °F (37.8 °C)] 97.9 °F (36.6 °C)  Heart Rate:  [52-73] 59  Resp:  [16-23] 18  BP: (109-136)/(46-68) 117/47  Flow (L/min):  [2] 2  Physical Exam    Constitutional: Awake, alert, no acute distress   Eyes: Pupils equal, sclerae anicteric, no conjunctival injection   HENT: NCAT, mucous membranes moist   Neck: Supple, no thyromegaly, no lymphadenopathy, trachea midline   Respiratory: clear except for a few rhonchi at bases.   Cardiovascular: RRR, no murmurs, rubs, or gallops, palpable pedal pulses bilaterally   Gastrointestinal: Positive bowel sounds, soft, nontender, nondistended   Musculoskeletal: No bilateral ankle edema, no clubbing or cyanosis to extremities   Psychiatric: Appropriate affect, cooperative and pleasant male   Neurologic: Oriented x 3, no gross focal deficits   Skin: No rashes     Result Review    Result Review:  I have personally reviewed the results from the time of this admission to 2022 15:57 EDT and agree with these findings:  [x]  Laboratory   LAB " RESULTS:      Lab 04/24/22  1002 04/24/22  0608 04/23/22  2053 04/23/22  1805 04/23/22  1424   WBC 23.41*  --   --   --  16.54*   HEMOGLOBIN 13.2  --   --   --  15.3   HEMATOCRIT 39.0  --   --   --  46.8   PLATELETS 232  --   --   --  270   NEUTROS ABS 21.96*  --   --   --  13.90*   IMMATURE GRANS (ABS) 0.25*  --   --   --  0.12*   LYMPHS ABS 0.59*  --   --   --  0.99   MONOS ABS 0.59  --   --   --  1.40*   EOS ABS 0.00  --   --   --  0.07   MCV 99.5*  --   --   --  100.9*   CRP  --   --   --  4.59*  --    LACTATE  --  1.3 3.0* 2.2*  --          Lab 04/24/22  0608 04/23/22  1424   SODIUM 138 142   POTASSIUM 4.5 4.7   CHLORIDE 107 105   CO2 22.3 27.5   ANION GAP 8.7 9.5   BUN 21 17   CREATININE 1.02 1.06   EGFR 75.2 71.8   GLUCOSE 160* 140*   CALCIUM 8.7 9.9   MAGNESIUM 2.1  --    PHOSPHORUS 2.5  --          Lab 04/24/22  0608 04/23/22  1424   TOTAL PROTEIN 6.0 7.3   ALBUMIN 3.50 4.50   GLOBULIN 2.5 2.8   ALT (SGPT) 14 17   AST (SGOT) 19 24   BILIRUBIN 0.5 0.9   ALK PHOS 48 68         Lab 04/23/22  1424   PROBNP 861.8   TROPONIN T <0.010                 Brief Urine Lab Results     None        Microbiology Results (last 10 days)     Procedure Component Value - Date/Time    S. Pneumo Ag Urine or CSF - Urine, Urine, Clean Catch [[id number]]  (Normal) Collected: 04/23/22 2227    Lab Status: Final result Specimen: Urine, Clean Catch Updated: 04/24/22 0759     Strep Pneumo Ag Negative    Legionella Antigen, Urine - Urine, Urine, Clean Catch [[id number]]  (Normal) Collected: 04/23/22 2227    Lab Status: Final result Specimen: Urine, Clean Catch Updated: 04/24/22 0759     LEGIONELLA ANTIGEN, URINE Negative    Respiratory Culture - Sputum, Cough [[id number]] Collected: 04/23/22 2119    Lab Status: Preliminary result Specimen: Sputum from Cough Updated: 04/24/22 0551     Gram Stain Many (4+) WBCs seen      Many (4+) Gram positive cocci      Few (2+) Epithelial cells seen    Mycoplasma Pneumoniae Antibody, IgM - Blood, Arm, Left  [[id number]]  (Abnormal) Collected: 04/23/22 1805    Lab Status: Final result Specimen: Blood from Arm, Left Updated: 04/23/22 1918     Mycoplasma pneumo IgM Positive    Influenza Antigen, Rapid - Swab, Nasopharynx [[id number]]  (Normal) Collected: 04/23/22 1645    Lab Status: Final result Specimen: Swab from Nasopharynx Updated: 04/23/22 1739     Influenza A Ag, EIA Negative     Influenza B Ag, EIA Negative    COVID-19,APTIMA PANTHER(CAT),[facility], NP/OP SWAB IN UTM/VTM/SALINE TRANSPORT MEDIA,24 HR TAT - Swab, Nasal Cavity [[id number]]  (Normal) Collected: 04/23/22 1645    Lab Status: Final result Specimen: Swab from Nasal Cavity Updated: 04/23/22 2058     COVID19 Not Detected    Narrative:      Fact sheet for providers: https://www.fda.gov/media/339913/download     Fact sheet for patients: https://www.fda.gov/media/575063/download    Test performed by RT PCR.          []  Microbiology  []  Radiology  []  EKG/Telemetry   []  Cardiology/Vascular   []  Pathology  []  Old records  []  Other:    Assessment/Plan   Assessment / Plan     Assessment:  · Hypoxia  · Mycoplasma pneumonia  · Leukocytosis (likely exacerbated by steroids)  · History of PE requiring anticoagulation  · Depression  · PMR    Plan:  · Continues on Rocephin/Zithromax  · Breathing treatments  · Check PCT  · OOB to chair  · Continue Eliquis  · Probably DC tomorrow if doing well.        DVT prophylaxis:  Medical and mechanical DVT prophylaxis orders are present.    CODE STATUS:      Electronically signed by Jayden Casper MD, 04/24/22, 7:41 PM EDT.

## 2022-04-25 ENCOUNTER — READMISSION MANAGEMENT (OUTPATIENT)
Dept: CALL CENTER | Facility: HOSPITAL | Age: 77
End: 2022-04-25

## 2022-04-25 VITALS
BODY MASS INDEX: 22.93 KG/M2 | RESPIRATION RATE: 16 BRPM | HEART RATE: 55 BPM | TEMPERATURE: 97.8 F | WEIGHT: 194.22 LBS | SYSTOLIC BLOOD PRESSURE: 145 MMHG | OXYGEN SATURATION: 96 % | DIASTOLIC BLOOD PRESSURE: 68 MMHG | HEIGHT: 77 IN

## 2022-04-25 LAB
ALBUMIN SERPL-MCNC: 3.7 G/DL (ref 3.5–5.2)
ANION GAP SERPL CALCULATED.3IONS-SCNC: 9 MMOL/L (ref 5–15)
BUN SERPL-MCNC: 27 MG/DL (ref 8–23)
BUN/CREAT SERPL: 30.7 (ref 7–25)
CALCIUM SPEC-SCNC: 9.2 MG/DL (ref 8.6–10.5)
CHLORIDE SERPL-SCNC: 106 MMOL/L (ref 98–107)
CO2 SERPL-SCNC: 26 MMOL/L (ref 22–29)
CREAT SERPL-MCNC: 0.88 MG/DL (ref 0.76–1.27)
DEPRECATED RDW RBC AUTO: 50.5 FL (ref 37–54)
EGFRCR SERPLBLD CKD-EPI 2021: 88 ML/MIN/1.73
ERYTHROCYTE [DISTWIDTH] IN BLOOD BY AUTOMATED COUNT: 13.8 % (ref 12.3–15.4)
GLUCOSE SERPL-MCNC: 101 MG/DL (ref 65–99)
HCT VFR BLD AUTO: 38.8 % (ref 37.5–51)
HGB BLD-MCNC: 13.1 G/DL (ref 13–17.7)
MCH RBC QN AUTO: 33.9 PG (ref 26.6–33)
MCHC RBC AUTO-ENTMCNC: 33.8 G/DL (ref 31.5–35.7)
MCV RBC AUTO: 100.5 FL (ref 79–97)
PHOSPHATE SERPL-MCNC: 2 MG/DL (ref 2.5–4.5)
PLATELET # BLD AUTO: 245 10*3/MM3 (ref 140–450)
PMV BLD AUTO: 9.6 FL (ref 6–12)
POTASSIUM SERPL-SCNC: 4.6 MMOL/L (ref 3.5–5.2)
RBC # BLD AUTO: 3.86 10*6/MM3 (ref 4.14–5.8)
SODIUM SERPL-SCNC: 141 MMOL/L (ref 136–145)
WBC NRBC COR # BLD: 19.09 10*3/MM3 (ref 3.4–10.8)

## 2022-04-25 PROCEDURE — 80069 RENAL FUNCTION PANEL: CPT | Performed by: INTERNAL MEDICINE

## 2022-04-25 PROCEDURE — 63710000001 PREDNISONE PER 1 MG: Performed by: INTERNAL MEDICINE

## 2022-04-25 PROCEDURE — 94664 DEMO&/EVAL PT USE INHALER: CPT

## 2022-04-25 PROCEDURE — 99217 PR OBSERVATION CARE DISCHARGE MANAGEMENT: CPT | Performed by: INTERNAL MEDICINE

## 2022-04-25 PROCEDURE — G0378 HOSPITAL OBSERVATION PER HR: HCPCS

## 2022-04-25 PROCEDURE — 85027 COMPLETE CBC AUTOMATED: CPT | Performed by: INTERNAL MEDICINE

## 2022-04-25 PROCEDURE — 94799 UNLISTED PULMONARY SVC/PX: CPT

## 2022-04-25 RX ORDER — ALBUTEROL SULFATE 90 UG/1
2 AEROSOL, METERED RESPIRATORY (INHALATION) EVERY 4 HOURS PRN
Qty: 18 G | Refills: 0 | Status: SHIPPED | OUTPATIENT
Start: 2022-04-25 | End: 2022-05-25

## 2022-04-25 RX ORDER — CEFDINIR 300 MG/1
300 CAPSULE ORAL 2 TIMES DAILY
Qty: 10 CAPSULE | Refills: 0 | Status: SHIPPED | OUTPATIENT
Start: 2022-04-25 | End: 2022-04-30

## 2022-04-25 RX ORDER — PREDNISONE 20 MG/1
20 TABLET ORAL DAILY
Qty: 3 TABLET | Refills: 0 | Status: SHIPPED | OUTPATIENT
Start: 2022-04-25 | End: 2022-04-28

## 2022-04-25 RX ORDER — AZITHROMYCIN 500 MG/1
500 TABLET, FILM COATED ORAL DAILY
Qty: 3 TABLET | Refills: 0 | Status: SHIPPED | OUTPATIENT
Start: 2022-04-25 | End: 2022-04-28

## 2022-04-25 RX ADMIN — IPRATROPIUM BROMIDE AND ALBUTEROL SULFATE 3 ML: .5; 3 SOLUTION RESPIRATORY (INHALATION) at 12:09

## 2022-04-25 RX ADMIN — SERTRALINE 25 MG: 25 TABLET, FILM COATED ORAL at 08:33

## 2022-04-25 RX ADMIN — BUDESONIDE 0.5 MG: 0.5 SUSPENSION RESPIRATORY (INHALATION) at 07:39

## 2022-04-25 RX ADMIN — APIXABAN 5 MG: 5 TABLET, FILM COATED ORAL at 08:33

## 2022-04-25 RX ADMIN — PREDNISONE 40 MG: 20 TABLET ORAL at 08:33

## 2022-04-25 RX ADMIN — Medication 10 ML: at 08:34

## 2022-04-25 RX ADMIN — IPRATROPIUM BROMIDE AND ALBUTEROL SULFATE 3 ML: .5; 3 SOLUTION RESPIRATORY (INHALATION) at 07:38

## 2022-04-25 RX ADMIN — MULTIPLE VITAMINS W/ MINERALS TAB 1 TABLET: TAB at 08:33

## 2022-04-25 RX ADMIN — ARFORMOTEROL TARTRATE 15 MCG: 15 SOLUTION RESPIRATORY (INHALATION) at 07:38

## 2022-04-25 RX ADMIN — FAMOTIDINE 40 MG: 20 TABLET ORAL at 08:33

## 2022-04-25 RX ADMIN — METOPROLOL SUCCINATE 50 MG: 50 TABLET, EXTENDED RELEASE ORAL at 08:33

## 2022-04-25 NOTE — DISCHARGE SUMMARY
[facility]         HOSPITALIST  DISCHARGE SUMMARY    Patient Name: Walker Ordoñez  : 1945  MRN: [id number]     Date of Admission: 2022  Date of Discharge:  2022  Primary Care Physician: Tanvir Garcia DO   Admitting Service:  Hospital Medicine  Consultants: Note     Final Diagnoses:  ? Hypoxia  ? Mycoplasma pneumonia  ? Leukocytosis, likely exacerbated by steroids  ? History of PE requiring anticoagulation      Hospital Course     Hospital Course:  Walker Ordoñez is a very pleasant 76-year-old male with history of PMR on prednisone, PE, and hypertension.  He presented to the hospital with 1 day of fever, myalgia, SOA, cough.  He denied any sick contacts.  In ED he was found to be hypoxic requiring 2 L of nasal cannula to maintain oxygen saturations above 90%.  Blood cultures were ordered, he was given nebulized breathing treatments and steroids and admitted to the medicine service.  He was started on antibiotics.  He has done very well and is stable for discharge today on oral antibiotics.  His Mycoplasma ab returned positive.      DISCHARGE Follow Up Recommendations for labs and diagnostics: f/u with PCP 1-2 weeks; recommend f/u CXR 2-4 weeks with PCP to ensure resolution      Day of Discharge     Vital Signs:  Temp:  [97.3 °F (36.3 °C)-98.4 °F (36.9 °C)] 97.8 °F (36.6 °C)  Heart Rate:  [53-71] 55  Resp:  [16-18] 16  BP: (119-145)/(52-68) 145/68  Physical Exam: Pleasant well-appearing male in NAD; lungs clear      Discharge Details        Discharge Medications      New Medications      Instructions Start Date   albuterol sulfate  (90 Base) MCG/ACT inhaler  Commonly known as: PROVENTIL HFA;VENTOLIN HFA;PROAIR HFA   2 puffs, Inhalation, Every 4 Hours PRN, Pneumonia      azithromycin 500 MG tablet  Commonly known as: Zithromax   500 mg, Oral, Daily      cefdinir 300 MG capsule  Commonly known as: OMNICEF   300 mg, Oral, 2 Times Daily         Changes to Medications       Instructions Start Date   predniSONE 1 MG tablet  Commonly known as: DELTASONE  What changed: Another medication with the same name was added. Make sure you understand how and when to take each.   TAKE ONE TABLET BY MOUTH FOUR TIMES A DAY      predniSONE 20 MG tablet  Commonly known as: DELTASONE  What changed: You were already taking a medication with the same name, and this prescription was added. Make sure you understand how and when to take each.   20 mg, Oral, Daily         Continue These Medications      Instructions Start Date   Eliquis 5 MG tablet tablet  Generic drug: apixaban   TAKE ONE TABLET BY MOUTH TWICE A DAY      lisinopril 20 MG tablet  Commonly known as: PRINIVIL,ZESTRIL   TAKE ONE TABLET BY MOUTH DAILY      metoprolol succinate XL 50 MG 24 hr tablet  Commonly known as: TOPROL-XL   50 mg, Oral, Daily      multivitamin tablet tablet  Commonly known as: THERAGRAN   1 tablet, Oral, Daily      sertraline 25 MG tablet  Commonly known as: ZOLOFT   25 mg, Oral, Daily             Allergies   Allergen Reactions   • Codeine GI Intolerance and Nausea Only     Sick at stomach       Discharge Disposition:  Home or Self Care    Diet:  Hospital:  Diet Order   Procedures   • Diet Regular       Discharge Activity:       CODE STATUS:  There are no questions and answers to display.         Future Appointments   Date Time Provider Department Center   5/5/2022  9:45 AM Tanvir Garcia DO [facility]   8/5/2022 10:15 AM Bimal Rivera MD [facility]       Additional Instructions for the Follow-ups that You Need to Schedule     Discharge Follow-up with PCP   As directed       Currently Documented PCP:    Tanvir Garcia DO    PCP Phone Number:    [phone]     Follow Up Details: 1-2 weeks               Pertinent  and/or Most Recent Results     PROCEDURES:   None    LAB RESULTS:      Lab 04/25/22  1057 04/24/22  1002 04/24/22  0608 04/23/22  2053 04/23/22  1805 04/23/22  1424   WBC  19.09* 23.41*  --   --   --  16.54*   HEMOGLOBIN 13.1 13.2  --   --   --  15.3   HEMATOCRIT 38.8 39.0  --   --   --  46.8   PLATELETS 245 232  --   --   --  270   NEUTROS ABS  --  21.96*  --   --   --  13.90*   IMMATURE GRANS (ABS)  --  0.25*  --   --   --  0.12*   LYMPHS ABS  --  0.59*  --   --   --  0.99   MONOS ABS  --  0.59  --   --   --  1.40*   EOS ABS  --  0.00  --   --   --  0.07   .5* 99.5*  --   --   --  100.9*   CRP  --   --   --   --  4.59*  --    PROCALCITONIN  --   --  0.95*  --   --   --    LACTATE  --   --  1.3 3.0* 2.2*  --          Lab 04/25/22  1057 04/24/22  0608 04/23/22  1424   SODIUM 141 138 142   POTASSIUM 4.6 4.5 4.7   CHLORIDE 106 107 105   CO2 26.0 22.3 27.5   ANION GAP 9.0 8.7 9.5   BUN 27* 21 17   CREATININE 0.88 1.02 1.06   EGFR 88.0 75.2 71.8   GLUCOSE 101* 160* 140*   CALCIUM 9.2 8.7 9.9   MAGNESIUM  --  2.1  --    PHOSPHORUS 2.0* 2.5  --          Lab 04/25/22  1057 04/24/22  0608 04/23/22  1424   TOTAL PROTEIN  --  6.0 7.3   ALBUMIN 3.70 3.50 4.50   GLOBULIN  --  2.5 2.8   ALT (SGPT)  --  14 17   AST (SGOT)  --  19 24   BILIRUBIN  --  0.5 0.9   ALK PHOS  --  48 68         Lab 04/23/22  1424   PROBNP 861.8   TROPONIN T <0.010                 Brief Urine Lab Results     None        Microbiology Results (last 10 days)     Procedure Component Value - Date/Time    S. Pneumo Ag Urine or CSF - Urine, Urine, Clean Catch [[id number]]  (Normal) Collected: 04/23/22 2227    Lab Status: Final result Specimen: Urine, Clean Catch Updated: 04/24/22 0759     Strep Pneumo Ag Negative    Legionella Antigen, Urine - Urine, Urine, Clean Catch [[id number]]  (Normal) Collected: 04/23/22 2227    Lab Status: Final result Specimen: Urine, Clean Catch Updated: 04/24/22 0759     LEGIONELLA ANTIGEN, URINE Negative    Respiratory Culture - Sputum, Cough [[id number]] Collected: 04/23/22 2119    Lab Status: Preliminary result Specimen: Sputum from Cough Updated: 04/25/22 1137     Respiratory Culture Light  growth (2+) The culture consists of normal respiratory jose. This is a preliminary report; final report to follow.     Gram Stain Many (4+) WBCs seen      Many (4+) Gram positive cocci      Few (2+) Epithelial cells seen    Blood Culture - Blood, Arm, Left [[id number]]  (Normal) Collected: 04/23/22 1809    Lab Status: Preliminary result Specimen: Blood from Arm, Left Updated: 04/24/22 1834     Blood Culture No growth at 24 hours    Blood Culture - Blood, Arm, Right [[id number]]  (Normal) Collected: 04/23/22 1805    Lab Status: Preliminary result Specimen: Blood from Arm, Right Updated: 04/24/22 1834     Blood Culture No growth at 24 hours    Mycoplasma Pneumoniae Antibody, IgM - Blood, Arm, Left [[id number]]  (Abnormal) Collected: 04/23/22 1805    Lab Status: Final result Specimen: Blood from Arm, Left Updated: 04/23/22 1918     Mycoplasma pneumo IgM Positive    Influenza Antigen, Rapid - Swab, Nasopharynx [[id number]]  (Normal) Collected: 04/23/22 1645    Lab Status: Final result Specimen: Swab from Nasopharynx Updated: 04/23/22 1739     Influenza A Ag, EIA Negative     Influenza B Ag, EIA Negative    COVID-19,APTIMA PANTHER(CAT),[facility], NP/OP SWAB IN UTM/VTM/SALINE TRANSPORT MEDIA,24 HR TAT - Swab, Nasal Cavity [[id number]]  (Normal) Collected: 04/23/22 1645    Lab Status: Final result Specimen: Swab from Nasal Cavity Updated: 04/23/22 2058     COVID19 Not Detected    Narrative:      Fact sheet for providers: https://www.fda.gov/media/308631/download     Fact sheet for patients: https://www.fda.gov/media/808454/download    Test performed by RT PCR.          XR Chest 2 View    Result Date: 4/23/2022  Impression:  No active disease, stable chest x-ray.     RILEY BRYANT MD       Electronically Signed and Approved By: RILEY BRYANT MD on 4/23/2022 at 12:37             XR Chest 1 View    Result Date: 4/23/2022  Impression:  No interval change from the earlier exam.       RILEY BRYANT MD       Electronically Signed and  Approved By: RILEY BRYANT MD on 4/23/2022 at 15:47                           Labs Pending at Discharge:  Pending Labs     Order Current Status    Blood Culture - Blood, Arm, Left Preliminary result    Blood Culture - Blood, Arm, Right Preliminary result    Respiratory Culture - Sputum, Cough Preliminary result        Condition at DC: stable for DC    Electronically signed by Jayden Casper MD, 04/25/22, 12:32 PM EDT.

## 2022-04-25 NOTE — PLAN OF CARE
Goal Outcome Evaluation:  Plan of Care Reviewed With: patient, family        Progress: improving  Outcome Evaluation: VSS. DENIES C/O. PT TOLERATING BEING ON ROOM AIR WITHOUT SOA. PRODUCTIVE COUGH WITH CREAMY YELLOW SPUTUM. SLEPT WELL. PT HOPES TO DC HOME TODAY PER DISCUSSION WITH DR GAYTAN LAST NIGHT.

## 2022-04-25 NOTE — OUTREACH NOTE
Prep Survey    Flowsheet Row Responses   [facility] facility patient discharged from? [facility]   Is LACE score < 7 ? Yes   Emergency Room discharge w/ pulse ox? No   Eligibility [facility]   H[facility]   Date of Admission 04/23/22   Date of Discharge 04/25/22   Discharge Disposition Home or Self Care   Discharge diagnosis PNA   Does the patient have one of the following disease processes/diagnoses(primary or secondary)? COPD/Pneumonia   Does the patient have Home health ordered? No   Is there a DME ordered? No   Prep survey completed? Yes          NATY A - Registered Nurse

## 2022-04-25 NOTE — PROGRESS NOTES
[facility]         HOSPITALIST  DISCHARGE SUMMARY    Patient Name: Walker Ordoñez  : 1945  MRN: [id number]    Date of Admission: 2022  Date of Discharge:  2022  Primary Care Physician: Tanvir Garcia DO   Admitting Service:  Hospital Medicine  Consultants: Note    Final Diagnoses:  · Hypoxia  · Mycoplasma pneumonia  · Leukocytosis, likely exacerbated by steroids  · History of PE requiring anticoagulation      Hospital Course     Hospital Course:  Walker Ordoñez is a very pleasant 76-year-old male with history of PMR on prednisone, PE, and hypertension.  He presented to the hospital with 1 day of fever, myalgia, SOA, cough.  He denied any sick contacts.  In ED he was found to be hypoxic requiring 2 L of nasal cannula to maintain oxygen saturations above 90%.  Blood cultures were ordered, he was given nebulized breathing treatments and steroids and admitted to the medicine service.  He was started on antibiotics.  He has done very well and is stable for discharge today on oral antibiotics.  His Mycoplasma ab returned positive.     DISCHARGE Follow Up Recommendations for labs and diagnostics: f/u with PCP 1-2 weeks; recommend f/u CXR 2-4 weeks with PCP to ensure resolution      Day of Discharge     Vital Signs:  Temp:  [97.3 °F (36.3 °C)-98.4 °F (36.9 °C)] 97.8 °F (36.6 °C)  Heart Rate:  [53-71] 55  Resp:  [16-18] 16  BP: (119-145)/(52-68) 145/68  Physical Exam: Pleasant well-appearing male in NAD; lungs clear      Discharge Details        Discharge Medications      New Medications      Instructions Start Date   albuterol sulfate  (90 Base) MCG/ACT inhaler  Commonly known as: PROVENTIL HFA;VENTOLIN HFA;PROAIR HFA   2 puffs, Inhalation, Every 4 Hours PRN, Pneumonia      azithromycin 500 MG tablet  Commonly known as: Zithromax   500 mg, Oral, Daily      cefdinir 300 MG capsule  Commonly known as: OMNICEF   300 mg, Oral, 2 Times Daily         Changes to Medications       Instructions Start Date   predniSONE 1 MG tablet  Commonly known as: DELTASONE  What changed: Another medication with the same name was added. Make sure you understand how and when to take each.   TAKE ONE TABLET BY MOUTH FOUR TIMES A DAY      predniSONE 20 MG tablet  Commonly known as: DELTASONE  What changed: You were already taking a medication with the same name, and this prescription was added. Make sure you understand how and when to take each.   20 mg, Oral, Daily         Continue These Medications      Instructions Start Date   Eliquis 5 MG tablet tablet  Generic drug: apixaban   TAKE ONE TABLET BY MOUTH TWICE A DAY      lisinopril 20 MG tablet  Commonly known as: PRINIVIL,ZESTRIL   TAKE ONE TABLET BY MOUTH DAILY      metoprolol succinate XL 50 MG 24 hr tablet  Commonly known as: TOPROL-XL   50 mg, Oral, Daily      multivitamin tablet tablet  Commonly known as: THERAGRAN   1 tablet, Oral, Daily      sertraline 25 MG tablet  Commonly known as: ZOLOFT   25 mg, Oral, Daily             Allergies   Allergen Reactions   • Codeine GI Intolerance and Nausea Only     Sick at stomach       Discharge Disposition:  Home or Self Care    Diet:  Hospital:  Diet Order   Procedures   • Diet Regular       Discharge Activity:       CODE STATUS:  There are no questions and answers to display.         Future Appointments   Date Time Provider Department Center   5/5/2022  9:45 AM Tanvir Garcia DO [facility]   8/5/2022 10:15 AM Bimal Rivera MD [facility]       [unfilled]    Pertinent  and/or Most Recent Results     PROCEDURES:   None    LAB RESULTS:      Lab 04/25/22  1057 04/24/22  1002 04/24/22  0608 04/23/22  2053 04/23/22  1805 04/23/22  1424   WBC 19.09* 23.41*  --   --   --  16.54*   HEMOGLOBIN 13.1 13.2  --   --   --  15.3   HEMATOCRIT 38.8 39.0  --   --   --  46.8   PLATELETS 245 232  --   --   --  270   NEUTROS ABS  --  21.96*  --   --   --  13.90*   IMMATURE GRANS (ABS)  --  0.25*  --   --    --  0.12*   LYMPHS ABS  --  0.59*  --   --   --  0.99   MONOS ABS  --  0.59  --   --   --  1.40*   EOS ABS  --  0.00  --   --   --  0.07   .5* 99.5*  --   --   --  100.9*   CRP  --   --   --   --  4.59*  --    PROCALCITONIN  --   --  0.95*  --   --   --    LACTATE  --   --  1.3 3.0* 2.2*  --          Lab 04/25/22  1057 04/24/22  0608 04/23/22  1424   SODIUM 141 138 142   POTASSIUM 4.6 4.5 4.7   CHLORIDE 106 107 105   CO2 26.0 22.3 27.5   ANION GAP 9.0 8.7 9.5   BUN 27* 21 17   CREATININE 0.88 1.02 1.06   EGFR 88.0 75.2 71.8   GLUCOSE 101* 160* 140*   CALCIUM 9.2 8.7 9.9   MAGNESIUM  --  2.1  --    PHOSPHORUS 2.0* 2.5  --          Lab 04/25/22  1057 04/24/22  0608 04/23/22  1424   TOTAL PROTEIN  --  6.0 7.3   ALBUMIN 3.70 3.50 4.50   GLOBULIN  --  2.5 2.8   ALT (SGPT)  --  14 17   AST (SGOT)  --  19 24   BILIRUBIN  --  0.5 0.9   ALK PHOS  --  48 68         Lab 04/23/22  1424   PROBNP 861.8   TROPONIN T <0.010                 Brief Urine Lab Results     None        Microbiology Results (last 10 days)     Procedure Component Value - Date/Time    S. Pneumo Ag Urine or CSF - Urine, Urine, Clean Catch [[id number]]  (Normal) Collected: 04/23/22 2227    Lab Status: Final result Specimen: Urine, Clean Catch Updated: 04/24/22 0759     Strep Pneumo Ag Negative    Legionella Antigen, Urine - Urine, Urine, Clean Catch [[id number]]  (Normal) Collected: 04/23/22 2227    Lab Status: Final result Specimen: Urine, Clean Catch Updated: 04/24/22 0759     LEGIONELLA ANTIGEN, URINE Negative    Respiratory Culture - Sputum, Cough [[id number]] Collected: 04/23/22 2119    Lab Status: Preliminary result Specimen: Sputum from Cough Updated: 04/25/22 1137     Respiratory Culture Light growth (2+) The culture consists of normal respiratory jose. This is a preliminary report; final report to follow.     Gram Stain Many (4+) WBCs seen      Many (4+) Gram positive cocci      Few (2+) Epithelial cells seen    Blood Culture - Blood, Arm, Left  [[id number]]  (Normal) Collected: 04/23/22 1809    Lab Status: Preliminary result Specimen: Blood from Arm, Left Updated: 04/24/22 1834     Blood Culture No growth at 24 hours    Blood Culture - Blood, Arm, Right [[id number]]  (Normal) Collected: 04/23/22 1805    Lab Status: Preliminary result Specimen: Blood from Arm, Right Updated: 04/24/22 1834     Blood Culture No growth at 24 hours    Mycoplasma Pneumoniae Antibody, IgM - Blood, Arm, Left [[id number]]  (Abnormal) Collected: 04/23/22 1805    Lab Status: Final result Specimen: Blood from Arm, Left Updated: 04/23/22 1918     Mycoplasma pneumo IgM Positive    Influenza Antigen, Rapid - Swab, Nasopharynx [[id number]]  (Normal) Collected: 04/23/22 1645    Lab Status: Final result Specimen: Swab from Nasopharynx Updated: 04/23/22 1739     Influenza A Ag, EIA Negative     Influenza B Ag, EIA Negative    COVID-19,APTIMA PANTHER(CAT),[facility], NP/OP SWAB IN UTM/VTM/SALINE TRANSPORT MEDIA,24 HR TAT - Swab, Nasal Cavity [[id number]]  (Normal) Collected: 04/23/22 1645    Lab Status: Final result Specimen: Swab from Nasal Cavity Updated: 04/23/22 2058     COVID19 Not Detected    Narrative:      Fact sheet for providers: https://www.fda.gov/media/022558/download     Fact sheet for patients: https://www.fda.gov/media/725131/download    Test performed by RT PCR.          XR Chest 2 View    Result Date: 4/23/2022  Impression:  No active disease, stable chest x-ray.     RILEY BRYANT MD       Electronically Signed and Approved By: RILEY BRYANT MD on 4/23/2022 at 12:37             XR Chest 1 View    Result Date: 4/23/2022  Impression:  No interval change from the earlier exam.       RILEY BRYANT MD       Electronically Signed and Approved By: RILEY BRYANT MD on 4/23/2022 at 15:47                           Labs Pending at Discharge:  Pending Labs     Order Current Status    Blood Culture - Blood, Arm, Left Preliminary result    Blood Culture - Blood, Arm, Right Preliminary result     Respiratory Culture - Sputum, Cough Preliminary result       Condition at DC: stable for DC      Electronically signed by Jayden Casper MD, 04/25/22, 12:27 PM EDT.

## 2022-04-26 ENCOUNTER — TRANSITIONAL CARE MANAGEMENT TELEPHONE ENCOUNTER (OUTPATIENT)
Dept: CALL CENTER | Facility: HOSPITAL | Age: 77
End: 2022-04-26

## 2022-04-26 LAB
BACTERIA SPEC RESP CULT: ABNORMAL
BACTERIA SPEC RESP CULT: ABNORMAL
GRAM STN SPEC: ABNORMAL

## 2022-04-26 NOTE — OUTREACH NOTE
Call Center TCM Note    Flowsheet Row Responses   [facility] patient discharged from? [facility]   Does the patient have one of the following disease processes/diagnoses(primary or secondary)? COPD/Pneumonia   Was the primary reason for admission: Pneumonia   TCM attempt successful? Yes   Call start time 1003   Call end time 1005   Discharge diagnosis PNA   Meds reviewed with patient/caregiver? Yes   Is the patient having any side effects they believe may be caused by any medication additions or changes? No   Does the patient have all medications ordered at discharge? Yes   Is the patient taking all medications as directed (includes completed medication regime)? Yes   Does the patient have a primary care provider?  Yes   Does the patient have an appointment with their PCP or specialist within 7 days of discharge? Yes   Comments regarding PCP HOSP DC FU appt 5/2/22 @ 3:45 pm.    Has the patient kept scheduled appointments due by today? N/A   Has home health visited the patient within 72 hours of discharge? N/A   Pulse Ox monitoring Intermittent   Pulse Ox device source Patient   O2 Sat comments 98%   O2 Sat: education provided Sat levels, Monitoring frequency, When to seek care   Psychosocial issues? No   Did the patient receive a copy of their discharge instructions? Yes   Nursing interventions Reviewed instructions with patient   What is the patient's perception of their health status since discharge? Improving   Nursing Interventions Nurse provided patient education   Are the patient's immunizations up to date?  Yes   If the patient is a current smoker, are they able to teach back resources for cessation? Not a smoker   Is the patient/caregiver able to teach back the hierarchy of who to call/visit for symptoms/problems? PCP, Specialist, Home health nurse, Urgent Care, ED, 911 Yes   Is the patient/caregiver able to teach back signs and symptoms of worsening condition: Fever/chills, Shortness of breath, Chest pain    Is the patient/caregiver able to teach back importance of completing antibiotic course of treatment? Yes   TCM call completed? Yes   Wrap up additional comments Pt reports he is doing much better.           Taryn Carranza RN    4/26/2022, 10:07 EDT

## 2022-04-28 LAB
BACTERIA SPEC AEROBE CULT: NORMAL
BACTERIA SPEC AEROBE CULT: NORMAL

## 2022-05-02 ENCOUNTER — OFFICE VISIT (OUTPATIENT)
Dept: FAMILY MEDICINE CLINIC | Facility: CLINIC | Age: 77
End: 2022-05-02

## 2022-05-02 VITALS
BODY MASS INDEX: 23.58 KG/M2 | SYSTOLIC BLOOD PRESSURE: 153 MMHG | HEIGHT: 76 IN | TEMPERATURE: 97.4 F | WEIGHT: 193.6 LBS | HEART RATE: 61 BPM | DIASTOLIC BLOOD PRESSURE: 65 MMHG | OXYGEN SATURATION: 98 %

## 2022-05-02 DIAGNOSIS — Z09 HOSPITAL DISCHARGE FOLLOW-UP: ICD-10-CM

## 2022-05-02 DIAGNOSIS — J15.7 PNEUMONIA DUE TO MYCOPLASMA PNEUMONIAE, UNSPECIFIED LATERALITY, UNSPECIFIED PART OF LUNG: Primary | ICD-10-CM

## 2022-05-02 LAB
BASOPHILS # BLD AUTO: 0.07 10*3/MM3 (ref 0–0.2)
BASOPHILS NFR BLD AUTO: 0.6 % (ref 0–1.5)
DEPRECATED RDW RBC AUTO: 46.5 FL (ref 37–54)
EOSINOPHIL # BLD AUTO: 0.32 10*3/MM3 (ref 0–0.4)
EOSINOPHIL NFR BLD AUTO: 2.8 % (ref 0.3–6.2)
ERYTHROCYTE [DISTWIDTH] IN BLOOD BY AUTOMATED COUNT: 12.9 % (ref 12.3–15.4)
HCT VFR BLD AUTO: 45.6 % (ref 37.5–51)
HGB BLD-MCNC: 15.1 G/DL (ref 13–17.7)
IMM GRANULOCYTES # BLD AUTO: 0.21 10*3/MM3 (ref 0–0.05)
IMM GRANULOCYTES NFR BLD AUTO: 1.8 % (ref 0–0.5)
LYMPHOCYTES # BLD AUTO: 1.53 10*3/MM3 (ref 0.7–3.1)
LYMPHOCYTES NFR BLD AUTO: 13.4 % (ref 19.6–45.3)
MCH RBC QN AUTO: 32.7 PG (ref 26.6–33)
MCHC RBC AUTO-ENTMCNC: 33.1 G/DL (ref 31.5–35.7)
MCV RBC AUTO: 98.7 FL (ref 79–97)
MONOCYTES # BLD AUTO: 0.74 10*3/MM3 (ref 0.1–0.9)
MONOCYTES NFR BLD AUTO: 6.5 % (ref 5–12)
NEUTROPHILS NFR BLD AUTO: 74.9 % (ref 42.7–76)
NEUTROPHILS NFR BLD AUTO: 8.54 10*3/MM3 (ref 1.7–7)
NRBC BLD AUTO-RTO: 0 /100 WBC (ref 0–0.2)
PLATELET # BLD AUTO: 349 10*3/MM3 (ref 140–450)
PMV BLD AUTO: 10 FL (ref 6–12)
RBC # BLD AUTO: 4.62 10*6/MM3 (ref 4.14–5.8)
WBC NRBC COR # BLD: 11.41 10*3/MM3 (ref 3.4–10.8)

## 2022-05-02 PROCEDURE — 36415 COLL VENOUS BLD VENIPUNCTURE: CPT | Performed by: FAMILY MEDICINE

## 2022-05-02 PROCEDURE — 85025 COMPLETE CBC W/AUTO DIFF WBC: CPT | Performed by: FAMILY MEDICINE

## 2022-05-02 PROCEDURE — 99495 TRANSJ CARE MGMT MOD F2F 14D: CPT | Performed by: FAMILY MEDICINE

## 2022-05-02 PROCEDURE — 1111F DSCHRG MED/CURRENT MED MERGE: CPT | Performed by: FAMILY MEDICINE

## 2022-05-02 NOTE — PROGRESS NOTES
Chief Complaint   Patient presents with   • Hospital Follow Up Visit     Patient was admitted on 4/23 with Pneumonia and discharged on 4/25.        Subjective     Walker Ordoñez  has a past medical history of Anxiety, Arthritis, Blood clotting disorder ([facility]) (04/27/2017), Broken bones, Depression, Hemorrhoid, Hypertension, Nephrolithiasis, PMR (polymyalgia rheumatica) ([facility]), and Pulmonary embolism ([facility]).    Hospital follow-up- he is admitted at [facility] 4/23 through 4/25/2022.  His diagnosis was mycoplasma pneumonia.  He was discharged on Omnicef and Zithromax.  Since the time of discharge she has finished all of his antibiotics.  He is doing better.  Initially he was feeling short of breath rather easily but that has since improved dramatically.  Since his time of discharge the discharge nurse had called and checked on him to see how he was doing and if he had any additional needs or concerns.      PHQ-2 Depression Screening  Little interest or pleasure in doing things?     Feeling down, depressed, or hopeless?     PHQ-2 Total Score     PHQ-9 Depression Screening  Little interest or pleasure in doing things?     Feeling down, depressed, or hopeless?     Trouble falling or staying asleep, or sleeping too much?     Feeling tired or having little energy?     Poor appetite or overeating?     Feeling bad about yourself - or that you are a failure or have let yourself or your family down?     Trouble concentrating on things, such as reading the newspaper or watching television?     Moving or speaking so slowly that other people could have noticed? Or the opposite - being so fidgety or restless that you have been moving around a lot more than usual?     Thoughts that you would be better off dead, or of hurting yourself in some way?     PHQ-9 Total Score     If you checked off any problems, how difficult have these problems made it for you to do your work, take care of things at home, or get along with other  people?       Allergies   Allergen Reactions   • Codeine GI Intolerance and Nausea Only     Sick at stomach       Prior to Admission medications    Medication Sig Start Date End Date Taking? Authorizing Provider   albuterol sulfate  (90 Base) MCG/ACT inhaler Inhale 2 puffs Every 4 (Four) Hours As Needed for Wheezing for up to 30 days. Pneumonia 22 Yes Jayden Casper MD   Eliquis 5 MG tablet tablet TAKE ONE TABLET BY MOUTH TWICE A DAY 21  Yes Tanvir Garcia DO   lisinopril (PRINIVIL,ZESTRIL) 20 MG tablet TAKE ONE TABLET BY MOUTH DAILY 22  Yes Tanvir Garcia DO   metoprolol succinate XL (TOPROL-XL) 50 MG 24 hr tablet Take 50 mg by mouth Daily.   Yes ProviderEva MD   Multiple Vitamin (MULTI-VITAMIN DAILY PO) Take 1 tablet by mouth Daily.   Yes ProviderEva MD   predniSONE (DELTASONE) 1 MG tablet TAKE ONE TABLET BY MOUTH FOUR TIMES A DAY 3/1/22  Yes Tanvir Garcia DO   sertraline (ZOLOFT) 25 MG tablet Take 1 tablet by mouth Daily. 21  Yes Tanvir Garcia DO        Patient Active Problem List   Diagnosis   • Long term current use of systemic steroids   • Hypertension, essential   • PMR (polymyalgia rheumatica) (HCC)   • Depression   • Pulmonary embolism (HCC)   • Pneumonia   • Hospital discharge follow-up        Past Surgical History:   Procedure Laterality Date   • ARTHROPLASTY      Artificial Joints/Limbs   • BACK SURGERY      Lumbar   • COLONOSCOPY  2019   • ESOPHAGUS SURGERY      stretching   • FEMUR FRACTURE SURGERY Left    • HERNIA REPAIR     • HIP FRACTURE SURGERY Left    • KNEE SURGERY Right    • NECK SURGERY     • SHOULDER SURGERY Left     Left Tear       Social History     Socioeconomic History   • Marital status:    Tobacco Use   • Smoking status: Former Smoker     Packs/day: 1.00     Types: Cigarettes     Start date:      Quit date:      Years since quittin.3   • Smokeless tobacco:  "Never Used   • Tobacco comment: Quit 46 years ago   Vaping Use   • Vaping Use: Never used   Substance and Sexual Activity   • Alcohol use: Never   • Drug use: Never   • Sexual activity: Defer       Family History   Problem Relation Age of Onset   • Arthritis Mother    • Leukemia Mother    • Heart disease Brother        Family history, surgical history, past medical history, Allergies and med's reviewed with patient today and updated in [facility] EMR.     ROS:  Review of Systems   Constitutional: Negative for appetite change, chills, fatigue and fever.   HENT: Positive for rhinorrhea. Negative for congestion and postnasal drip.    Eyes: Negative for blurred vision and visual disturbance.   Respiratory: Positive for cough. Negative for chest tightness, shortness of breath and wheezing.    Cardiovascular: Negative for chest pain and palpitations.   Gastrointestinal: Negative for diarrhea.   Allergic/Immunologic: Negative for environmental allergies.   Neurological: Negative for headache.       OBJECTIVE:  Vitals:    05/02/22 1540   BP: 153/65   Pulse: 61   Temp: 97.4 °F (36.3 °C)   SpO2: 98%   Weight: 87.8 kg (193 lb 9.6 oz)   Height: 193 cm (76\")     No exam data present   Body mass index is 23.57 kg/m².  No LMP for male patient.    Physical Exam  Vitals and nursing note reviewed.   Constitutional:       General: He is not in acute distress.     Appearance: Normal appearance. He is normal weight.   HENT:      Head: Normocephalic.      Right Ear: Tympanic membrane, ear canal and external ear normal.      Left Ear: Tympanic membrane, ear canal and external ear normal.      Nose: Nose normal.      Mouth/Throat:      Mouth: Mucous membranes are moist.      Pharynx: Oropharynx is clear.   Eyes:      General: No scleral icterus.     Conjunctiva/sclera: Conjunctivae normal.      Pupils: Pupils are equal, round, and reactive to light.   Cardiovascular:      Rate and Rhythm: Normal rate and regular rhythm.      Pulses: Normal " pulses.      Heart sounds: Murmur heard.    Crescendo decrescendo systolic murmur is present with a grade of 1/6.  Pulmonary:      Effort: Pulmonary effort is normal.      Breath sounds: Normal breath sounds. No wheezing, rhonchi or rales.   Musculoskeletal:      Cervical back: Neck supple. No rigidity or tenderness.   Lymphadenopathy:      Cervical: No cervical adenopathy.   Skin:     General: Skin is warm and dry.      Coloration: Skin is not jaundiced.      Findings: No rash.   Neurological:      General: No focal deficit present.      Mental Status: He is alert and oriented to person, place, and time.   Psychiatric:         Mood and Affect: Mood normal.         Thought Content: Thought content normal.         Judgment: Judgment normal.         Procedures    Admission on 04/23/2022, Discharged on 04/25/2022   Component Date Value Ref Range Status   • QT Interval 04/23/2022 436  ms Final   • Glucose 04/23/2022 140 (A) 65 - 99 mg/dL Final   • BUN 04/23/2022 17  8 - 23 mg/dL Final   • Creatinine 04/23/2022 1.06  0.76 - 1.27 mg/dL Final   • Sodium 04/23/2022 142  136 - 145 mmol/L Final   • Potassium 04/23/2022 4.7  3.5 - 5.2 mmol/L Final   • Chloride 04/23/2022 105  98 - 107 mmol/L Final   • CO2 04/23/2022 27.5  22.0 - 29.0 mmol/L Final   • Calcium 04/23/2022 9.9  8.6 - 10.5 mg/dL Final   • Total Protein 04/23/2022 7.3  6.0 - 8.5 g/dL Final   • Albumin 04/23/2022 4.50  3.50 - 5.20 g/dL Final   • ALT (SGPT) 04/23/2022 17  1 - 41 U/L Final   • AST (SGOT) 04/23/2022 24  1 - 40 U/L Final   • Alkaline Phosphatase 04/23/2022 68  39 - 117 U/L Final   • Total Bilirubin 04/23/2022 0.9  0.0 - 1.2 mg/dL Final   • Globulin 04/23/2022 2.8  gm/dL Final   • A/G Ratio 04/23/2022 1.6  g/dL Final   • BUN/Creatinine Ratio 04/23/2022 16.0  7.0 - 25.0 Final   • Anion Gap 04/23/2022 9.5  5.0 - 15.0 mmol/L Final   • eGFR 04/23/2022 71.8  >60.0 mL/min/1.73 Final    National Kidney Foundation and American Society of Nephrology (ASN) Task  Force recommended calculation based on the Chronic Kidney Disease Epidemiology Collaboration (CKD-EPI) equation refit without adjustment for race.   • proBNP 04/23/2022 861.8  0.0 - 1,800.0 pg/mL Final   • Troponin T 04/23/2022 <0.010  0.000 - 0.030 ng/mL Final   • Extra Tube 04/23/2022 Hold for add-ons.   Final    Auto resulted.   • Extra Tube 04/23/2022 hold for add-on   Final    Auto resulted   • Extra Tube 04/23/2022 Hold for add-ons.   Final    Auto resulted.   • Extra Tube 04/23/2022 hold for add-on   Final    Auto resulted   • WBC 04/23/2022 16.54 (A) 3.40 - 10.80 10*3/mm3 Final   • RBC 04/23/2022 4.64  4.14 - 5.80 10*6/mm3 Final   • Hemoglobin 04/23/2022 15.3  13.0 - 17.7 g/dL Final   • Hematocrit 04/23/2022 46.8  37.5 - 51.0 % Final   • MCV 04/23/2022 100.9 (A) 79.0 - 97.0 fL Final   • MCH 04/23/2022 33.0  26.6 - 33.0 pg Final   • MCHC 04/23/2022 32.7  31.5 - 35.7 g/dL Final   • RDW 04/23/2022 13.5  12.3 - 15.4 % Final   • RDW-SD 04/23/2022 50.6  37.0 - 54.0 fl Final   • MPV 04/23/2022 9.7  6.0 - 12.0 fL Final   • Platelets 04/23/2022 270  140 - 450 10*3/mm3 Final   • Neutrophil % 04/23/2022 84.0 (A) 42.7 - 76.0 % Final   • Lymphocyte % 04/23/2022 6.0 (A) 19.6 - 45.3 % Final   • Monocyte % 04/23/2022 8.5  5.0 - 12.0 % Final   • Eosinophil % 04/23/2022 0.4  0.3 - 6.2 % Final   • Basophil % 04/23/2022 0.4  0.0 - 1.5 % Final   • Immature Grans % 04/23/2022 0.7 (A) 0.0 - 0.5 % Final   • Neutrophils, Absolute 04/23/2022 13.90 (A) 1.70 - 7.00 10*3/mm3 Final   • Lymphocytes, Absolute 04/23/2022 0.99  0.70 - 3.10 10*3/mm3 Final   • Monocytes, Absolute 04/23/2022 1.40 (A) 0.10 - 0.90 10*3/mm3 Final   • Eosinophils, Absolute 04/23/2022 0.07  0.00 - 0.40 10*3/mm3 Final   • Basophils, Absolute 04/23/2022 0.06  0.00 - 0.20 10*3/mm3 Final   • Immature Grans, Absolute 04/23/2022 0.12 (A) 0.00 - 0.05 10*3/mm3 Final   • nRBC 04/23/2022 0.0  0.0 - 0.2 /100 WBC Final   • Influenza A Ag, EIA 04/23/2022 Negative  Negative Final    • Influenza B Ag, EIA 04/23/2022 Negative  Negative Final   • COVID19 04/23/2022 Not Detected  Not Detected - Ref. Range Final   • Lactate 04/23/2022 2.2 (A) 0.5 - 2.0 mmol/L Final   • Blood Culture 04/23/2022 No growth at 5 days   Final   • Blood Culture 04/23/2022 No growth at 5 days   Final   • C-Reactive Protein 04/23/2022 4.59 (A) 0.00 - 0.50 mg/dL Final   • Strep Pneumo Ag 04/23/2022 Negative  Negative Final   • Mycoplasma pneumo IgM 04/23/2022 Positive (A) Negative Final   • LEGIONELLA ANTIGEN, URINE 04/23/2022 Negative  Negative Final   • Respiratory Culture 04/23/2022 Moderate growth (3+) Moraxella catarrhalis (A)  Final    This isolate is beta-lactamase POSITIVE which confers resistance to ampicillin but resistance to third generation cephalosporins or beta-lactam/beta-lactamase inhibitor combinations is rare.     • Respiratory Culture 04/23/2022 Scant growth (1+) Normal respiratory jose. No S. aureus or Pseudomonas aeruginosa detected. Final report.   Final   • Gram Stain 04/23/2022 Many (4+) WBCs seen   Final   • Gram Stain 04/23/2022 Many (4+) Gram positive cocci   Final   • Gram Stain 04/23/2022 Few (2+) Epithelial cells seen   Final   • Lactate 04/23/2022 3.0 (A) 0.5 - 2.0 mmol/L Final   • WBC 04/24/2022 23.41 (A) 3.40 - 10.80 10*3/mm3 Final   • RBC 04/24/2022 3.92 (A) 4.14 - 5.80 10*6/mm3 Final   • Hemoglobin 04/24/2022 13.2  13.0 - 17.7 g/dL Final   • Hematocrit 04/24/2022 39.0  37.5 - 51.0 % Final   • MCV 04/24/2022 99.5 (A) 79.0 - 97.0 fL Final   • MCH 04/24/2022 33.7 (A) 26.6 - 33.0 pg Final   • MCHC 04/24/2022 33.8  31.5 - 35.7 g/dL Final   • RDW 04/24/2022 13.5  12.3 - 15.4 % Final   • RDW-SD 04/24/2022 48.9  37.0 - 54.0 fl Final   • MPV 04/24/2022 9.8  6.0 - 12.0 fL Final   • Platelets 04/24/2022 232  140 - 450 10*3/mm3 Final   • Neutrophil % 04/24/2022 93.8 (A) 42.7 - 76.0 % Final   • Lymphocyte % 04/24/2022 2.5 (A) 19.6 - 45.3 % Final   • Monocyte % 04/24/2022 2.5 (A) 5.0 - 12.0 % Final    • Eosinophil % 04/24/2022 0.0 (A) 0.3 - 6.2 % Final   • Basophil % 04/24/2022 0.1  0.0 - 1.5 % Final   • Immature Grans % 04/24/2022 1.1 (A) 0.0 - 0.5 % Final   • Neutrophils, Absolute 04/24/2022 21.96 (A) 1.70 - 7.00 10*3/mm3 Final   • Lymphocytes, Absolute 04/24/2022 0.59 (A) 0.70 - 3.10 10*3/mm3 Final   • Monocytes, Absolute 04/24/2022 0.59  0.10 - 0.90 10*3/mm3 Final   • Eosinophils, Absolute 04/24/2022 0.00  0.00 - 0.40 10*3/mm3 Final   • Basophils, Absolute 04/24/2022 0.02  0.00 - 0.20 10*3/mm3 Final   • Immature Grans, Absolute 04/24/2022 0.25 (A) 0.00 - 0.05 10*3/mm3 Final   • nRBC 04/24/2022 0.0  0.0 - 0.2 /100 WBC Final   • Glucose 04/24/2022 160 (A) 65 - 99 mg/dL Final   • BUN 04/24/2022 21  8 - 23 mg/dL Final   • Creatinine 04/24/2022 1.02  0.76 - 1.27 mg/dL Final   • Sodium 04/24/2022 138  136 - 145 mmol/L Final   • Potassium 04/24/2022 4.5  3.5 - 5.2 mmol/L Final   • Chloride 04/24/2022 107  98 - 107 mmol/L Final   • CO2 04/24/2022 22.3  22.0 - 29.0 mmol/L Final   • Calcium 04/24/2022 8.7  8.6 - 10.5 mg/dL Final   • Total Protein 04/24/2022 6.0  6.0 - 8.5 g/dL Final   • Albumin 04/24/2022 3.50  3.50 - 5.20 g/dL Final   • ALT (SGPT) 04/24/2022 14  1 - 41 U/L Final   • AST (SGOT) 04/24/2022 19  1 - 40 U/L Final   • Alkaline Phosphatase 04/24/2022 48  39 - 117 U/L Final   • Total Bilirubin 04/24/2022 0.5  0.0 - 1.2 mg/dL Final   • Globulin 04/24/2022 2.5  gm/dL Final   • A/G Ratio 04/24/2022 1.4  g/dL Final   • BUN/Creatinine Ratio 04/24/2022 20.6  7.0 - 25.0 Final   • Anion Gap 04/24/2022 8.7  5.0 - 15.0 mmol/L Final   • eGFR 04/24/2022 75.2  >60.0 mL/min/1.73 Final    National Kidney Foundation and American Society of Nephrology (ASN) Task Force recommended calculation based on the Chronic Kidney Disease Epidemiology Collaboration (CKD-EPI) equation refit without adjustment for race.   • Magnesium 04/24/2022 2.1  1.6 - 2.4 mg/dL Final   • Phosphorus 04/24/2022 2.5  2.5 - 4.5 mg/dL Final   • Lactate  04/24/2022 1.3  0.5 - 2.0 mmol/L Final   • Procalcitonin 04/24/2022 0.95 (A) 0.00 - 0.25 ng/mL Final   • WBC 04/25/2022 19.09 (A) 3.40 - 10.80 10*3/mm3 Final   • RBC 04/25/2022 3.86 (A) 4.14 - 5.80 10*6/mm3 Final   • Hemoglobin 04/25/2022 13.1  13.0 - 17.7 g/dL Final   • Hematocrit 04/25/2022 38.8  37.5 - 51.0 % Final   • MCV 04/25/2022 100.5 (A) 79.0 - 97.0 fL Final   • MCH 04/25/2022 33.9 (A) 26.6 - 33.0 pg Final   • MCHC 04/25/2022 33.8  31.5 - 35.7 g/dL Final   • RDW 04/25/2022 13.8  12.3 - 15.4 % Final   • RDW-SD 04/25/2022 50.5  37.0 - 54.0 fl Final   • MPV 04/25/2022 9.6  6.0 - 12.0 fL Final   • Platelets 04/25/2022 245  140 - 450 10*3/mm3 Final   • Glucose 04/25/2022 101 (A) 65 - 99 mg/dL Final   • BUN 04/25/2022 27 (A) 8 - 23 mg/dL Final   • Creatinine 04/25/2022 0.88  0.76 - 1.27 mg/dL Final   • Sodium 04/25/2022 141  136 - 145 mmol/L Final   • Potassium 04/25/2022 4.6  3.5 - 5.2 mmol/L Final   • Chloride 04/25/2022 106  98 - 107 mmol/L Final   • CO2 04/25/2022 26.0  22.0 - 29.0 mmol/L Final   • Calcium 04/25/2022 9.2  8.6 - 10.5 mg/dL Final   • Albumin 04/25/2022 3.70  3.50 - 5.20 g/dL Final   • Phosphorus 04/25/2022 2.0 (A) 2.5 - 4.5 mg/dL Final   • Anion Gap 04/25/2022 9.0  5.0 - 15.0 mmol/L Final   • BUN/Creatinine Ratio 04/25/2022 30.7 (A) 7.0 - 25.0 Final   • eGFR 04/25/2022 88.0  >60.0 mL/min/1.73 Final    National Kidney Foundation and American Society of Nephrology (ASN) Task Force recommended calculation based on the Chronic Kidney Disease Epidemiology Collaboration (CKD-EPI) equation refit without adjustment for race.   Admission on 04/23/2022, Discharged on 04/23/2022   Component Date Value Ref Range Status   • Rapid Influenza A Ag 04/23/2022 Negative  Negative Final   • Rapid Influenza B Ag 04/23/2022 Negative  Negative Final   • Internal Control 04/23/2022 Passed  Passed Final   • Lot Number 04/23/2022 [id number]   Final   • Expiration Date 04/23/2022 53,023   Final   • SARS Antigen 04/23/2022  Not Detected  Not Detected, Presumptive Negative Final   • Internal Control 04/23/2022 Passed  Passed Final   • Lot Number 04/23/2022 [id number]   Final   • Expiration Date 04/23/2022 112,022   Final       ASSESSMENT/ PLAN:    Diagnoses and all orders for this visit:    1. Pneumonia due to Mycoplasma pneumoniae, unspecified laterality, unspecified part of lung (Primary)  Assessment & Plan:  He had mycoplasma pneumonia.  He has finished all of his antibiotics and feeling much better.  He can repeat his CBC today and wait another 3 to 4 weeks before repeating his chest x-ray.    Orders:  -     CBC & Differential  -     XR Chest PA & Lateral; Future    2. Hospital discharge follow-up  Assessment & Plan:  He has done well since the time of his hospital follow-up.  He is continue to gain strength and endurance since then.        Orders Placed Today:     No orders of the defined types were placed in this encounter.       Management Plan:     An After Visit Summary was printed and given to the patient at discharge.    Follow-up: Return for Next scheduled follow up.    Tanvir Garcia DO 5/2/2022 16:26 EDT  This note was electronically signed.

## 2022-05-02 NOTE — ASSESSMENT & PLAN NOTE
He has done well since the time of his hospital follow-up.  He is continue to gain strength and endurance since then.

## 2022-05-02 NOTE — ASSESSMENT & PLAN NOTE
He had mycoplasma pneumonia.  He has finished all of his antibiotics and feeling much better.  He can repeat his CBC today and wait another 3 to 4 weeks before repeating his chest x-ray.

## 2022-05-23 ENCOUNTER — HOSPITAL ENCOUNTER (OUTPATIENT)
Dept: GENERAL RADIOLOGY | Facility: HOSPITAL | Age: 77
Discharge: HOME | End: 2022-05-23
Admitting: FAMILY MEDICINE

## 2022-05-23 DIAGNOSIS — J15.7 PNEUMONIA DUE TO MYCOPLASMA PNEUMONIAE, UNSPECIFIED LATERALITY, UNSPECIFIED PART OF LUNG: ICD-10-CM

## 2022-05-23 PROCEDURE — 71046 X-RAY EXAM CHEST 2 VIEWS: CPT

## 2022-05-31 RX ORDER — PREDNISONE 1 MG/1
TABLET ORAL
Qty: 360 TABLET | Refills: 1 | Status: SHIPPED | OUTPATIENT
Start: 2022-05-31 | End: 2023-01-16

## 2022-05-31 RX ORDER — SERTRALINE HYDROCHLORIDE 25 MG/1
TABLET, FILM COATED ORAL
Qty: 90 TABLET | Refills: 1 | Status: SHIPPED | OUTPATIENT
Start: 2022-05-31 | End: 2022-11-21

## 2022-05-31 RX ORDER — METOPROLOL SUCCINATE 50 MG/1
TABLET, EXTENDED RELEASE ORAL
Qty: 90 TABLET | Refills: 1 | Status: SHIPPED | OUTPATIENT
Start: 2022-05-31 | End: 2022-11-21

## 2022-06-03 ENCOUNTER — HOSPITAL ENCOUNTER (OUTPATIENT)
Dept: GENERAL RADIOLOGY | Facility: HOSPITAL | Age: 77
Discharge: HOME | End: 2022-06-03
Admitting: FAMILY MEDICINE

## 2022-06-03 ENCOUNTER — OFFICE VISIT (OUTPATIENT)
Dept: FAMILY MEDICINE CLINIC | Facility: CLINIC | Age: 77
End: 2022-06-03

## 2022-06-03 VITALS
WEIGHT: 194 LBS | SYSTOLIC BLOOD PRESSURE: 158 MMHG | OXYGEN SATURATION: 96 % | HEIGHT: 76 IN | DIASTOLIC BLOOD PRESSURE: 61 MMHG | HEART RATE: 70 BPM | TEMPERATURE: 97.9 F | BODY MASS INDEX: 23.62 KG/M2

## 2022-06-03 DIAGNOSIS — I10 HYPERTENSION, ESSENTIAL: Primary | ICD-10-CM

## 2022-06-03 DIAGNOSIS — R05.9 COUGH: ICD-10-CM

## 2022-06-03 DIAGNOSIS — I10 HYPERTENSION, ESSENTIAL: ICD-10-CM

## 2022-06-03 PROBLEM — D68.9 COAGULATION DISORDER (HCC): Status: ACTIVE | Noted: 2022-06-03

## 2022-06-03 PROBLEM — M19.90 ARTHRITIS: Status: ACTIVE | Noted: 2022-06-03

## 2022-06-03 PROBLEM — K64.9 HEMORRHOID: Status: ACTIVE | Noted: 2022-06-03

## 2022-06-03 PROBLEM — T14.8XXA BROKEN BONES: Status: ACTIVE | Noted: 2022-06-03

## 2022-06-03 PROBLEM — F41.9 ANXIETY: Status: ACTIVE | Noted: 2022-06-03

## 2022-06-03 PROBLEM — N20.0 KIDNEY STONE: Status: ACTIVE | Noted: 2022-06-03

## 2022-06-03 PROBLEM — N20.0 NEPHROLITHIASIS: Status: ACTIVE | Noted: 2022-06-03

## 2022-06-03 PROCEDURE — 99213 OFFICE O/P EST LOW 20 MIN: CPT | Performed by: FAMILY MEDICINE

## 2022-06-03 PROCEDURE — 71046 X-RAY EXAM CHEST 2 VIEWS: CPT

## 2022-06-03 NOTE — ASSESSMENT & PLAN NOTE
His blood pressure reading here is mildly elevated, but his home blood pressure readings have consistently been very good.  We will make no changes in his current meds.

## 2022-06-03 NOTE — PROGRESS NOTES
Chief Complaint   Patient presents with   • Follow-up   • Hypertension        Subjective     Walker Ordoñez  has a past medical history of Anxiety, Arthritis, Blood clotting disorder ([facility]) (04/27/2017), Broken bones, Depression, Hemorrhoid, Nephrolithiasis, PMR (polymyalgia rheumatica) ([facility]), and Pulmonary embolism ([facility]).    Hypertension- he has been checking his blood pressure at home.  He states is consistently been in the 130s over 60s.  He is taking his medication on a daily basis.      PHQ-2 Depression Screening  Little interest or pleasure in doing things?     Feeling down, depressed, or hopeless?     PHQ-2 Total Score     PHQ-9 Depression Screening  Little interest or pleasure in doing things?     Feeling down, depressed, or hopeless?     Trouble falling or staying asleep, or sleeping too much?     Feeling tired or having little energy?     Poor appetite or overeating?     Feeling bad about yourself - or that you are a failure or have let yourself or your family down?     Trouble concentrating on things, such as reading the newspaper or watching television?     Moving or speaking so slowly that other people could have noticed? Or the opposite - being so fidgety or restless that you have been moving around a lot more than usual?     Thoughts that you would be better off dead, or of hurting yourself in some way?     PHQ-9 Total Score     If you checked off any problems, how difficult have these problems made it for you to do your work, take care of things at home, or get along with other people?       Allergies   Allergen Reactions   • Codeine GI Intolerance and Nausea Only     Sick at stomach       Prior to Admission medications    Medication Sig Start Date End Date Taking? Authorizing Provider   Eliquis 5 MG tablet tablet TAKE ONE TABLET BY MOUTH TWICE A DAY 11/8/21  Yes Tanvir Garcia, DO   lisinopril (PRINIVIL,ZESTRIL) 20 MG tablet TAKE ONE TABLET BY MOUTH DAILY 1/24/22  Yes Tanvir Garcia,  DO   metoprolol succinate XL (TOPROL-XL) 50 MG 24 hr tablet TAKE ONE TABLET BY MOUTH DAILY 22  Yes Tanvir Garcia DO   Multiple Vitamin (MULTI-VITAMIN DAILY PO) Take 1 tablet by mouth Daily.   Yes Provider, MD Eva   predniSONE (DELTASONE) 1 MG tablet TAKE ONE TABLET BY MOUTH FOUR TIMES A DAY 22  Yes Tanvir Garcia DO   sertraline (ZOLOFT) 25 MG tablet TAKE ONE TABLET BY MOUTH DAILY 22  Yes Tanvir Garcia DO        Patient Active Problem List   Diagnosis   • Long term current use of systemic steroids   • Hypertension, essential   • PMR (polymyalgia rheumatica) (HCC)   • Depression   • Pulmonary embolism (HCC)   • Pneumonia   • Hospital discharge follow-up   • Anxiety   • Arthritis   • Broken bones   • Coagulation disorder (HCC)   • Hemorrhoid   • Kidney stone   • Nephrolithiasis   • Cough        Past Surgical History:   Procedure Laterality Date   • ARTHROPLASTY      Artificial Joints/Limbs   • BACK SURGERY      Lumbar   • COLONOSCOPY     • ESOPHAGUS SURGERY      stretching   • FEMUR FRACTURE SURGERY Left    • HERNIA REPAIR     • HIP FRACTURE SURGERY Left    • KNEE SURGERY Right    • NECK SURGERY     • SHOULDER SURGERY Left     Left Tear       Social History     Socioeconomic History   • Marital status:    Tobacco Use   • Smoking status: Former Smoker     Packs/day: 1.00     Types: Cigarettes     Start date:      Quit date: 1972     Years since quittin.4   • Smokeless tobacco: Never Used   • Tobacco comment: Quit 46 years ago   Vaping Use   • Vaping Use: Never used   Substance and Sexual Activity   • Alcohol use: Never   • Drug use: Never   • Sexual activity: Defer       Family History   Problem Relation Age of Onset   • Arthritis Mother    • Leukemia Mother    • Heart disease Brother        Family history, surgical history, past medical history, Allergies and med's reviewed with patient today and updated in [facility] EMR.  "    ROS:  Review of Systems   Constitutional: Negative for fatigue.   HENT: Positive for rhinorrhea. Negative for congestion and postnasal drip.    Respiratory: Positive for cough. Negative for chest tightness, shortness of breath and wheezing.    Cardiovascular: Negative for chest pain and palpitations.   Neurological: Negative for headache.       OBJECTIVE:  Vitals:    06/03/22 1459   BP: 158/61   BP Location: Right arm   Patient Position: Sitting   Pulse: 70   Temp: 97.9 °F (36.6 °C)   SpO2: 96%   Weight: 88 kg (194 lb)   Height: 193 cm (76\")     No exam data present   Body mass index is 23.61 kg/m².  No LMP for male patient.    Physical Exam  Vitals and nursing note reviewed.   Constitutional:       General: He is not in acute distress.     Appearance: Normal appearance. He is normal weight.   HENT:      Head: Normocephalic.      Right Ear: Tympanic membrane, ear canal and external ear normal.      Left Ear: Tympanic membrane, ear canal and external ear normal.      Nose: Nose normal.      Mouth/Throat:      Mouth: Mucous membranes are moist.      Pharynx: Oropharynx is clear.   Eyes:      General: No scleral icterus.     Conjunctiva/sclera: Conjunctivae normal.      Pupils: Pupils are equal, round, and reactive to light.   Cardiovascular:      Rate and Rhythm: Normal rate and regular rhythm.      Pulses: Normal pulses.      Heart sounds: Murmur heard.    Crescendo decrescendo systolic murmur is present with a grade of 2/6.  Pulmonary:      Effort: Pulmonary effort is normal.      Breath sounds: Examination of the right-lower field reveals rales. Rales present. No wheezing or rhonchi.          Comments: crackles  Musculoskeletal:      Cervical back: Neck supple. No rigidity or tenderness.   Lymphadenopathy:      Cervical: No cervical adenopathy.   Skin:     General: Skin is warm and dry.      Coloration: Skin is not jaundiced.      Findings: No rash.   Neurological:      General: No focal deficit present.      " Mental Status: He is alert and oriented to person, place, and time.   Psychiatric:         Mood and Affect: Mood normal.         Thought Content: Thought content normal.         Judgment: Judgment normal.         Procedures    No visits with results within 30 Day(s) from this visit.   Latest known visit with results is:   Office Visit on 05/02/2022   Component Date Value Ref Range Status   • WBC 05/02/2022 11.41 (A) 3.40 - 10.80 10*3/mm3 Final   • RBC 05/02/2022 4.62  4.14 - 5.80 10*6/mm3 Final   • Hemoglobin 05/02/2022 15.1  13.0 - 17.7 g/dL Final   • Hematocrit 05/02/2022 45.6  37.5 - 51.0 % Final   • MCV 05/02/2022 98.7 (A) 79.0 - 97.0 fL Final   • MCH 05/02/2022 32.7  26.6 - 33.0 pg Final   • MCHC 05/02/2022 33.1  31.5 - 35.7 g/dL Final   • RDW 05/02/2022 12.9  12.3 - 15.4 % Final   • RDW-SD 05/02/2022 46.5  37.0 - 54.0 fl Final   • MPV 05/02/2022 10.0  6.0 - 12.0 fL Final   • Platelets 05/02/2022 349  140 - 450 10*3/mm3 Final   • Neutrophil % 05/02/2022 74.9  42.7 - 76.0 % Final   • Lymphocyte % 05/02/2022 13.4 (A) 19.6 - 45.3 % Final   • Monocyte % 05/02/2022 6.5  5.0 - 12.0 % Final   • Eosinophil % 05/02/2022 2.8  0.3 - 6.2 % Final   • Basophil % 05/02/2022 0.6  0.0 - 1.5 % Final   • Immature Grans % 05/02/2022 1.8 (A) 0.0 - 0.5 % Final   • Neutrophils, Absolute 05/02/2022 8.54 (A) 1.70 - 7.00 10*3/mm3 Final   • Lymphocytes, Absolute 05/02/2022 1.53  0.70 - 3.10 10*3/mm3 Final   • Monocytes, Absolute 05/02/2022 0.74  0.10 - 0.90 10*3/mm3 Final   • Eosinophils, Absolute 05/02/2022 0.32  0.00 - 0.40 10*3/mm3 Final   • Basophils, Absolute 05/02/2022 0.07  0.00 - 0.20 10*3/mm3 Final   • Immature Grans, Absolute 05/02/2022 0.21 (A) 0.00 - 0.05 10*3/mm3 Final   • nRBC 05/02/2022 0.0  0.0 - 0.2 /100 WBC Final       ASSESSMENT/ PLAN:    Diagnoses and all orders for this visit:    1. Hypertension, essential (Primary)  Assessment & Plan:  His blood pressure reading here is mildly elevated, but his home blood pressure  readings have consistently been very good.  We will make no changes in his current meds.    Orders:  -     XR Chest PA & Lateral; Future    2. Cough  Assessment & Plan:  He states he did have a cough that started yesterday.  Today feels it somewhat improved.  He does have some crackles in his right base.  We will get a chest x-ray to assess this further.  Since he is feeling better today we will hold off on any treatment pending his chest x-ray.    Orders:  -     XR Chest PA & Lateral; Future      Orders Placed Today:     No orders of the defined types were placed in this encounter.       Management Plan:     An After Visit Summary was printed and given to the patient at discharge.    Follow-up: Return in about 4 months (around 10/3/2022) for Recheck.    Tanvir Garcia DO 6/3/2022 15:17 EDT  This note was electronically signed.

## 2022-06-03 NOTE — ASSESSMENT & PLAN NOTE
He states he did have a cough that started yesterday.  Today feels it somewhat improved.  He does have some crackles in his right base.  We will get a chest x-ray to assess this further.  Since he is feeling better today we will hold off on any treatment pending his chest x-ray.

## 2022-07-25 RX ORDER — LISINOPRIL 20 MG/1
TABLET ORAL
Qty: 90 TABLET | Refills: 1 | Status: SHIPPED | OUTPATIENT
Start: 2022-07-25 | End: 2023-01-16

## 2022-08-02 ENCOUNTER — OFFICE VISIT (OUTPATIENT)
Dept: ORTHOPEDIC SURGERY | Facility: CLINIC | Age: 77
End: 2022-08-02

## 2022-08-02 VITALS — BODY MASS INDEX: 23.5 KG/M2 | HEIGHT: 76 IN | WEIGHT: 193 LBS | HEART RATE: 63 BPM | OXYGEN SATURATION: 97 %

## 2022-08-02 DIAGNOSIS — M17.12 PRIMARY OSTEOARTHRITIS OF LEFT KNEE: ICD-10-CM

## 2022-08-02 DIAGNOSIS — M25.562 LEFT KNEE PAIN, UNSPECIFIED CHRONICITY: Primary | ICD-10-CM

## 2022-08-02 PROCEDURE — 99213 OFFICE O/P EST LOW 20 MIN: CPT | Performed by: PHYSICIAN ASSISTANT

## 2022-08-02 PROCEDURE — 20610 DRAIN/INJ JOINT/BURSA W/O US: CPT | Performed by: PHYSICIAN ASSISTANT

## 2022-08-02 NOTE — PATIENT INSTRUCTIONS
X-rays taken and reviewed today. Patient elected to try a Synvisc One injection today. Advised on duration between the injections.       Follow up as needed.
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
## 2022-08-04 NOTE — PROGRESS NOTES
[facility]  Cardiology progress Note    Patient Name: Walekr Ordoñez  : 1945    CHIEF COMPLAINT  Hypertension        Subjective   Subjective     HISTORY OF PRESENT ILLNESS    Walker Ordoñez is a 76 y.o. male with history of hypertension and palpitations.  No further palpitations.  Blood pressure controlled at home.    REVIEW OF SYSTEMS    Constitutional:    No fever, no weight loss  Skin:     No rash  Otolaryngeal:    No difficulty swallowing  Cardiovascular:  No chest pain or shortness of breath  Pulmonary:    No cough, no sputum production    Personal History     Social History:    reports that he quit smoking about 50 years ago. His smoking use included cigarettes. He started smoking about 57 years ago. He smoked 1.00 pack per day. He has never used smokeless tobacco. He reports that he does not drink alcohol and does not use drugs.    Home Medications:  Current Outpatient Medications on File Prior to Visit   Medication Sig   • Eliquis 5 MG tablet tablet TAKE ONE TABLET BY MOUTH TWICE A DAY   • lisinopril (PRINIVIL,ZESTRIL) 20 MG tablet TAKE ONE TABLET BY MOUTH DAILY   • metoprolol succinate XL (TOPROL-XL) 50 MG 24 hr tablet TAKE ONE TABLET BY MOUTH DAILY   • Multiple Vitamin (MULTI-VITAMIN DAILY PO) Take 1 tablet by mouth Daily.   • predniSONE (DELTASONE) 1 MG tablet TAKE ONE TABLET BY MOUTH FOUR TIMES A DAY   • sertraline (ZOLOFT) 25 MG tablet TAKE ONE TABLET BY MOUTH DAILY     No current facility-administered medications on file prior to visit.       Past Medical History:   Diagnosis Date   • Anxiety    • Arthritis    • Blood clotting disorder ([facility]) 2017    6 yrs ago pt had blood clot TJ   • Broken bones    • Depression    • Hemorrhoid    • Nephrolithiasis    • PMR (polymyalgia rheumatica) ([facility])    • Pulmonary embolism ([facility])        Allergies:  Allergies   Allergen Reactions   • Codeine GI Intolerance and Nausea Only     Sick at stomach       Objective    Objective       Vitals:    Heart Rate:  [66-68] 66  BP: (148-154)/(68-70) 148/70  Body mass index is 23.49 kg/m².     PHYSICAL EXAM:    General Appearance:   · well developed  · well nourished  HENT:   · oropharynx moist  · lips not cyanotic  Neck:  · thyroid not enlarged  · supple  Respiratory:  · no respiratory distress  · normal breath sounds  · no rales  Cardiovascular:  · no jugular venous distention  · regular rhythm  · apical impulse normal  · S1 normal, S2 normal  · no S3, no S4   · no murmur  · no rub, no thrill  · carotid pulses normal; no bruit  · pedal pulses normal  · lower extremity edema: none    Skin:   · warm, dry  Psychiatric:  · judgement and insight appropriate  · normal mood and affect        Result Review:  I have personally reviewed the available results from  [x]  Laboratory  [x]  EKG  [x]  Cardiology  [x]  Medications  [x]  Old records  []  Other:     Procedures  Lab Results   Component Value Date    CHOL 159 11/04/2021     Lab Results   Component Value Date    TRIG 229 (H) 11/04/2021    TRIG 271 (H) 05/03/2021     Lab Results   Component Value Date    HDL 33 (L) 11/04/2021    HDL 31 (L) 05/03/2021     Lab Results   Component Value Date    LDL 87 11/04/2021    LDL 77 05/03/2021     Lab Results   Component Value Date    VLDL 39 11/04/2021    VLDL 54 (H) 05/03/2021        Impression/Plan:  1.  Stable palpitations: Continue Toprol-XL 50 mg a day.  No further palpitations.  2.  Essential hypertension controlled: Continue lisinopril 20 mg a day.  Continue Toprol-XL 50 mg a day.  Blood pressure controlled at home.  3.  History of pulmonary embolism: Continue Eliquis 5 mg BID.           Bimal Rivera MD   08/05/22   10:17 EDT

## 2022-08-05 ENCOUNTER — OFFICE VISIT (OUTPATIENT)
Dept: CARDIOLOGY | Facility: CLINIC | Age: 77
End: 2022-08-05

## 2022-08-05 VITALS
SYSTOLIC BLOOD PRESSURE: 148 MMHG | WEIGHT: 193 LBS | HEIGHT: 76 IN | DIASTOLIC BLOOD PRESSURE: 70 MMHG | BODY MASS INDEX: 23.5 KG/M2 | HEART RATE: 66 BPM

## 2022-08-05 DIAGNOSIS — I10 HYPERTENSION, ESSENTIAL: Primary | ICD-10-CM

## 2022-08-05 DIAGNOSIS — R00.2 PALPITATIONS: ICD-10-CM

## 2022-08-05 PROCEDURE — 99214 OFFICE O/P EST MOD 30 MIN: CPT | Performed by: SPECIALIST

## 2022-10-07 ENCOUNTER — OFFICE VISIT (OUTPATIENT)
Dept: FAMILY MEDICINE CLINIC | Facility: CLINIC | Age: 77
End: 2022-10-07

## 2022-10-07 VITALS
DIASTOLIC BLOOD PRESSURE: 62 MMHG | TEMPERATURE: 98 F | OXYGEN SATURATION: 98 % | HEART RATE: 62 BPM | BODY MASS INDEX: 23.62 KG/M2 | WEIGHT: 194 LBS | HEIGHT: 76 IN | SYSTOLIC BLOOD PRESSURE: 143 MMHG

## 2022-10-07 DIAGNOSIS — I26.99 PULMONARY EMBOLISM, UNSPECIFIED CHRONICITY, UNSPECIFIED PULMONARY EMBOLISM TYPE, UNSPECIFIED WHETHER ACUTE COR PULMONALE PRESENT: ICD-10-CM

## 2022-10-07 DIAGNOSIS — I10 HYPERTENSION, ESSENTIAL: Primary | ICD-10-CM

## 2022-10-07 DIAGNOSIS — Z11.59 NEED FOR HEPATITIS C SCREENING TEST: ICD-10-CM

## 2022-10-07 LAB
ALBUMIN SERPL-MCNC: 4.2 G/DL (ref 3.5–5.2)
ALBUMIN/GLOB SERPL: 1.7 G/DL
ALP SERPL-CCNC: 68 U/L (ref 39–117)
ALT SERPL W P-5'-P-CCNC: 18 U/L (ref 1–41)
ANION GAP SERPL CALCULATED.3IONS-SCNC: 6 MMOL/L (ref 5–15)
AST SERPL-CCNC: 27 U/L (ref 1–40)
BASOPHILS # BLD AUTO: 0.07 10*3/MM3 (ref 0–0.2)
BASOPHILS NFR BLD AUTO: 0.8 % (ref 0–1.5)
BILIRUB SERPL-MCNC: 0.3 MG/DL (ref 0–1.2)
BUN SERPL-MCNC: 19 MG/DL (ref 8–23)
BUN/CREAT SERPL: 17.1 (ref 7–25)
CALCIUM SPEC-SCNC: 9.8 MG/DL (ref 8.6–10.5)
CHLORIDE SERPL-SCNC: 105 MMOL/L (ref 98–107)
CHOLEST SERPL-MCNC: 154 MG/DL (ref 0–200)
CO2 SERPL-SCNC: 30 MMOL/L (ref 22–29)
CREAT SERPL-MCNC: 1.11 MG/DL (ref 0.76–1.27)
DEPRECATED RDW RBC AUTO: 43.9 FL (ref 37–54)
EGFRCR SERPLBLD CKD-EPI 2021: 68 ML/MIN/1.73
EOSINOPHIL # BLD AUTO: 0.25 10*3/MM3 (ref 0–0.4)
EOSINOPHIL NFR BLD AUTO: 2.9 % (ref 0.3–6.2)
ERYTHROCYTE [DISTWIDTH] IN BLOOD BY AUTOMATED COUNT: 12.9 % (ref 12.3–15.4)
GLOBULIN UR ELPH-MCNC: 2.5 GM/DL
GLUCOSE SERPL-MCNC: 107 MG/DL (ref 65–99)
HCT VFR BLD AUTO: 44.4 % (ref 37.5–51)
HCV AB SER DONR QL: NORMAL
HDLC SERPL-MCNC: 29 MG/DL (ref 40–60)
HGB BLD-MCNC: 15.3 G/DL (ref 13–17.7)
IMM GRANULOCYTES # BLD AUTO: 0.06 10*3/MM3 (ref 0–0.05)
IMM GRANULOCYTES NFR BLD AUTO: 0.7 % (ref 0–0.5)
LDLC SERPL CALC-MCNC: 80 MG/DL (ref 0–100)
LDLC/HDLC SERPL: 2.41 {RATIO}
LYMPHOCYTES # BLD AUTO: 1.48 10*3/MM3 (ref 0.7–3.1)
LYMPHOCYTES NFR BLD AUTO: 17.4 % (ref 19.6–45.3)
MCH RBC QN AUTO: 32.7 PG (ref 26.6–33)
MCHC RBC AUTO-ENTMCNC: 34.5 G/DL (ref 31.5–35.7)
MCV RBC AUTO: 94.9 FL (ref 79–97)
MONOCYTES # BLD AUTO: 0.71 10*3/MM3 (ref 0.1–0.9)
MONOCYTES NFR BLD AUTO: 8.4 % (ref 5–12)
NEUTROPHILS NFR BLD AUTO: 5.92 10*3/MM3 (ref 1.7–7)
NEUTROPHILS NFR BLD AUTO: 69.8 % (ref 42.7–76)
NRBC BLD AUTO-RTO: 0 /100 WBC (ref 0–0.2)
PLATELET # BLD AUTO: 325 10*3/MM3 (ref 140–450)
PMV BLD AUTO: 10 FL (ref 6–12)
POTASSIUM SERPL-SCNC: 5.2 MMOL/L (ref 3.5–5.2)
PROT SERPL-MCNC: 6.7 G/DL (ref 6–8.5)
RBC # BLD AUTO: 4.68 10*6/MM3 (ref 4.14–5.8)
SODIUM SERPL-SCNC: 141 MMOL/L (ref 136–145)
TRIGL SERPL-MCNC: 275 MG/DL (ref 0–150)
VLDLC SERPL-MCNC: 45 MG/DL (ref 5–40)
WBC NRBC COR # BLD: 8.49 10*3/MM3 (ref 3.4–10.8)

## 2022-10-07 PROCEDURE — 36415 COLL VENOUS BLD VENIPUNCTURE: CPT | Performed by: FAMILY MEDICINE

## 2022-10-07 PROCEDURE — 85025 COMPLETE CBC W/AUTO DIFF WBC: CPT | Performed by: FAMILY MEDICINE

## 2022-10-07 PROCEDURE — 80053 COMPREHEN METABOLIC PANEL: CPT | Performed by: FAMILY MEDICINE

## 2022-10-07 PROCEDURE — 86803 HEPATITIS C AB TEST: CPT | Performed by: FAMILY MEDICINE

## 2022-10-07 PROCEDURE — 80061 LIPID PANEL: CPT | Performed by: FAMILY MEDICINE

## 2022-10-07 PROCEDURE — 99213 OFFICE O/P EST LOW 20 MIN: CPT | Performed by: FAMILY MEDICINE

## 2022-10-07 NOTE — PROGRESS NOTES
Chief Complaint   Patient presents with   • Follow-up     4 month   • Hypertension        Subjective     Walker Ordoñez  has a past medical history of Anxiety, Arthritis, Blood clotting disorder ([facility]) (04/27/2017), Broken bones, Depression, Hemorrhoid, Nephrolithiasis, PMR (polymyalgia rheumatica) ([facility]), and Pulmonary embolism ([facility]).    Hypertension- he does check his blood pressure outside the office.  It is typically in the 130s over 70s.    History of pulmonary embolism- he takes his Eliquis twice daily every day.  He denies any frequent nosebleeds gross hematuria or grossly bloody bowel movements.  He also denies any large unexplained bruises.      PHQ-2 Depression Screening  Little interest or pleasure in doing things?     Feeling down, depressed, or hopeless?     PHQ-2 Total Score     PHQ-9 Depression Screening  Little interest or pleasure in doing things?     Feeling down, depressed, or hopeless?     Trouble falling or staying asleep, or sleeping too much?     Feeling tired or having little energy?     Poor appetite or overeating?     Feeling bad about yourself - or that you are a failure or have let yourself or your family down?     Trouble concentrating on things, such as reading the newspaper or watching television?     Moving or speaking so slowly that other people could have noticed? Or the opposite - being so fidgety or restless that you have been moving around a lot more than usual?     Thoughts that you would be better off dead, or of hurting yourself in some way?     PHQ-9 Total Score     If you checked off any problems, how difficult have these problems made it for you to do your work, take care of things at home, or get along with other people?       Allergies   Allergen Reactions   • Codeine GI Intolerance and Nausea Only     Sick at stomach       Prior to Admission medications    Medication Sig Start Date End Date Taking? Authorizing Provider   Eliquis 5 MG tablet tablet TAKE ONE TABLET BY MOUTH  TWICE A DAY 21  Yes Tanvir Garcia DO   lisinopril (PRINIVIL,ZESTRIL) 20 MG tablet TAKE ONE TABLET BY MOUTH DAILY 22  Yes Tanvir Garcia DO   metoprolol succinate XL (TOPROL-XL) 50 MG 24 hr tablet TAKE ONE TABLET BY MOUTH DAILY 22  Yes Tanvir Garcia DO   Multiple Vitamin (MULTI-VITAMIN DAILY PO) Take 1 tablet by mouth Daily.   Yes Provider, MD Eva   predniSONE (DELTASONE) 1 MG tablet TAKE ONE TABLET BY MOUTH FOUR TIMES A DAY 22  Yes Tanvir Garcia DO   sertraline (ZOLOFT) 25 MG tablet TAKE ONE TABLET BY MOUTH DAILY 22  Yes Tanvir Garcia DO        Patient Active Problem List   Diagnosis   • Long term current use of systemic steroids   • Hypertension, essential   • PMR (polymyalgia rheumatica) ([facility])   • Depression   • Pulmonary embolism ([facility])   • Pneumonia   • Hospital discharge follow-up   • Anxiety   • Arthritis   • Broken bones   • Coagulation disorder ([facility])   • Hemorrhoid   • Kidney stone   • Nephrolithiasis   • Cough   • Primary osteoarthritis of left knee        Past Surgical History:   Procedure Laterality Date   • ARTHROPLASTY      Artificial Joints/Limbs   • BACK SURGERY      Lumbar   • COLONOSCOPY  2019   • ESOPHAGUS SURGERY      stretching   • FEMUR FRACTURE SURGERY Left    • HERNIA REPAIR     • HIP FRACTURE SURGERY Left    • KNEE SURGERY Right    • NECK SURGERY     • SHOULDER SURGERY Left     Left Tear       Social History     Socioeconomic History   • Marital status:    Tobacco Use   • Smoking status: Former Smoker     Packs/day: 1.00     Types: Cigarettes     Start date:      Quit date:      Years since quittin.8   • Smokeless tobacco: Never Used   • Tobacco comment: Quit 46 years ago   Vaping Use   • Vaping Use: Never used   Substance and Sexual Activity   • Alcohol use: Never   • Drug use: Never   • Sexual activity: Defer       Family History   Problem Relation Age of Onset   •  "Arthritis Mother    • Leukemia Mother    • Heart disease Brother        Family history, surgical history, past medical history, Allergies and meds reviewed with patient today and updated in [facility] EMR.     ROS:  Review of Systems   Constitutional: Negative for fatigue.   HENT: Negative for nosebleeds.    Respiratory: Negative for cough, chest tightness, shortness of breath and wheezing.    Cardiovascular: Negative for chest pain and palpitations.   Gastrointestinal: Negative for blood in stool.   Genitourinary: Negative for hematuria.   Neurological: Negative for headache.       OBJECTIVE:  Vitals:    10/07/22 1455   BP: 143/62   BP Location: Right arm   Patient Position: Sitting   Pulse: 62   Temp: 98 °F (36.7 °C)   SpO2: 98%   Weight: 88 kg (194 lb)   Height: 193 cm (76\")     No exam data present   Body mass index is 23.61 kg/m².  No LMP for male patient.    Physical Exam  Vitals and nursing note reviewed.   Constitutional:       General: He is not in acute distress.     Appearance: Normal appearance. He is normal weight.   HENT:      Head: Normocephalic.   Cardiovascular:      Rate and Rhythm: Normal rate and regular rhythm.      Heart sounds: Murmur heard.    Crescendo decrescendo systolic murmur is present with a grade of 2/6.  Pulmonary:      Effort: Pulmonary effort is normal.      Breath sounds: No wheezing, rhonchi or rales.   Neurological:      Mental Status: He is alert and oriented to person, place, and time.   Psychiatric:         Mood and Affect: Mood normal.         Behavior: Behavior normal.         Thought Content: Thought content normal.         Judgment: Judgment normal.         Procedures    No visits with results within 30 Day(s) from this visit.   Latest known visit with results is:   Office Visit on 05/02/2022   Component Date Value Ref Range Status   • WBC 05/02/2022 11.41 (A) 3.40 - 10.80 10*3/mm3 Final   • RBC 05/02/2022 4.62  4.14 - 5.80 10*6/mm3 Final   • Hemoglobin 05/02/2022 15.1  13.0 - " 17.7 g/dL Final   • Hematocrit 05/02/2022 45.6  37.5 - 51.0 % Final   • MCV 05/02/2022 98.7 (A) 79.0 - 97.0 fL Final   • MCH 05/02/2022 32.7  26.6 - 33.0 pg Final   • MCHC 05/02/2022 33.1  31.5 - 35.7 g/dL Final   • RDW 05/02/2022 12.9  12.3 - 15.4 % Final   • RDW-SD 05/02/2022 46.5  37.0 - 54.0 fl Final   • MPV 05/02/2022 10.0  6.0 - 12.0 fL Final   • Platelets 05/02/2022 349  140 - 450 10*3/mm3 Final   • Neutrophil % 05/02/2022 74.9  42.7 - 76.0 % Final   • Lymphocyte % 05/02/2022 13.4 (A) 19.6 - 45.3 % Final   • Monocyte % 05/02/2022 6.5  5.0 - 12.0 % Final   • Eosinophil % 05/02/2022 2.8  0.3 - 6.2 % Final   • Basophil % 05/02/2022 0.6  0.0 - 1.5 % Final   • Immature Grans % 05/02/2022 1.8 (A) 0.0 - 0.5 % Final   • Neutrophils, Absolute 05/02/2022 8.54 (A) 1.70 - 7.00 10*3/mm3 Final   • Lymphocytes, Absolute 05/02/2022 1.53  0.70 - 3.10 10*3/mm3 Final   • Monocytes, Absolute 05/02/2022 0.74  0.10 - 0.90 10*3/mm3 Final   • Eosinophils, Absolute 05/02/2022 0.32  0.00 - 0.40 10*3/mm3 Final   • Basophils, Absolute 05/02/2022 0.07  0.00 - 0.20 10*3/mm3 Final   • Immature Grans, Absolute 05/02/2022 0.21 (A) 0.00 - 0.05 10*3/mm3 Final   • nRBC 05/02/2022 0.0  0.0 - 0.2 /100 WBC Final       ASSESSMENT/ PLAN:    Diagnoses and all orders for this visit:    1. Hypertension, essential (Primary)  Assessment & Plan:  His blood pressure at home is even better than here.  We will continue his current meds and update his labs.    Orders:  -     Comprehensive Metabolic Panel  -     Lipid Panel  -     CBC Auto Differential    2. Pulmonary embolism, unspecified chronicity, unspecified pulmonary embolism type, unspecified whether acute cor pulmonale present (HCC)      Orders Placed Today:     No orders of the defined types were placed in this encounter.       Management Plan:     An After Visit Summary was printed and given to the patient at discharge.    Follow-up: Return in about 6 months (around 4/7/2023) for Recheck.    Tanvir  Noel Garcia DO 10/7/2022 15:32 EDT  This note was electronically signed.

## 2022-10-07 NOTE — ASSESSMENT & PLAN NOTE
His blood pressure at home is even better than here.  We will continue his current meds and update his labs.

## 2022-11-21 RX ORDER — SERTRALINE HYDROCHLORIDE 25 MG/1
TABLET, FILM COATED ORAL
Qty: 90 TABLET | Refills: 1 | Status: SHIPPED | OUTPATIENT
Start: 2022-11-21

## 2022-11-21 RX ORDER — METOPROLOL SUCCINATE 50 MG/1
TABLET, EXTENDED RELEASE ORAL
Qty: 90 TABLET | Refills: 1 | Status: SHIPPED | OUTPATIENT
Start: 2022-11-21

## 2022-12-05 RX ORDER — APIXABAN 5 MG/1
TABLET, FILM COATED ORAL
Qty: 60 TABLET | Refills: 12 | Status: SHIPPED | OUTPATIENT
Start: 2022-12-05

## 2023-01-16 RX ORDER — PREDNISONE 1 MG/1
TABLET ORAL
Qty: 360 TABLET | Refills: 1 | Status: SHIPPED | OUTPATIENT
Start: 2023-01-16

## 2023-01-16 RX ORDER — LISINOPRIL 20 MG/1
TABLET ORAL
Qty: 90 TABLET | Refills: 1 | Status: SHIPPED | OUTPATIENT
Start: 2023-01-16

## 2023-03-02 NOTE — PROGRESS NOTES
Patient:   GEOVANY NGUYEN            MRN: [id number]            FIN: [id number]              Age:   89 years     Sex:  FEMALE     :  25   Associated Diagnoses:   None   Author:   SIMA BUSTAMANTE, MARGY     Subjective   No issues overnight  Denies complaints this morning     Physical Examination   VS/Measurements     Vitals between:   2019 08:25:25   TO   10-AINSLEY-2019 08:25:25                   LAST RESULT MINIMUM MAXIMUM  Temperature 36.4 36.4 37.1  Heart Rate 64 63 90  Respiratory Rate 18 18 22  NISBP           125 108 138  NIDBP           62 62 89  NIMBP           83 83 105  SpO2                    99 95 100  FiO2                    0.35 0.35 0.35    Intake and Output   I&O 24 hr   I & O between:  2019 08:25 TO 10-AINSLEY-2019 08:25  Med Dosing Weight:  48.2  kg   2019  24 Hour Intake:   224.11  ( 4.65 mL/kg )  24 Hour Output:   775.00           24 Hour Urine/Stool Output:   0.0  24 Hour Balance:   -550.89           24 Hour Urine Output:   775.00  ( 0.67 mL/kg/hr )                   Urine Count:  11.00       Physical examination  General: no acute distress  Lungs: non labored breathing  CV: non tachycardic, biphasic right DP and PT signals  MSK: left AKA weel healed. Right foot with mild erythema and edema surrouning 1st MT, 1cm ulcers at lateral aspect and plantar surface of 1st PIP joint, no pururlence or flucutance  Neuro: alert and oriented        Review / Management   Laboratory results:     Labs between:  2019 08:25 to 10-AINSLEY-2019 08:25  CBC:                 WBC  HgB  Hct  Plt  MCV  RDW   10-AINSLEY-2019 (H) 11.4  (L) 10.4  (L) 33.2  324  91.5  14.7   BMP:                 Na  Cl  BUN  Glu   10-AINSLEY-2019 140  (H) 108  19  69                              K  CO2  Cr  Ca                              4.3  25  (H) 1.08  8.4   Other Chem:             Mg  Phos  Triglycerides  GGTP  DirectBili                           1.9           POC GLU:                 Latest Result    [facility]  Cardiology progress Note    Patient Name: Walker Ordoñez  : 1945    CHIEF COMPLAINT  Hypertension        Subjective   Subjective     HISTORY OF PRESENT ILLNESS    Walker Ordoñez is a 77 y.o. male with history of hypertension palpitations.  No further palpitations.    REVIEW OF SYSTEMS    Constitutional:    No fever, no weight loss  Skin:     No rash  Otolaryngeal:    No difficulty swallowing  Cardiovascular: See HPI.  Pulmonary:    No cough, no sputum production    Personal History     Social History:    reports that he quit smoking about 51 years ago. His smoking use included cigarettes. He started smoking about 58 years ago. He smoked an average of 1 pack per day. He has never used smokeless tobacco. He reports that he does not currently use alcohol. He reports that he does not use drugs.    Home Medications:  Current Outpatient Medications on File Prior to Visit   Medication Sig   • Eliquis 5 MG tablet tablet TAKE ONE TABLET BY MOUTH TWICE A DAY   • lisinopril (PRINIVIL,ZESTRIL) 20 MG tablet TAKE ONE TABLET BY MOUTH DAILY   • metoprolol succinate XL (TOPROL-XL) 50 MG 24 hr tablet TAKE ONE TABLET BY MOUTH DAILY   • Multiple Vitamin (MULTI-VITAMIN DAILY PO) Take 1 tablet by mouth Daily.   • predniSONE (DELTASONE) 1 MG tablet TAKE ONE TABLET BY MOUTH FOUR TIMES A DAY   • sertraline (ZOLOFT) 25 MG tablet TAKE ONE TABLET BY MOUTH DAILY     No current facility-administered medications on file prior to visit.       Past Medical History:   Diagnosis Date   • Anxiety    • Arthritis    • Blood clotting disorder (HCC) 2017    6 yrs ago pt had blood clot TJ   • Broken bones    • Deep vein thrombosis (HCC)    • Depression    • Hemorrhoid    • Hypertension    • Nephrolithiasis    • PMR (polymyalgia rheumatica) ([facility])    • Pulmonary embolism ([facility])        Allergies:  Allergies   Allergen Reactions   • Codeine GI Intolerance and Nausea Only     Sick at stomach       Objective    Objective        Vitals:   Heart Rate:  [64] 64  BP: (154)/(78) 154/78  Body mass index is 23.86 kg/m².     PHYSICAL EXAM:    General Appearance:   · well developed  · well nourished  HENT:   · oropharynx moist  · lips not cyanotic  Neck:  · thyroid not enlarged  · supple  Respiratory:  · no respiratory distress  · normal breath sounds  · no rales  Cardiovascular:  · no jugular venous distention  · regular rhythm  · apical impulse normal  · S1 normal, S2 normal  · no S3, no S4   · no murmur  · no rub, no thrill  · carotid pulses normal; no bruit  · pedal pulses normal  · lower extremity edema: none    Skin:   · warm, dry  Psychiatric:  · judgement and insight appropriate  · normal mood and affect        Result Review:  I have personally reviewed the available results from  [x]  Laboratory  [x]  EKG  [x]  Cardiology  [x]  Medications  [x]  Old records  []  Other:     Procedures  Lab Results   Component Value Date    CHOL 154 10/07/2022    CHOL 159 11/04/2021     Lab Results   Component Value Date    TRIG 275 (H) 10/07/2022    TRIG 229 (H) 11/04/2021    TRIG 271 (H) 05/03/2021     Lab Results   Component Value Date    HDL 29 (L) 10/07/2022    HDL 33 (L) 11/04/2021    HDL 31 (L) 05/03/2021     Lab Results   Component Value Date    LDL 80 10/07/2022    LDL 87 11/04/2021    LDL 77 05/03/2021     Lab Results   Component Value Date    VLDL 45 (H) 10/07/2022    VLDL 39 11/04/2021    VLDL 54 (H) 05/03/2021        Impression/Plan:  1.  Essential hypertension controlled: Continue lisinopril 20 mg once a day.  Continue Toprol-XL 50 mg once a day.  Blood pressure controlled at home.  2.  Stable palpitations: Continue Toprol-XL 50 mg once a day.  No further palpitations.  3.  History of pulm embolism: Continue Eliquis 5 mg twice a day.           Bimal Rivera MD   03/07/23   10:19 EST     Latest Date  Minimum  Min Date  Maximum  Max Date                             70 10-AINSLEY-2019 (L) 60  09-JUN-2019 91 09-JUN-2019  Blood Gas:            Ph  PCO2  PO2  BiCarb  BaseExcess   Arterial:  10-AINSLEY-2019 7.35  44  (L) 81  24  NOT APPLICABLE                              Ionized Ca  Na  K  HgB  Lactic Acid                              1.18  139  4.2  (L) 11.9  0.6                  .    Right lower extremity arterial brachial index (right MARIAM)  HISTORY: Atherosclerosis.  Peripheral arterial disease.  History of left below the knee amputation.  COMPARISON: 10/10/2018  FINDINGS:  The brachial artery index pressure is hypertensive measured at 165  Unable to compress the right lower extremity arteries.  This includes and noncompressibility at the right groin, right thigh, right thigh, the right ankle posterior tibial artery, and dorsum of the foot dorsalis pedis artery.  This is due to significant arterial wall hardening/arteriosclerosis.  Unable to obtain ankle-brachial index on the current study.  The right great toe pressure is a decreased at 90 without toe-brachial index of 0.55 which is decreased (previously measured (0.39).  The spectral waveform tracings of the right common femoral artery, superficial femoral artery, popliteal artery, posterior tibial artery at the ankle, and dorsalis pedis artery are abnormal, irregular, and monophasic.  There is a dampened pulse amplitude of the right great toe.  Overall, similar to prior study.  IMPRESSION:  Findings consistent with significant right lower extremity arterial insufficiency.  Findings include evidence of inflow disease with only monophasic flow throughout the right lower extremity artery.  Also, small arterial disease with dampened pressure and diminished toe-brachial index.  Also, diffuse arterial wall hardening and noncompressibility.     Impression and Plan   89 yo F presenting with CLI c/b cellulitis. Arterial RLE dopplers concerning for PAD. MRI  negative for osteomyelitis.  - Continue antibiotics  - Arterial dopplers reviewed, will discuss the possibility of angiogram (CO2 due to CKD)  -rest of care per medicine and podiatry  Will discuss with Dr. Faith.  Irina Moore, PGY1  Surgery    agree w  note  significant PAD  recommend wound care, ABX   no plan for angiogram at this time , due to advanced age at 89 yrs old  we will follow

## 2023-03-07 ENCOUNTER — OFFICE VISIT (OUTPATIENT)
Dept: CARDIOLOGY | Facility: CLINIC | Age: 78
End: 2023-03-07
Payer: MEDICARE

## 2023-03-07 VITALS
SYSTOLIC BLOOD PRESSURE: 154 MMHG | WEIGHT: 196 LBS | HEIGHT: 76 IN | BODY MASS INDEX: 23.87 KG/M2 | HEART RATE: 64 BPM | DIASTOLIC BLOOD PRESSURE: 78 MMHG

## 2023-03-07 DIAGNOSIS — R00.2 PALPITATIONS: Primary | ICD-10-CM

## 2023-03-07 DIAGNOSIS — I10 HYPERTENSION, ESSENTIAL: ICD-10-CM

## 2023-03-07 PROCEDURE — 99214 OFFICE O/P EST MOD 30 MIN: CPT | Performed by: SPECIALIST

## 2023-04-11 ENCOUNTER — OFFICE VISIT (OUTPATIENT)
Dept: FAMILY MEDICINE CLINIC | Facility: CLINIC | Age: 78
End: 2023-04-11
Payer: MEDICARE

## 2023-04-11 VITALS
TEMPERATURE: 97.9 F | HEART RATE: 60 BPM | WEIGHT: 195 LBS | HEIGHT: 76 IN | OXYGEN SATURATION: 100 % | BODY MASS INDEX: 23.75 KG/M2 | SYSTOLIC BLOOD PRESSURE: 142 MMHG | DIASTOLIC BLOOD PRESSURE: 63 MMHG

## 2023-04-11 DIAGNOSIS — I26.99 PULMONARY EMBOLISM, UNSPECIFIED CHRONICITY, UNSPECIFIED PULMONARY EMBOLISM TYPE, UNSPECIFIED WHETHER ACUTE COR PULMONALE PRESENT: ICD-10-CM

## 2023-04-11 DIAGNOSIS — F33.42 RECURRENT MAJOR DEPRESSIVE DISORDER, IN FULL REMISSION: ICD-10-CM

## 2023-04-11 DIAGNOSIS — M35.3 PMR (POLYMYALGIA RHEUMATICA): ICD-10-CM

## 2023-04-11 DIAGNOSIS — I10 HYPERTENSION, ESSENTIAL: Primary | ICD-10-CM

## 2023-04-11 LAB
ANION GAP SERPL CALCULATED.3IONS-SCNC: 10.5 MMOL/L (ref 5–15)
BUN SERPL-MCNC: 15 MG/DL (ref 8–23)
BUN/CREAT SERPL: 15.3 (ref 7–25)
CALCIUM SPEC-SCNC: 9.7 MG/DL (ref 8.6–10.5)
CHLORIDE SERPL-SCNC: 102 MMOL/L (ref 98–107)
CO2 SERPL-SCNC: 28.5 MMOL/L (ref 22–29)
CREAT SERPL-MCNC: 0.98 MG/DL (ref 0.76–1.27)
DEPRECATED RDW RBC AUTO: 45.9 FL (ref 37–54)
EGFRCR SERPLBLD CKD-EPI 2021: 78.4 ML/MIN/1.73
ERYTHROCYTE [DISTWIDTH] IN BLOOD BY AUTOMATED COUNT: 12.9 % (ref 12.3–15.4)
GLUCOSE SERPL-MCNC: 93 MG/DL (ref 65–99)
HCT VFR BLD AUTO: 44.2 % (ref 37.5–51)
HGB BLD-MCNC: 15.4 G/DL (ref 13–17.7)
MCH RBC QN AUTO: 33.8 PG (ref 26.6–33)
MCHC RBC AUTO-ENTMCNC: 34.8 G/DL (ref 31.5–35.7)
MCV RBC AUTO: 96.9 FL (ref 79–97)
PLATELET # BLD AUTO: 307 10*3/MM3 (ref 140–450)
PMV BLD AUTO: 10.1 FL (ref 6–12)
POTASSIUM SERPL-SCNC: 4.5 MMOL/L (ref 3.5–5.2)
RBC # BLD AUTO: 4.56 10*6/MM3 (ref 4.14–5.8)
SODIUM SERPL-SCNC: 141 MMOL/L (ref 136–145)
WBC NRBC COR # BLD: 9.13 10*3/MM3 (ref 3.4–10.8)

## 2023-04-11 PROCEDURE — 80048 BASIC METABOLIC PNL TOTAL CA: CPT | Performed by: FAMILY MEDICINE

## 2023-04-11 PROCEDURE — 85027 COMPLETE CBC AUTOMATED: CPT | Performed by: FAMILY MEDICINE

## 2023-04-11 NOTE — ASSESSMENT & PLAN NOTE
Is doing well without any recurrent signs or symptoms of recurrent thromboembolic events.  He will continue his Eliquis

## 2023-04-11 NOTE — ASSESSMENT & PLAN NOTE
His blood pressure at home is consistently much better than here.  We will continue his current meds and update his BMP

## 2023-04-11 NOTE — PROGRESS NOTES
Chief Complaint   Patient presents with   • Follow-up     6 month    • Hypertension        Subjective     Walker Ordoñez  has a past medical history of Anxiety, Arthritis, Blood clotting disorder (04/27/2017), Broken bones, Coagulation disorder, Deep vein thrombosis, Depression, Hemorrhoid, Nephrolithiasis, PMR (polymyalgia rheumatica), and Pulmonary embolism.    Hypertension- he does check his blood pressure at home on a regular basis.  He states its typically in the 130s over 70s.    History of DVT/PE- he continues to take his Eliquis on a daily basis.  He states he did have some blood in his urine a couple weeks ago but has not had any recurrent episodes.  He denies any recurrent nosebleeds or blood with his bowel movements.    Depression-states that this depression is doing well with his current medications    PMR-he states he is stable at his current dose of prednisone of 4 mg daily.  After our last visit he tried to go down to 3 mg daily but had worsening symptoms.      PHQ-2 Depression Screening  Little interest or pleasure in doing things?     Feeling down, depressed, or hopeless?     PHQ-2 Total Score     PHQ-9 Depression Screening  Little interest or pleasure in doing things?     Feeling down, depressed, or hopeless?     Trouble falling or staying asleep, or sleeping too much?     Feeling tired or having little energy?     Poor appetite or overeating?     Feeling bad about yourself - or that you are a failure or have let yourself or your family down?     Trouble concentrating on things, such as reading the newspaper or watching television?     Moving or speaking so slowly that other people could have noticed? Or the opposite - being so fidgety or restless that you have been moving around a lot more than usual?     Thoughts that you would be better off dead, or of hurting yourself in some way?     PHQ-9 Total Score     If you checked off any problems, how difficult have these problems made it for you to do  your work, take care of things at home, or get along with other people?       Allergies   Allergen Reactions   • Codeine GI Intolerance and Nausea Only     Sick at stomach       Prior to Admission medications    Medication Sig Start Date End Date Taking? Authorizing Provider   Eliquis 5 MG tablet tablet TAKE ONE TABLET BY MOUTH TWICE A DAY 12/5/22  Yes Tanvir Garcia DO   lisinopril (PRINIVIL,ZESTRIL) 20 MG tablet TAKE ONE TABLET BY MOUTH DAILY 1/16/23  Yes Tanvir Garcia DO   metoprolol succinate XL (TOPROL-XL) 50 MG 24 hr tablet TAKE ONE TABLET BY MOUTH DAILY 11/21/22  Yes Tanvir Garcia DO   Multiple Vitamin (MULTI-VITAMIN DAILY PO) Take 1 tablet by mouth Daily.   Yes Provider, MD Eva   predniSONE (DELTASONE) 1 MG tablet TAKE ONE TABLET BY MOUTH FOUR TIMES A DAY 1/16/23  Yes Tanvir Garcia DO   sertraline (ZOLOFT) 25 MG tablet TAKE ONE TABLET BY MOUTH DAILY 11/21/22  Yes Tanvir Garcia DO        Patient Active Problem List   Diagnosis   • Long term current use of systemic steroids   • Hypertension, essential   • PMR (polymyalgia rheumatica)   • Depression   • Pulmonary embolism   • Pneumonia   • Hospital discharge follow-up   • Anxiety   • Arthritis   • Broken bones   • Coagulation disorder   • Hemorrhoid   • Kidney stone   • Nephrolithiasis   • Cough   • Primary osteoarthritis of left knee        Past Surgical History:   Procedure Laterality Date   • ARTHROPLASTY      Artificial Joints/Limbs   • BACK SURGERY      Lumbar   • COLONOSCOPY  2019   • ESOPHAGUS SURGERY      stretching   • FEMUR FRACTURE SURGERY Left 2019   • HERNIA REPAIR     • HIP FRACTURE SURGERY Left 2019   • KNEE SURGERY Right 2005   • NECK SURGERY     • SHOULDER SURGERY Left 2000    Left Tear       Social History     Socioeconomic History   • Marital status:    Tobacco Use   • Smoking status: Former     Packs/day: 1.00     Types: Cigarettes     Start date: 1/1/1965     Quit date:  "1972     Years since quittin.3   • Smokeless tobacco: Never   • Tobacco comments:     Quit 46 years ago   Vaping Use   • Vaping Use: Never used   Substance and Sexual Activity   • Alcohol use: Not Currently   • Drug use: Never   • Sexual activity: Defer       Family History   Problem Relation Age of Onset   • Arthritis Mother    • Leukemia Mother    • Heart disease Brother        Family history, surgical history, past medical history, Allergies and meds reviewed with patient today and updated in [facility] EMR.     ROS:  Review of Systems   Constitutional: Negative for fatigue.   HENT: Positive for rhinorrhea. Negative for congestion and postnasal drip.    Eyes: Negative for blurred vision and visual disturbance.   Respiratory: Negative for cough, chest tightness, shortness of breath and wheezing.    Cardiovascular: Negative for chest pain and palpitations.   Musculoskeletal: Negative for myalgias.   Allergic/Immunologic: Positive for environmental allergies.   Neurological: Negative for headache.   Psychiatric/Behavioral: Negative for depressed mood. The patient is not nervous/anxious.        OBJECTIVE:  Vitals:    23 1510   BP: 142/63   BP Location: Right arm   Patient Position: Sitting   Pulse: 60   Temp: 97.9 °F (36.6 °C)   SpO2: 100%   Weight: 88.5 kg (195 lb)   Height: 193 cm (76\")     No results found.   Body mass index is 23.74 kg/m².  No LMP for male patient.    Physical Exam  Vitals and nursing note reviewed.   Constitutional:       General: He is not in acute distress.     Appearance: Normal appearance. He is normal weight.   HENT:      Head: Normocephalic.      Right Ear: Tympanic membrane, ear canal and external ear normal.      Left Ear: Tympanic membrane, ear canal and external ear normal.      Nose: Nose normal.      Mouth/Throat:      Mouth: Mucous membranes are moist.      Pharynx: Oropharynx is clear.   Eyes:      General: No scleral icterus.     Conjunctiva/sclera: Conjunctivae normal. "      Pupils: Pupils are equal, round, and reactive to light.   Cardiovascular:      Rate and Rhythm: Normal rate and regular rhythm.      Pulses: Normal pulses.      Heart sounds: Normal heart sounds. No murmur heard.  Pulmonary:      Effort: Pulmonary effort is normal.      Breath sounds: Normal breath sounds. No wheezing, rhonchi or rales.   Musculoskeletal:      Cervical back: Neck supple. No rigidity or tenderness.   Lymphadenopathy:      Cervical: No cervical adenopathy.   Skin:     General: Skin is warm and dry.      Coloration: Skin is not jaundiced.      Findings: No rash.   Neurological:      General: No focal deficit present.      Mental Status: He is alert and oriented to person, place, and time.   Psychiatric:         Mood and Affect: Mood normal.         Thought Content: Thought content normal.         Judgment: Judgment normal.         Procedures    No visits with results within 30 Day(s) from this visit.   Latest known visit with results is:   Office Visit on 10/07/2022   Component Date Value Ref Range Status   • Glucose 10/07/2022 107 (H)  65 - 99 mg/dL Final   • BUN 10/07/2022 19  8 - 23 mg/dL Final   • Creatinine 10/07/2022 1.11  0.76 - 1.27 mg/dL Final   • Sodium 10/07/2022 141  136 - 145 mmol/L Final   • Potassium 10/07/2022 5.2  3.5 - 5.2 mmol/L Final   • Chloride 10/07/2022 105  98 - 107 mmol/L Final   • CO2 10/07/2022 30.0 (H)  22.0 - 29.0 mmol/L Final   • Calcium 10/07/2022 9.8  8.6 - 10.5 mg/dL Final   • Total Protein 10/07/2022 6.7  6.0 - 8.5 g/dL Final   • Albumin 10/07/2022 4.20  3.50 - 5.20 g/dL Final   • ALT (SGPT) 10/07/2022 18  1 - 41 U/L Final   • AST (SGOT) 10/07/2022 27  1 - 40 U/L Final   • Alkaline Phosphatase 10/07/2022 68  39 - 117 U/L Final   • Total Bilirubin 10/07/2022 0.3  0.0 - 1.2 mg/dL Final   • Globulin 10/07/2022 2.5  gm/dL Final   • A/G Ratio 10/07/2022 1.7  g/dL Final   • BUN/Creatinine Ratio 10/07/2022 17.1  7.0 - 25.0 Final   • Anion Gap 10/07/2022 6.0  5.0 - 15.0  mmol/L Final   • eGFR 10/07/2022 68.0  >60.0 mL/min/1.73 Final    National Kidney Foundation and American Society of Nephrology (ASN) Task Force recommended calculation based on the Chronic Kidney Disease Epidemiology Collaboration (CKD-EPI) equation refit without adjustment for race.   • Total Cholesterol 10/07/2022 154  0 - 200 mg/dL Final   • Triglycerides 10/07/2022 275 (H)  0 - 150 mg/dL Final   • HDL Cholesterol 10/07/2022 29 (L)  40 - 60 mg/dL Final   • LDL Cholesterol  10/07/2022 80  0 - 100 mg/dL Final   • VLDL Cholesterol 10/07/2022 45 (H)  5 - 40 mg/dL Final   • LDL/HDL Ratio 10/07/2022 2.41   Final   • WBC 10/07/2022 8.49  3.40 - 10.80 10*3/mm3 Final   • RBC 10/07/2022 4.68  4.14 - 5.80 10*6/mm3 Final   • Hemoglobin 10/07/2022 15.3  13.0 - 17.7 g/dL Final   • Hematocrit 10/07/2022 44.4  37.5 - 51.0 % Final   • MCV 10/07/2022 94.9  79.0 - 97.0 fL Final   • MCH 10/07/2022 32.7  26.6 - 33.0 pg Final   • MCHC 10/07/2022 34.5  31.5 - 35.7 g/dL Final   • RDW 10/07/2022 12.9  12.3 - 15.4 % Final   • RDW-SD 10/07/2022 43.9  37.0 - 54.0 fl Final   • MPV 10/07/2022 10.0  6.0 - 12.0 fL Final   • Platelets 10/07/2022 325  140 - 450 10*3/mm3 Final   • Neutrophil % 10/07/2022 69.8  42.7 - 76.0 % Final   • Lymphocyte % 10/07/2022 17.4 (L)  19.6 - 45.3 % Final   • Monocyte % 10/07/2022 8.4  5.0 - 12.0 % Final   • Eosinophil % 10/07/2022 2.9  0.3 - 6.2 % Final   • Basophil % 10/07/2022 0.8  0.0 - 1.5 % Final   • Immature Grans % 10/07/2022 0.7 (H)  0.0 - 0.5 % Final   • Neutrophils, Absolute 10/07/2022 5.92  1.70 - 7.00 10*3/mm3 Final   • Lymphocytes, Absolute 10/07/2022 1.48  0.70 - 3.10 10*3/mm3 Final   • Monocytes, Absolute 10/07/2022 0.71  0.10 - 0.90 10*3/mm3 Final   • Eosinophils, Absolute 10/07/2022 0.25  0.00 - 0.40 10*3/mm3 Final   • Basophils, Absolute 10/07/2022 0.07  0.00 - 0.20 10*3/mm3 Final   • Immature Grans, Absolute 10/07/2022 0.06 (H)  0.00 - 0.05 10*3/mm3 Final   • nRBC 10/07/2022 0.0  0.0 - 0.2 /100  WBC Final   • Hepatitis C Ab 10/07/2022 Non-Reactive  Non-Reactive Final       ASSESSMENT/ PLAN:    Diagnoses and all orders for this visit:    1. Hypertension, essential (Primary)  Assessment & Plan:  His blood pressure at home is consistently much better than here.  We will continue his current meds and update his BMP    Orders:  -     Basic Metabolic Panel  -     CBC (No Diff)    2. Pulmonary embolism, unspecified chronicity, unspecified pulmonary embolism type, unspecified whether acute cor pulmonale present  Assessment & Plan:  Is doing well without any recurrent signs or symptoms of recurrent thromboembolic events.  He will continue his Eliquis      3. PMR (polymyalgia rheumatica)  Assessment & Plan:  Currently he remains symptom-free on 4 mg of prednisone a day.      4. Recurrent major depressive disorder, in full remission  Assessment & Plan:  He is doing well with his current medication.        Orders Placed Today:     No orders of the defined types were placed in this encounter.       Management Plan:     An After Visit Summary was printed and given to the patient at discharge.    Follow-up: Return in about 6 months (around 10/11/2023) for Recheck.    Tanvir Garcia DO 4/11/2023 15:32 EDT  This note was electronically signed.

## 2023-04-12 ENCOUNTER — APPOINTMENT (OUTPATIENT)
Dept: GENERAL RADIOLOGY | Facility: HOSPITAL | Age: 78
End: 2023-04-12
Payer: MEDICARE

## 2023-04-12 ENCOUNTER — APPOINTMENT (OUTPATIENT)
Dept: CT IMAGING | Facility: HOSPITAL | Age: 78
End: 2023-04-12
Payer: MEDICARE

## 2023-04-12 ENCOUNTER — HOSPITAL ENCOUNTER (EMERGENCY)
Facility: HOSPITAL | Age: 78
Discharge: HOME | End: 2023-04-12
Attending: EMERGENCY MEDICINE | Admitting: EMERGENCY MEDICINE
Payer: MEDICARE

## 2023-04-12 VITALS
OXYGEN SATURATION: 100 % | TEMPERATURE: 98.5 F | HEART RATE: 65 BPM | SYSTOLIC BLOOD PRESSURE: 135 MMHG | RESPIRATION RATE: 18 BRPM | WEIGHT: 194.2 LBS | DIASTOLIC BLOOD PRESSURE: 70 MMHG | BODY MASS INDEX: 23.64 KG/M2

## 2023-04-12 DIAGNOSIS — M25.462 EFFUSION OF LEFT KNEE: ICD-10-CM

## 2023-04-12 DIAGNOSIS — S93.402A SPRAIN OF LEFT ANKLE, UNSPECIFIED LIGAMENT, INITIAL ENCOUNTER: Primary | ICD-10-CM

## 2023-04-12 DIAGNOSIS — S51.801A AVULSION OF SKIN OF FOREARM, RIGHT, INITIAL ENCOUNTER: ICD-10-CM
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
## 2023-04-12 PROCEDURE — 73562 X-RAY EXAM OF KNEE 3: CPT

## 2023-04-12 PROCEDURE — 73650 X-RAY EXAM OF HEEL: CPT

## 2023-04-12 PROCEDURE — 73610 X-RAY EXAM OF ANKLE: CPT

## 2023-04-12 PROCEDURE — 70450 CT HEAD/BRAIN W/O DYE: CPT

## 2023-04-12 PROCEDURE — 99283 EMERGENCY DEPT VISIT LOW MDM: CPT

## 2023-04-12 RX ORDER — HYDROCODONE BITARTRATE AND ACETAMINOPHEN 5; 325 MG/1; MG/1
1 TABLET ORAL EVERY 6 HOURS PRN
Status: DISCONTINUED | OUTPATIENT
Start: 2023-04-12 | End: 2023-04-13 | Stop reason: HOSPADM

## 2023-04-12 RX ADMIN — HYDROCODONE BITARTRATE AND ACETAMINOPHEN 1 TABLET: 5; 325 TABLET ORAL at 21:34

## 2023-04-12 NOTE — ED PROVIDER NOTES
Time: 7:14 PM EDT  Date of encounter:  4/12/2023  Independent Historian/Clinical History and Information was obtained by:   Patient  Chief Complaint   Patient presents with   • Fall     Left knee and foot pain    • skin tear     Right arm        History is limited by: N/A    History of Present Illness:  Patient is a 77 y.o. year old male who presents to the emergency department for evaluation of skin tear, left foot and knee pain.  Missed the bottom 2 steps at home of fell with left foot going behind him.  He states that he has chronic pain of the heel but now having worse pain at the heel. Has skin tear to right forearm. Did not hit head, no loc, tetanus is within last 5 years. Is on blood thinners. (Pardeep Camacho PA-C provider in triage 7:16 PM EDT )     The patient presents to the emergency department and states that he tripped because of his dog and missed the last 2 steps falling down.  He states that his legs, folded up underneath him.  He states that he has chronic left heel pain but it is much worse since his fall.  He is also complaining of left knee pain does have obvious swelling with what appears to be an effusion to the suprapatellar area.  He denies any posterior pain.  He has no obvious bruising.  He states that he is unable to ambulate due to the pain in his ankle and foot.  He is neurovascular intact.  There is no obvious deformities and only mild swelling to the ankle.  He denies any neck or back pain or tenderness.  He states that he did not hit his head or have any loss of consciousness but is on a blood thinner.  The patient does have a half dollar size skin avulsion to the right forearm for which bleeding is controlled.  The patient is neurovascular intact.  He reports that he is up-to-date with his tetanus.      History provided by:  Patient   used: No        Patient Care Team  Primary Care Provider: Tanvir Garcia DO    Past Medical History:     Allergies    Allergen Reactions   • Codeine GI Intolerance and Nausea Only     Sick at stomach     Past Medical History:   Diagnosis Date   • Anxiety    • Arthritis    • Blood clotting disorder 2017    6 yrs ago pt had blood clot TJ   • Broken bones    • Coagulation disorder    • Deep vein thrombosis    • Depression    • Hemorrhoid    • Nephrolithiasis    • PMR (polymyalgia rheumatica)    • Pulmonary embolism      Past Surgical History:   Procedure Laterality Date   • ARTHROPLASTY      Artificial Joints/Limbs   • BACK SURGERY      Lumbar   • COLONOSCOPY     • ESOPHAGUS SURGERY      stretching   • FEMUR FRACTURE SURGERY Left 2019   • HERNIA REPAIR     • HIP FRACTURE SURGERY Left    • KNEE SURGERY Right    • NECK SURGERY     • SHOULDER SURGERY Left 2000    Left Tear     Family History   Problem Relation Age of Onset   • Arthritis Mother    • Leukemia Mother    • Heart disease Brother        Home Medications:  Prior to Admission medications    Medication Sig Start Date End Date Taking? Authorizing Provider   Eliquis 5 MG tablet tablet TAKE ONE TABLET BY MOUTH TWICE A DAY 22   Tanvir Garcia DO   lisinopril (PRINIVIL,ZESTRIL) 20 MG tablet TAKE ONE TABLET BY MOUTH DAILY 23   Tanvir Garcia DO   metoprolol succinate XL (TOPROL-XL) 50 MG 24 hr tablet TAKE ONE TABLET BY MOUTH DAILY 22   Tanvir Garcia DO   Multiple Vitamin (MULTI-VITAMIN DAILY PO) Take 1 tablet by mouth Daily.    Provider, MD Eva   predniSONE (DELTASONE) 1 MG tablet TAKE ONE TABLET BY MOUTH FOUR TIMES A DAY 23   Tanvir Garcia DO   sertraline (ZOLOFT) 25 MG tablet TAKE ONE TABLET BY MOUTH DAILY 22   Tanvir Garcia, DO        Social History:   Social History     Tobacco Use   • Smoking status: Former     Packs/day: 1.00     Types: Cigarettes     Start date: 1965     Quit date: 1972     Years since quittin.3   • Smokeless tobacco: Never   • Tobacco  comments:     Quit 46 years ago   Vaping Use   • Vaping Use: Never used   Substance Use Topics   • Alcohol use: Not Currently   • Drug use: Never         Review of Systems:  Review of Systems   Constitutional: Negative for chills and fever.   HENT: Negative for congestion, ear pain and sore throat.    Eyes: Negative for pain.   Respiratory: Negative for cough, chest tightness and shortness of breath.    Cardiovascular: Negative for chest pain.   Gastrointestinal: Negative for abdominal pain, diarrhea, nausea and vomiting.   Genitourinary: Negative for flank pain and hematuria.   Musculoskeletal: Positive for arthralgias, gait problem and joint swelling. Negative for back pain, neck pain and neck stiffness.   Skin: Positive for wound (skin tear). Negative for pallor.   Neurological: Negative for seizures and headaches.   All other systems reviewed and are negative.       Physical Exam:  /70 (BP Location: Left arm, Patient Position: Sitting)   Pulse 65   Temp 98.5 °F (36.9 °C) (Oral)   Resp 18   Wt 88.1 kg (194 lb 3.2 oz)   SpO2 100%   BMI 23.64 kg/m²     Physical Exam  Vitals and nursing note reviewed.   Constitutional:       General: He is not in acute distress.     Appearance: Normal appearance. He is not ill-appearing or toxic-appearing.   HENT:      Head: Normocephalic and atraumatic.   Eyes:      General: No scleral icterus.     Conjunctiva/sclera: Conjunctivae normal.      Pupils: Pupils are equal, round, and reactive to light.   Cardiovascular:      Rate and Rhythm: Normal rate and regular rhythm.      Pulses: Normal pulses.   Pulmonary:      Effort: Pulmonary effort is normal. No respiratory distress.      Breath sounds: Normal breath sounds. No wheezing.   Abdominal:      General: Abdomen is flat. There is no distension.      Palpations: Abdomen is soft.      Tenderness: There is no abdominal tenderness. There is no guarding or rebound.   Musculoskeletal:         General: Swelling, tenderness and  signs of injury present. Normal range of motion.      Cervical back: Normal range of motion and neck supple. No rigidity or tenderness.      Right lower leg: No edema.      Left lower leg: No edema.   Lymphadenopathy:      Cervical: No cervical adenopathy.   Skin:     General: Skin is warm and dry.      Capillary Refill: Capillary refill takes less than 2 seconds.      Coloration: Skin is not cyanotic.      Findings: No rash.      Comments: Skin tear right forearm   Neurological:      General: No focal deficit present.      Mental Status: He is alert and oriented to person, place, and time. Mental status is at baseline.   Psychiatric:         Attention and Perception: Attention and perception normal.         Mood and Affect: Mood normal.         Behavior: Behavior normal.                  Procedures:  Procedures      Medical Decision Making:      Comorbidities that affect care:    Arthritis, hemorrhoids, pulmonary embolism, coagulation disorder, anxiety, depression, kidney stones, and a DVT    External Notes reviewed:    None      The following orders were placed and all results were independently analyzed by me:  Orders Placed This Encounter   Procedures   • [facility] 08.  CAM Boot   • XR Calcaneus 2+ View Left   • XR Ankle 3+ View Left   • XR Knee 3 View Left   • CT Head Without Contrast   • Obtain & Apply The Following- Lower extremity; Walking boot       Medications Given in the Emergency Department:  Medications - No data to display     ED Course:    The patient was initially evaluated in the triage area where orders were placed. The patient was later dispositioned by EMERSON Weir.      The patient was advised to stay for completion of workup which includes but is not limited to communication of labs and radiological results, reassessment and plan. The patient was advised that leaving prior to disposition by a provider could result in critical findings that are not communicated to the  patient.          Labs:    Lab Results (last 24 hours)     ** No results found for the last 24 hours. **           Imaging:    XR Knee 3 View Left    Result Date: 4/12/2023  PROCEDURE: XR KNEE 3 VW LEFT  COMPARISON: [facility] , CR, XR KNEE 3 VW LEFT, 8/02/2022, 9:32.  INDICATIONS: Patient fell going down a few steps. Pain on left knee.  FINDINGS:  There is no acute fracture or dislocation.  There is tricompartmental degenerative joint disease most notably within the lateral compartment.  There is likely chondrocalcinosis within the medial and lateral compartment of the knee.  There is distal quadriceps enthesophyte formation and partial ossification of the patellar tendon.  There is a small joint effusion.  Bone mineralization is normal.  There is peripheral vascular atherosclerotic calcification.        1. No acute fracture or dislocation. 2. Moderate to severe tricompartmental degenerative joint disease. 3. Small joint effusion.      DOUGIE MILIAN MD       Electronically Signed and Approved By: DOUGIE MILIAN MD on 4/12/2023 at 20:23             XR Ankle 3+ View Left    Result Date: 4/12/2023  PROCEDURE: XR ANKLE 3+ VW LEFT  COMPARISON: [facility], CR, XR CALCANEUS 2+ VW LEFT, 4/12/2023, 19:43.  INDICATIONS: Patient fell going down a few steps. Pain on left ankle.  FINDINGS:  There is no acute fracture or dislocation.  The ankle mortise and talar dome appear intact.  There is a well corticated osseous fragment adjacent to the tip of the lateral malleolus likely related to old trauma.  The tibiotalar and subtalar joints appear in normal alignment.  There is peripheral vascular atherosclerotic calcification.  There is mild anterior soft tissue swelling.        1. No acute osseous abnormality of the right ankle. 2. Evidence of prior trauma with well corticated osseous fragment adjacent to the tip of the lateral malleolus. 3. Mild anterior soft tissue swelling.       DOUGIE MILIAN MD        Electronically Signed and Approved By: DOUGIE MILIAN MD on 4/12/2023 at 20:27             XR Calcaneus 2+ View Left    Result Date: 4/12/2023  PROCEDURE: XR CALCANEUS 2+ VW LEFT  COMPARISON: 4/12/2023.  INDICATIONS: 77-year-old male who fell w/ pain in left heel; trauma/injury; initial encounter.  FINDINGS: 3 views were obtained.  No acute fracture or acute malalignment is identified.  Degenerative and enthesopathic changes involve the left foot and the left ankle.  Arterial calcifications are seen.  No retained radiopaque foreign body.  No subcutaneous emphysema.  If symptoms or clinical concerns persist, consider imaging follow-up.       No acute fracture or acute malalignment is identified.     Please note that portions of this note were completed with a voice recognition program.  MACEY LIND JR, MD       Electronically Signed and Approved By: MACEY LIND JR, MD on 4/12/2023 at 21:09              CT Head Without Contrast    Result Date: 4/12/2023  PROCEDURE: CT HEAD WO CONTRAST  COMPARISON: 4/17/2015.  INDICATIONS: fall  PROTOCOL:   Standard CT imaging protocol performed.    RADIATION:   Total DLP: 999 mGy*cm   MA and/or KV were/was adjusted to minimize radiation dose.    TECHNIQUE: After obtaining the patient's consent, 128 CT images were obtained without non-ionic intravenous contrast material.  DISCUSSION: A routine nonenhanced head CT was performed. No acute brain abnormality is identified. No acute intracranial hemorrhage. No acute infarction. No acute skull fracture. No midline shift or acute intracranial mass effect is seen.  Mild chronic small vessel ischemia/infarction is suspected. There are arterial calcifications. The extra-axial spaces and the ventricular system are prominent.  The patient has undergone bilateral cataract extractions with intra-ocular lens implants.  Streak artifact from dental amalgam obscures detail.  Mild benign external auditory canal debris is suspected, especially  on the right.  Otherwise, no significant acute findings are seen with regard to the imaged orbits, paranasal sinuses, or middle ear clefts.       No acute brain abnormality is seen.    Please note that portions of this note were completed with a voice recognition program.  MACEY LIND JR, MD       Electronically Signed and Approved By: MACEY LIND JR, MD on 4/12/2023 at 21:46                  Differential Diagnosis and Discussion:      Extremity Pain: Differential diagnosis includes but is not limited to soft tissue sprain, tendonitis, tendon injury, dislocation, fracture, deep vein thrombosis, arterial insufficiency, osteoarthritis, bursitis, and ligamentous damage.  Joint Pain: Differential diagnosis includes but is not limited to polyarticular arthritis, gout, tendinitis, hemarthrosis, septic arthritis, rheumatoid arthritis, bursitis, degenerative joint disease, joint effusion, autoimmune disorder, trauma, and occult neoplasm.  Laceration: Laceration was evaluated for arterial injury, ligamentous damage, and other neurovascular injury.  Trauma:  Differential diagnosis considered but not limited to were subarachnoid hemorrhage, intracranial bleeding, pneumothorax, cardiac contusion, lung contusion, intra-abdominal bleeding, and compartment syndrome of any extremity or other significant traumatic pathology    All X-rays impressions were independently interpreted by me.    MDM  Number of Diagnoses or Management Options  Avulsion of skin of forearm, right, initial encounter: new and requires workup  Effusion of left knee: new and requires workup  Fall, initial encounter: minor  Sprain of left ankle, unspecified ligament, initial encounter: new and requires workup     Amount and/or Complexity of Data Reviewed  Tests in the radiology section of CPT®: reviewed         Patient Care Considerations:    NARCOTICS: I considered prescribing opiate pain medication as an outpatient, however Patient was offered narcotic  pain medications but declined.      Consultants/Shared Management Plan:    None    Social Determinants of Health:    Patient is independent, reliable, and has access to care.       Disposition and Care Coordination:    Discharged: The patient is suitable and stable for discharge with no need for consideration of observation or admission.    I have explained the patient´s condition, diagnoses and treatment plan based on the information available to me at this time. I have answered questions and addressed any concerns. The patient has a good  understanding of the patient´s diagnosis, condition, and treatment plan as can be expected at this point. The vital signs have been stable. The patient´s condition is stable and appropriate for discharge from the emergency department.      The patient will pursue further outpatient evaluation with the primary care physician or other designated or consulting physician as outlined in the discharge instructions. They are agreeable to this plan of care and follow-up instructions have been explained in detail. The patient has received these instructions in written format and have expressed an understanding of the discharge instructions. The patient is aware that any significant change in condition or worsening of symptoms should prompt an immediate return to this or the closest emergency department or call to 911.  I have explained discharge medications and the need for follow up with the patient/caretakers. This was also printed in the discharge instructions. Patient was discharged with the following medications and follow up:      Medication List      No changes were made to your prescriptions during this visit.      Dayan Rodriguez MD  [address]  [place] [address]  [phone]    Call   FOR FOLLOW UP    Tanvir Garcia, DO  [address] [facility] [address] [facility] [address]  [phone]      As needed       Final diagnoses:   Sprain of left ankle, unspecified  ligament, initial encounter   Fall, initial encounter   Avulsion of skin of forearm, right, initial encounter   Effusion of left knee        ED Disposition     ED Disposition   Discharge    Condition   Stable    Comment   --             This medical record created using voice recognition software.           Yaz Phipps, APRN  04/13/23 0741

## 2023-04-13 NOTE — DISCHARGE INSTRUCTIONS
Rest, ice, and elevate.  Wear your walking boot for ambulation and use your crutches that you have at home  You may take over-the-counter acetaminophen as needed for pain.  Watch for any signs of infection to your skin tear such as increased redness, drainage or swelling.  Call Dr. Rodriguez's office tomorrow to follow-up with him concerning your left ankle and knee and call Dr. Garcia's office tomorrow to follow-up with them on Monday to have them look at your skin tear to your arm.  Return to the emergency department for any acutely developing signs of infection, any discoloration of your ankle or foot or any new or worse concerns.

## 2023-04-21 ENCOUNTER — OFFICE VISIT (OUTPATIENT)
Dept: ORTHOPEDIC SURGERY | Facility: CLINIC | Age: 78
End: 2023-04-21
Payer: MEDICARE

## 2023-04-21 VITALS — HEIGHT: 76 IN | WEIGHT: 194 LBS | BODY MASS INDEX: 23.62 KG/M2

## 2023-04-21 DIAGNOSIS — S93.402A SPRAIN OF LEFT ANKLE, UNSPECIFIED LIGAMENT, INITIAL ENCOUNTER: ICD-10-CM

## 2023-04-21 DIAGNOSIS — S89.92XS KNEE INJURY, LEFT, SEQUELA: Primary | ICD-10-CM

## 2023-04-21 DIAGNOSIS — M19.079 ANKLE ARTHRITIS: ICD-10-CM

## 2023-04-21 DIAGNOSIS — M17.12 OSTEOARTHRITIS OF LEFT KNEE, UNSPECIFIED OSTEOARTHRITIS TYPE: ICD-10-CM

## 2023-04-21 NOTE — PROGRESS NOTES
"Chief Complaint  Initial Evaluation of the Left Knee and Initial Evaluation of the Left Ankle     Subjective      Walker Ordoñez presents to [facility] for initial evaluation of the left knee and the left ankle. He had a fall and missed the last 3 steps. He went to the ER and was placed in a CAM boot. The injury was on 23.       Allergies   Allergen Reactions   • Codeine GI Intolerance and Nausea Only     Sick at stomach        Social History     Socioeconomic History   • Marital status:    Tobacco Use   • Smoking status: Former     Packs/day: 1.00     Types: Cigarettes     Start date: 1965     Quit date: 1972     Years since quittin.3   • Smokeless tobacco: Never   • Tobacco comments:     Quit 46 years ago   Vaping Use   • Vaping Use: Never used   Substance and Sexual Activity   • Alcohol use: Not Currently   • Drug use: Never   • Sexual activity: Defer        Review of Systems     Objective   Vital Signs:   Ht 193 cm (76\")   Wt 88 kg (194 lb)   BMI 23.61 kg/m²       Physical Exam  Constitutional:       Appearance: Normal appearance. Patient is well-developed and normal weight.   HENT:      Head: Normocephalic.      Right Ear: Hearing and external ear normal.      Left Ear: Hearing and external ear normal.      Nose: Nose normal.   Eyes:      Conjunctiva/sclera: Conjunctivae normal.   Cardiovascular:      Rate and Rhythm: Normal rate.   Pulmonary:      Effort: Pulmonary effort is normal.      Breath sounds: No wheezing or rales.   Abdominal:      Palpations: Abdomen is soft.      Tenderness: There is no abdominal tenderness.   Musculoskeletal:      Cervical back: Normal range of motion.   Skin:     Findings: No rash.   Neurological:      Mental Status: Patient is alert and oriented to person, place, and time.   Psychiatric:         Mood and Affect: Mood and affect normal.         Judgment: Judgment normal.       Ortho Exam      LEFT KNEE Flexion 120. Extension " 0. Stable to varus/valgus stress. Stable to anterior/posterior drawer. Neurovascularly intact. Negative Jeanette. Negative Lachman. Positive EHL, FHL, HS and TA. Sensation intact to light touch all 5 nerves of the foot. Ambulates with Antalgic gait. Patella is well tracking. Calf supple, non-tender. Positive tenderness to the medial joint line. Positive tenderness to the lateral joint line. Positive Crepitus. Good strength to hamstrings, quadriceps, dorsiflexors, and plantar flexors.  Knee Extensor Mechanism intact    LEFT ANKLE Positive EHL, FHL, GS and TA. Sensation intact to all 5 nerves of the foot. Positive pulses. Neurovascularly intact. Calf soft, Non-tender. Plantar flexion 20, dorsiflexion 10. Stable to stress. Intact flexion and extension of toes.         Procedures      Imaging Results (Most Recent)     None           Result Review :         XR Knee 3 View Left    Result Date: 4/12/2023  Narrative: PROCEDURE: XR KNEE 3 VW LEFT  COMPARISON: [facility], XR KNEE 3 VW LEFT, 8/02/2022, 9:32.  INDICATIONS: Patient fell going down a few steps. Pain on left knee.  FINDINGS:  There is no acute fracture or dislocation.  There is tricompartmental degenerative joint disease most notably within the lateral compartment.  There is likely chondrocalcinosis within the medial and lateral compartment of the knee.  There is distal quadriceps enthesophyte formation and partial ossification of the patellar tendon.  There is a small joint effusion.  Bone mineralization is normal.  There is peripheral vascular atherosclerotic calcification.      Impression:   1. No acute fracture or dislocation. 2. Moderate to severe tricompartmental degenerative joint disease. 3. Small joint effusion.      DOUGIE MILIAN MD       Electronically Signed and Approved By: DOUGIE MILIAN MD on 4/12/2023 at 20:23             XR Ankle 3+ View Left    Result Date: 4/12/2023  Narrative: PROCEDURE: XR ANKLE 3+ VW LEFT  COMPARISON: [facility], CR, XR CALCANEUS 2+ VW LEFT, 4/12/2023, 19:43.  INDICATIONS: Patient fell going down a few steps. Pain on left ankle.  FINDINGS:  There is no acute fracture or dislocation.  The ankle mortise and talar dome appear intact.  There is a well corticated osseous fragment adjacent to the tip of the lateral malleolus likely related to old trauma.  The tibiotalar and subtalar joints appear in normal alignment.  There is peripheral vascular atherosclerotic calcification.  There is mild anterior soft tissue swelling.      Impression:   1. No acute osseous abnormality of the right ankle. 2. Evidence of prior trauma with well corticated osseous fragment adjacent to the tip of the lateral malleolus. 3. Mild anterior soft tissue swelling.       DOUGIE MILIAN MD       Electronically Signed and Approved By: DOUGIE MILIAN MD on 4/12/2023 at 20:27             XR Calcaneus 2+ View Left    Result Date: 4/12/2023  Narrative: PROCEDURE: XR CALCANEUS 2+ VW LEFT  COMPARISON: 4/12/2023.  INDICATIONS: 77-year-old male who fell w/ pain in left heel; trauma/injury; initial encounter.  FINDINGS: 3 views were obtained.  No acute fracture or acute malalignment is identified.  Degenerative and enthesopathic changes involve the left foot and the left ankle.  Arterial calcifications are seen.  No retained radiopaque foreign body.  No subcutaneous emphysema.  If symptoms or clinical concerns persist, consider imaging follow-up.      Impression:  No acute fracture or acute malalignment is identified.     Please note that portions of this note were completed with a voice recognition program.  MACEY LIND JR, MD       Electronically Signed and Approved By: MACEY LIND JR, MD on 4/12/2023 at 21:09              CT Head Without Contrast    Result Date: 4/12/2023  Narrative: PROCEDURE: CT HEAD WO CONTRAST  COMPARISON: 4/17/2015.  INDICATIONS: fall  PROTOCOL:   Standard CT imaging protocol performed.    RADIATION:   Total DLP:  999 mGy*cm   MA and/or KV were/was adjusted to minimize radiation dose.    TECHNIQUE: After obtaining the patient's consent, 128 CT images were obtained without non-ionic intravenous contrast material.  DISCUSSION: A routine nonenhanced head CT was performed. No acute brain abnormality is identified. No acute intracranial hemorrhage. No acute infarction. No acute skull fracture. No midline shift or acute intracranial mass effect is seen.  Mild chronic small vessel ischemia/infarction is suspected. There are arterial calcifications. The extra-axial spaces and the ventricular system are prominent.  The patient has undergone bilateral cataract extractions with intra-ocular lens implants.  Streak artifact from dental amalgam obscures detail.  Mild benign external auditory canal debris is suspected, especially on the right.  Otherwise, no significant acute findings are seen with regard to the imaged orbits, paranasal sinuses, or middle ear clefts.      Impression:  No acute brain abnormality is seen.    Please note that portions of this note were completed with a voice recognition program.  MACEY LIND JR, MD       Electronically Signed and Approved By: MACEY LIND JR, MD on 4/12/2023 at 21:46                       Assessment and Plan     Diagnoses and all orders for this visit:    1. Knee injury, left, sequela (Primary)    2. Osteoarthritis of left knee, unspecified osteoarthritis type    3. Sprain of left ankle, unspecified ligament, initial encounter        Discussed the treatment plan with the patient. I reviewed the X-rays that were obtained 4/12/23 with the patient. HEP exercises for the ankle.  Prescribed Voltaren gel.     Call or return if worsening symptoms.    Follow Up     PRN      Patient was given instructions and counseling regarding his condition or for health maintenance advice. Please see specific information pulled into the AVS if appropriate.     Scribed for Dayan Rodriguez MD by Sadie Hartmann,  MA.  04/21/23   08:41 EDT    I have personally performed the services described in this document as scribed by the above individual and it is both accurate and complete. Dayan Rodriguez MD 04/21/23

## 2023-04-27 ENCOUNTER — OFFICE VISIT (OUTPATIENT)
Dept: FAMILY MEDICINE CLINIC | Facility: CLINIC | Age: 78
End: 2023-04-27
Payer: MEDICARE

## 2023-04-27 VITALS
DIASTOLIC BLOOD PRESSURE: 73 MMHG | SYSTOLIC BLOOD PRESSURE: 145 MMHG | OXYGEN SATURATION: 100 % | HEIGHT: 76 IN | BODY MASS INDEX: 23.62 KG/M2 | TEMPERATURE: 98 F | WEIGHT: 194 LBS | HEART RATE: 64 BPM

## 2023-04-27 DIAGNOSIS — S93.432A SPRAIN OF TIBIOFIBULAR LIGAMENT OF LEFT ANKLE, INITIAL ENCOUNTER: ICD-10-CM

## 2023-04-27 DIAGNOSIS — S51.011A SKIN TEAR OF RIGHT ELBOW WITHOUT COMPLICATION, INITIAL ENCOUNTER: ICD-10-CM
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
## 2023-04-27 NOTE — ASSESSMENT & PLAN NOTE
His fall was with cause.  He missed the last couple steps and fell down his stairs.  Causing his apparent injuries

## 2023-04-27 NOTE — ASSESSMENT & PLAN NOTE
This is healing fine.  Instructed him that it will heal slowly over time and he may have more scarring than a usual wound.  He will continue to monitor for any signs or symptoms of infection.

## 2023-04-27 NOTE — ASSESSMENT & PLAN NOTE
His x-rays did not reveal any fractures.  This is probably more of a sprain strain type of injury.  His ankle has very little to no range of motion due to advanced arthritis of the ankle he will mortise.  He is already seen Ortho instructed to wear his boot for another week and then start to wean himself off and increase his physical activity.

## 2023-04-27 NOTE — PROGRESS NOTES
Chief Complaint   Patient presents with   • Follow-up     ER / fall        Subjective     Walker Ordoñez  has a past medical history of Anxiety, Arthritis, Blood clotting disorder (04/27/2017), Broken bones, Coagulation disorder, Deep vein thrombosis, Depression, Hemorrhoid, Nephrolithiasis, PMR (polymyalgia rheumatica), and Pulmonary embolism.    ER follow-up- he fell going out his steps from his home into his garage.  He missed the last couple steps and fell down.  In doing so his left leg got behind and underneath him and scraped up his right forearm.  He was having quite a bit of pain and went to the emergency room for evaluation.  There they did x-rays of his left knee foot and ankle.  The x-rays were only remarkable for significant arthritis of his left knee.  He did not have any fractures.  Due to him also being on blood thinners I did a CT of his head that did not show any acute bleeds.  Since his ER visit he saw Dr. Rodriguez about 6 days ago.  He has been kept in his walking boot at all weightbearing activities.  He is to wear his boot for another week and then may remove it and then follow-up with his any persistent issues.    Skin tear-in his fall he got a skin tear on his right forearm.  They did apply some Steri-Strips which have already come off.  He has not had any redness warmth or tenderness or drainage.      PHQ-2 Depression Screening  Little interest or pleasure in doing things?     Feeling down, depressed, or hopeless?     PHQ-2 Total Score     PHQ-9 Depression Screening  Little interest or pleasure in doing things?     Feeling down, depressed, or hopeless?     Trouble falling or staying asleep, or sleeping too much?     Feeling tired or having little energy?     Poor appetite or overeating?     Feeling bad about yourself - or that you are a failure or have let yourself or your family down?     Trouble concentrating on things, such as reading the newspaper or watching television?     Moving or  speaking so slowly that other people could have noticed? Or the opposite - being so fidgety or restless that you have been moving around a lot more than usual?     Thoughts that you would be better off dead, or of hurting yourself in some way?     PHQ-9 Total Score     If you checked off any problems, how difficult have these problems made it for you to do your work, take care of things at home, or get along with other people?       Allergies   Allergen Reactions   • Codeine GI Intolerance and Nausea Only     Sick at stomach       Prior to Admission medications    Medication Sig Start Date End Date Taking? Authorizing Provider   Diclofenac Sodium (VOLTAREN) 1 % gel gel Apply 4 g topically to the appropriate area as directed 4 (Four) Times a Day. 4/21/23  Yes Dayan Rodriguez MD   Eliquis 5 MG tablet tablet TAKE ONE TABLET BY MOUTH TWICE A DAY 12/5/22  Yes Tanvir Garcia DO   lisinopril (PRINIVIL,ZESTRIL) 20 MG tablet TAKE ONE TABLET BY MOUTH DAILY 1/16/23  Yes Tanvir Garcia DO   metoprolol succinate XL (TOPROL-XL) 50 MG 24 hr tablet TAKE ONE TABLET BY MOUTH DAILY 11/21/22  Yes Tanvir Garcia DO   Multiple Vitamin (MULTI-VITAMIN DAILY PO) Take 1 tablet by mouth Daily.   Yes Provider, MD Eva   predniSONE (DELTASONE) 1 MG tablet TAKE ONE TABLET BY MOUTH FOUR TIMES A DAY 1/16/23  Yes Tanvir Garcia DO   sertraline (ZOLOFT) 25 MG tablet TAKE ONE TABLET BY MOUTH DAILY 11/21/22  Yes Tanvir Garcia DO        Patient Active Problem List   Diagnosis   • Long term current use of systemic steroids   • Hypertension, essential   • PMR (polymyalgia rheumatica)   • Depression   • Pulmonary embolism   • Pneumonia   • Hospital discharge follow-up   • Anxiety   • Arthritis   • Broken bones   • Coagulation disorder   • Hemorrhoid   • Kidney stone   • Nephrolithiasis   • Cough   • Primary osteoarthritis of left knee   • Fall (on) (from) other stairs and steps, initial encounter  "  • Sprain of tibiofibular ligament of left ankle   • Skin tear of right elbow without complication        Past Surgical History:   Procedure Laterality Date   • ARTHROPLASTY      Artificial Joints/Limbs   • BACK SURGERY      Lumbar   • COLONOSCOPY     • ESOPHAGUS SURGERY      stretching   • FEMUR FRACTURE SURGERY Left    • HERNIA REPAIR     • HIP FRACTURE SURGERY Left 2019   • KNEE SURGERY Right 2005   • NECK SURGERY     • SHOULDER SURGERY Left 2000    Left Tear       Social History     Socioeconomic History   • Marital status:    Tobacco Use   • Smoking status: Former     Packs/day: 1.00     Types: Cigarettes     Start date: 1965     Quit date: 1972     Years since quittin.3   • Smokeless tobacco: Never   • Tobacco comments:     Quit 46 years ago   Vaping Use   • Vaping Use: Never used   Substance and Sexual Activity   • Alcohol use: Not Currently   • Drug use: Never   • Sexual activity: Defer       Family History   Problem Relation Age of Onset   • Arthritis Mother    • Leukemia Mother    • Heart disease Brother        Family history, surgical history, past medical history, Allergies and meds reviewed with patient today and updated in [employer] EMR.     ROS:  Review of Systems   Constitutional: Negative for fatigue.   Musculoskeletal: Negative for arthralgias and joint swelling.   Skin: Positive for wound.       OBJECTIVE:  Vitals:    23 1548   BP: 145/73   BP Location: Left arm   Patient Position: Sitting   Pulse: 64   Temp: 98 °F (36.7 °C)   SpO2: 100%   Weight: 88 kg (194 lb)   Height: 193 cm (76\")     No results found.   Body mass index is 23.61 kg/m².  No LMP for male patient.    Physical Exam  Vitals and nursing note reviewed.   Constitutional:       General: He is not in acute distress.     Appearance: Normal appearance. He is normal weight.   HENT:      Head: Normocephalic.   Cardiovascular:      Rate and Rhythm: Normal rate.      Heart sounds: Murmur heard.    Systolic murmur " is present with a grade of 1/6.  Pulmonary:      Effort: Pulmonary effort is normal.      Breath sounds: Normal breath sounds. No wheezing.   Musculoskeletal:      Left ankle: No swelling or deformity. No tenderness. Decreased range of motion. Anterior drawer test negative. Normal pulse.   Skin:            Comments: Skin tear   Neurological:      Mental Status: He is alert.         Procedures    Office Visit on 04/11/2023   Component Date Value Ref Range Status   • Glucose 04/11/2023 93  65 - 99 mg/dL Final   • BUN 04/11/2023 15  8 - 23 mg/dL Final   • Creatinine 04/11/2023 0.98  0.76 - 1.27 mg/dL Final   • Sodium 04/11/2023 141  136 - 145 mmol/L Final   • Potassium 04/11/2023 4.5  3.5 - 5.2 mmol/L Final   • Chloride 04/11/2023 102  98 - 107 mmol/L Final   • CO2 04/11/2023 28.5  22.0 - 29.0 mmol/L Final   • Calcium 04/11/2023 9.7  8.6 - 10.5 mg/dL Final   • BUN/Creatinine Ratio 04/11/2023 15.3  7.0 - 25.0 Final   • Anion Gap 04/11/2023 10.5  5.0 - 15.0 mmol/L Final   • eGFR 04/11/2023 78.4  >60.0 mL/min/1.73 Final   • WBC 04/11/2023 9.13  3.40 - 10.80 10*3/mm3 Final   • RBC 04/11/2023 4.56  4.14 - 5.80 10*6/mm3 Final   • Hemoglobin 04/11/2023 15.4  13.0 - 17.7 g/dL Final   • Hematocrit 04/11/2023 44.2  37.5 - 51.0 % Final   • MCV 04/11/2023 96.9  79.0 - 97.0 fL Final   • MCH 04/11/2023 33.8 (H)  26.6 - 33.0 pg Final   • MCHC 04/11/2023 34.8  31.5 - 35.7 g/dL Final   • RDW 04/11/2023 12.9  12.3 - 15.4 % Final   • RDW-SD 04/11/2023 45.9  37.0 - 54.0 fl Final   • MPV 04/11/2023 10.1  6.0 - 12.0 fL Final   • Platelets 04/11/2023 307  140 - 450 10*3/mm3 Final       ASSESSMENT/ PLAN:    Diagnoses and all orders for this visit:    1. Fall (on) (from) other stairs and steps, initial encounter (Primary)  Assessment & Plan:  His fall was with cause.  He missed the last couple steps and fell down his stairs.  Causing his apparent injuries      2. Sprain of tibiofibular ligament of left ankle, initial encounter  Assessment &  Plan:  His x-rays did not reveal any fractures.  This is probably more of a sprain strain type of injury.  His ankle has very little to no range of motion due to advanced arthritis of the ankle he will mortise.  He is already seen Ortho instructed to wear his boot for another week and then start to wean himself off and increase his physical activity.      3. Skin tear of right elbow without complication, initial encounter  Assessment & Plan:  This is healing fine.  Instructed him that it will heal slowly over time and he may have more scarring than a usual wound.  He will continue to monitor for any signs or symptoms of infection.        Orders Placed Today:     No orders of the defined types were placed in this encounter.       Management Plan:     An After Visit Summary was printed and given to the patient at discharge.    Follow-up: Return for Next scheduled follow up.    Tanvir Garcia DO 4/27/2023 16:37 EDT  This note was electronically signed.

## 2023-05-19 RX ORDER — METOPROLOL SUCCINATE 50 MG/1
TABLET, EXTENDED RELEASE ORAL
Qty: 90 TABLET | Refills: 1 | Status: SHIPPED | OUTPATIENT
Start: 2023-05-19

## 2023-05-19 RX ORDER — SERTRALINE HYDROCHLORIDE 25 MG/1
TABLET, FILM COATED ORAL
Qty: 90 TABLET | Refills: 1 | Status: SHIPPED | OUTPATIENT
Start: 2023-05-19

## 2023-08-07 ENCOUNTER — OFFICE VISIT (OUTPATIENT)
Dept: FAMILY MEDICINE CLINIC | Facility: CLINIC | Age: 78
End: 2023-08-07
Payer: MEDICARE

## 2023-08-07 VITALS
DIASTOLIC BLOOD PRESSURE: 68 MMHG | OXYGEN SATURATION: 97 % | HEIGHT: 76 IN | SYSTOLIC BLOOD PRESSURE: 154 MMHG | TEMPERATURE: 97.1 F | HEART RATE: 67 BPM | WEIGHT: 186 LBS | BODY MASS INDEX: 22.65 KG/M2

## 2023-08-07 DIAGNOSIS — I26.99 PULMONARY EMBOLISM, UNSPECIFIED CHRONICITY, UNSPECIFIED PULMONARY EMBOLISM TYPE, UNSPECIFIED WHETHER ACUTE COR PULMONALE PRESENT: ICD-10-CM

## 2023-08-07 DIAGNOSIS — R42 DIZZINESS: ICD-10-CM

## 2023-08-07 DIAGNOSIS — I10 HYPERTENSION, ESSENTIAL: Primary | ICD-10-CM

## 2023-08-07 LAB
ANION GAP SERPL CALCULATED.3IONS-SCNC: 11.7 MMOL/L (ref 5–15)
BUN SERPL-MCNC: 16 MG/DL (ref 8–23)
BUN/CREAT SERPL: 15.5 (ref 7–25)
CALCIUM SPEC-SCNC: 9.9 MG/DL (ref 8.6–10.5)
CHLORIDE SERPL-SCNC: 105 MMOL/L (ref 98–107)
CO2 SERPL-SCNC: 26.3 MMOL/L (ref 22–29)
CREAT SERPL-MCNC: 1.03 MG/DL (ref 0.76–1.27)
DEPRECATED RDW RBC AUTO: 45.6 FL (ref 37–54)
EGFRCR SERPLBLD CKD-EPI 2021: 73.9 ML/MIN/1.73
ERYTHROCYTE [DISTWIDTH] IN BLOOD BY AUTOMATED COUNT: 12.8 % (ref 12.3–15.4)
GLUCOSE SERPL-MCNC: 79 MG/DL (ref 65–99)
HCT VFR BLD AUTO: 44.4 % (ref 37.5–51)
HGB BLD-MCNC: 15.4 G/DL (ref 13–17.7)
MCH RBC QN AUTO: 33.4 PG (ref 26.6–33)
MCHC RBC AUTO-ENTMCNC: 34.7 G/DL (ref 31.5–35.7)
MCV RBC AUTO: 96.3 FL (ref 79–97)
PLATELET # BLD AUTO: 291 10*3/MM3 (ref 140–450)
PMV BLD AUTO: 9.8 FL (ref 6–12)
POTASSIUM SERPL-SCNC: 4.2 MMOL/L (ref 3.5–5.2)
RBC # BLD AUTO: 4.61 10*6/MM3 (ref 4.14–5.8)
SODIUM SERPL-SCNC: 143 MMOL/L (ref 136–145)
WBC NRBC COR # BLD: 7.86 10*3/MM3 (ref 3.4–10.8)

## 2023-08-07 PROCEDURE — 36415 COLL VENOUS BLD VENIPUNCTURE: CPT | Performed by: FAMILY MEDICINE

## 2023-08-07 PROCEDURE — 99213 OFFICE O/P EST LOW 20 MIN: CPT | Performed by: FAMILY MEDICINE

## 2023-08-07 PROCEDURE — 80048 BASIC METABOLIC PNL TOTAL CA: CPT | Performed by: FAMILY MEDICINE

## 2023-08-07 PROCEDURE — 85027 COMPLETE CBC AUTOMATED: CPT | Performed by: FAMILY MEDICINE

## 2023-08-07 PROCEDURE — 3078F DIAST BP <80 MM HG: CPT | Performed by: FAMILY MEDICINE

## 2023-08-07 PROCEDURE — 3077F SYST BP >= 140 MM HG: CPT | Performed by: FAMILY MEDICINE

## 2023-08-07 NOTE — ASSESSMENT & PLAN NOTE
His dizziness does not sound vertiginous.  He is more ataxic and lightheaded.  We will check labs and orthostatic vitals.  His vital signs are not grossly abnormal but they do drop from laying to sitting.  He does not have much of a pulse rate most probably due to his beta-blocker.  We encouraged him to hydrate better and will await his labs.

## 2023-08-07 NOTE — PROGRESS NOTES
Chief Complaint   Patient presents with    Dizziness        Subjective     Walker Ordoñez  has a past medical history of Anxiety, Arthritis, Blood clotting disorder (04/27/2017), Broken bones, Coagulation disorder, Deep vein thrombosis, Depression, Hemorrhoid, Nephrolithiasis, PMR (polymyalgia rheumatica), and Pulmonary embolism.    Dizziness-patient complains of dizziness that started about 3 weeks ago.  Describes it as he needs to sit down or he will fall over.  He states it feels more like a balance issue as it feels like he is off balance.  Patient states when he said he does not have this dizziness feeling, it is more when he is up and moving.  However if he remains busy he does not notice the dizziness.He denies actually falling over.  States that he has this dizziness all the time.  Was seen at urgent care on July 5 and was given 3 days worth of meclizine.  He took all of the meclizine with no relief.  Patient states also at urgent care they advised him to take his metoprolol at nighttime instead of in the morning, has not noticed a difference with the dizziness when doing this as well.  He has continued to check his blood pressures at home and they typically are 130s over 70s      PHQ-2 Depression Screening  Little interest or pleasure in doing things?     Feeling down, depressed, or hopeless?     PHQ-2 Total Score     PHQ-9 Depression Screening  Little interest or pleasure in doing things?     Feeling down, depressed, or hopeless?     Trouble falling or staying asleep, or sleeping too much?     Feeling tired or having little energy?     Poor appetite or overeating?     Feeling bad about yourself - or that you are a failure or have let yourself or your family down?     Trouble concentrating on things, such as reading the newspaper or watching television?     Moving or speaking so slowly that other people could have noticed? Or the opposite - being so fidgety or restless that you have been moving around a lot  more than usual?     Thoughts that you would be better off dead, or of hurting yourself in some way?     PHQ-9 Total Score     If you checked off any problems, how difficult have these problems made it for you to do your work, take care of things at home, or get along with other people?       Allergies   Allergen Reactions    Codeine GI Intolerance and Nausea Only     Sick at stomach       Prior to Admission medications    Medication Sig Start Date End Date Taking? Authorizing Provider   Eliquis 5 MG tablet tablet TAKE ONE TABLET BY MOUTH TWICE A DAY 12/5/22  Yes Tanvir Garcia DO   lisinopril (PRINIVIL,ZESTRIL) 20 MG tablet TAKE ONE TABLET BY MOUTH DAILY 7/12/23  Yes Tanvir Garcia DO   metoprolol succinate XL (TOPROL-XL) 50 MG 24 hr tablet TAKE ONE TABLET BY MOUTH DAILY 5/19/23  Yes Tanvir Garcia DO   Multiple Vitamin (MULTI-VITAMIN DAILY PO) Take 1 tablet by mouth Daily.   Yes Provider, MD Eva   predniSONE (DELTASONE) 1 MG tablet TAKE ONE TABLET BY MOUTH FOUR TIMES A DAY 7/17/23  Yes Tanvir Garcia DO   sertraline (ZOLOFT) 25 MG tablet TAKE ONE TABLET BY MOUTH DAILY 5/19/23  Yes Tanvir Garcia DO   meclizine (ANTIVERT) 25 MG tablet Take 1 tablet by mouth 3 (Three) Times a Day As Needed for Dizziness.  Patient not taking: Reported on 8/7/2023 7/5/23   Cheyenne Castillo APRN        Patient Active Problem List   Diagnosis    Long term current use of systemic steroids    Hypertension, essential    PMR (polymyalgia rheumatica)    Depression    Pulmonary embolism    Pneumonia    Hospital discharge follow-up    Anxiety    Arthritis    Broken bones    Coagulation disorder    Hemorrhoid    Kidney stone    Nephrolithiasis    Cough    Primary osteoarthritis of left knee    Fall (on) (from) other stairs and steps, initial encounter    Sprain of tibiofibular ligament of left ankle    Skin tear of right elbow without complication    Dizziness        Past Surgical  History:   Procedure Laterality Date    ARTHROPLASTY      Artificial Joints/Limbs    BACK SURGERY      Lumbar    COLONOSCOPY  2019    ESOPHAGUS SURGERY      stretching    FEMUR FRACTURE SURGERY Left 2019    HERNIA REPAIR      HIP FRACTURE SURGERY Left 2019    KNEE SURGERY Right 2005    NECK SURGERY      SHOULDER SURGERY Left 2000    Left Tear       Social History     Socioeconomic History    Marital status:    Tobacco Use    Smoking status: Former     Packs/day: 1.00     Types: Cigarettes     Start date: 1965     Quit date: 1972     Years since quittin.6     Passive exposure: Never    Smokeless tobacco: Never    Tobacco comments:     Quit 46 years ago   Vaping Use    Vaping Use: Never used   Substance and Sexual Activity    Alcohol use: Not Currently    Drug use: Never    Sexual activity: Defer       Family History   Problem Relation Age of Onset    Arthritis Mother     Leukemia Mother     Heart disease Brother        Family history, surgical history, past medical history, Allergies and meds reviewed with patient today and updated in [employer] EMR.     ROS:  Review of Systems   Constitutional:  Negative for fatigue.   HENT:  Positive for postnasal drip. Negative for congestion, rhinorrhea and trouble swallowing.    Eyes:  Negative for blurred vision and visual disturbance.   Respiratory:  Negative for cough, chest tightness, shortness of breath and wheezing.    Cardiovascular:  Negative for chest pain and palpitations.   Gastrointestinal:  Negative for nausea and vomiting.   Allergic/Immunologic: Positive for environmental allergies.   Neurological:  Positive for dizziness and light-headedness. Negative for weakness, numbness and headache.   Psychiatric/Behavioral:  Negative for sleep disturbance and depressed mood. The patient is not nervous/anxious.      OBJECTIVE:  Vitals:    23 0816   BP: 154/68   BP Location: Left arm   Patient Position: Sitting   Pulse: 67   Temp: 97.1 øF (36.2 øC)   SpO2:  "97%   Weight: 84.4 kg (186 lb)   Height: 193 cm (76\")     No results found.   Body mass index is 22.64 kg/mý.  No LMP for male patient.    Physical Exam  Vitals and nursing note reviewed.   Constitutional:       General: He is not in acute distress.     Appearance: Normal appearance. He is normal weight.   HENT:      Head: Normocephalic.      Right Ear: Tympanic membrane, ear canal and external ear normal.      Left Ear: Tympanic membrane, ear canal and external ear normal.      Nose: Nose normal.      Mouth/Throat:      Mouth: Mucous membranes are moist.      Pharynx: Oropharynx is clear.   Eyes:      General: No scleral icterus.     Conjunctiva/sclera: Conjunctivae normal.      Pupils: Pupils are equal, round, and reactive to light.   Neck:      Vascular: No carotid bruit.   Cardiovascular:      Rate and Rhythm: Normal rate and regular rhythm.      Pulses: Normal pulses.      Heart sounds: Normal heart sounds. Murmur heard.   Crescendo decrescendo murmur is present with a grade of 3/6.   Pulmonary:      Effort: Pulmonary effort is normal.      Breath sounds: Normal breath sounds. No wheezing, rhonchi or rales.   Musculoskeletal:      Right lower leg: No edema.      Left lower leg: No edema.   Skin:     General: Skin is warm and dry.      Coloration: Skin is not jaundiced.      Findings: No rash.   Neurological:      General: No focal deficit present.      Mental Status: He is alert and oriented to person, place, and time.   Psychiatric:         Mood and Affect: Mood normal.         Thought Content: Thought content normal.         Judgment: Judgment normal.       Procedures    No visits with results within 30 Day(s) from this visit.   Latest known visit with results is:   Office Visit on 04/11/2023   Component Date Value Ref Range Status    Glucose 04/11/2023 93  65 - 99 mg/dL Final    BUN 04/11/2023 15  8 - 23 mg/dL Final    Creatinine 04/11/2023 0.98  0.76 - 1.27 mg/dL Final    Sodium 04/11/2023 141  136 - 145 " mmol/L Final    Potassium 04/11/2023 4.5  3.5 - 5.2 mmol/L Final    Chloride 04/11/2023 102  98 - 107 mmol/L Final    CO2 04/11/2023 28.5  22.0 - 29.0 mmol/L Final    Calcium 04/11/2023 9.7  8.6 - 10.5 mg/dL Final    BUN/Creatinine Ratio 04/11/2023 15.3  7.0 - 25.0 Final    Anion Gap 04/11/2023 10.5  5.0 - 15.0 mmol/L Final    eGFR 04/11/2023 78.4  >60.0 mL/min/1.73 Final    WBC 04/11/2023 9.13  3.40 - 10.80 10*3/mm3 Final    RBC 04/11/2023 4.56  4.14 - 5.80 10*6/mm3 Final    Hemoglobin 04/11/2023 15.4  13.0 - 17.7 g/dL Final    Hematocrit 04/11/2023 44.2  37.5 - 51.0 % Final    MCV 04/11/2023 96.9  79.0 - 97.0 fL Final    MCH 04/11/2023 33.8 (H)  26.6 - 33.0 pg Final    MCHC 04/11/2023 34.8  31.5 - 35.7 g/dL Final    RDW 04/11/2023 12.9  12.3 - 15.4 % Final    RDW-SD 04/11/2023 45.9  37.0 - 54.0 fl Final    MPV 04/11/2023 10.1  6.0 - 12.0 fL Final    Platelets 04/11/2023 307  140 - 450 10*3/mm3 Final       ASSESSMENT/ PLAN:    Diagnoses and all orders for this visit:    1. Hypertension, essential (Primary)    2. Pulmonary embolism, unspecified chronicity, unspecified pulmonary embolism type, unspecified whether acute cor pulmonale present    3. Dizziness  Assessment & Plan:  His dizziness does not sound vertiginous.  He is more ataxic and lightheaded.  We will check labs and orthostatic vitals.    Orders:  -     CBC (No Diff)  -     Basic Metabolic Panel        Orders Placed Today:     No orders of the defined types were placed in this encounter.       Management Plan:     An After Visit Summary was printed and given to the patient at discharge.    Follow-up: Return in about 2 weeks (around 8/21/2023) for Recheck.    Tanvir Garcia,  8/7/2023 08:46 EDT  This note was electronically signed.

## 2023-08-21 ENCOUNTER — OFFICE VISIT (OUTPATIENT)
Dept: FAMILY MEDICINE CLINIC | Facility: CLINIC | Age: 78
End: 2023-08-21
Payer: MEDICARE

## 2023-08-21 VITALS
WEIGHT: 185 LBS | HEIGHT: 76 IN | OXYGEN SATURATION: 97 % | SYSTOLIC BLOOD PRESSURE: 146 MMHG | TEMPERATURE: 97.6 F | HEART RATE: 61 BPM | DIASTOLIC BLOOD PRESSURE: 68 MMHG | BODY MASS INDEX: 22.53 KG/M2

## 2023-08-21 DIAGNOSIS — R42 DIZZINESS: Primary | ICD-10-CM

## 2023-08-21 PROCEDURE — 3078F DIAST BP <80 MM HG: CPT | Performed by: FAMILY MEDICINE

## 2023-08-21 PROCEDURE — 3077F SYST BP >= 140 MM HG: CPT | Performed by: FAMILY MEDICINE

## 2023-08-21 PROCEDURE — 99213 OFFICE O/P EST LOW 20 MIN: CPT | Performed by: FAMILY MEDICINE

## 2023-08-21 NOTE — ASSESSMENT & PLAN NOTE
His symptoms of dizziness (lightheadedness) persists.  It is only when he is in the upright erect standing position.  His recent electrolytes thyroid functions and vital signs have all been normal.  Last visit his orthostatic vitals were also normal.  His dose of prednisone has remained unchanged for the last couple years.  Has a result of his persistent symptoms will check a carotid Doppler.

## 2023-08-21 NOTE — PROGRESS NOTES
Chief Complaint   Patient presents with    Follow-up     2 week / dizziness        Subjective     Walker Ordoñez  has a past medical history of Anxiety, Arthritis, Blood clotting disorder (04/27/2017), Broken bones, Coagulation disorder, Deep vein thrombosis, Depression, Hemorrhoid, Nephrolithiasis, PMR (polymyalgia rheumatica), and Pulmonary embolism.    Dizziness-he states since her last visit 2 weeks ago his symptoms are unchanged.  He states as long as he is laying or sitting he feels fine.  Once he stands up he feels lightheaded and then thoughts ataxic.  States as long as sees upright standing or walking it persist until he sits back down.      PHQ-2 Depression Screening  Little interest or pleasure in doing things?     Feeling down, depressed, or hopeless?     PHQ-2 Total Score     PHQ-9 Depression Screening  Little interest or pleasure in doing things?     Feeling down, depressed, or hopeless?     Trouble falling or staying asleep, or sleeping too much?     Feeling tired or having little energy?     Poor appetite or overeating?     Feeling bad about yourself - or that you are a failure or have let yourself or your family down?     Trouble concentrating on things, such as reading the newspaper or watching television?     Moving or speaking so slowly that other people could have noticed? Or the opposite - being so fidgety or restless that you have been moving around a lot more than usual?     Thoughts that you would be better off dead, or of hurting yourself in some way?     PHQ-9 Total Score     If you checked off any problems, how difficult have these problems made it for you to do your work, take care of things at home, or get along with other people?       Allergies   Allergen Reactions    Codeine GI Intolerance and Nausea Only     Sick at stomach       Prior to Admission medications    Medication Sig Start Date End Date Taking? Authorizing Provider   Eliquis 5 MG tablet tablet TAKE ONE TABLET BY MOUTH  TWICE A DAY 22  Yes Tanvir Garcia DO   lisinopril (PRINIVIL,ZESTRIL) 20 MG tablet TAKE ONE TABLET BY MOUTH DAILY 23  Yes Tanvir Garcia DO   meclizine (ANTIVERT) 25 MG tablet Take 1 tablet by mouth 3 (Three) Times a Day As Needed for Dizziness. 23  Yes Cheyenne Castillo APRN   metoprolol succinate XL (TOPROL-XL) 50 MG 24 hr tablet TAKE ONE TABLET BY MOUTH DAILY 23  Yes Tanvir Garcia DO   Multiple Vitamin (MULTI-VITAMIN DAILY PO) Take 1 tablet by mouth Daily.   Yes Provider, MD Eva   predniSONE (DELTASONE) 1 MG tablet TAKE ONE TABLET BY MOUTH FOUR TIMES A DAY 23  Yes Tanvir Garcia DO   sertraline (ZOLOFT) 25 MG tablet TAKE ONE TABLET BY MOUTH DAILY 23  Yes Tanvir Garcia DO        Patient Active Problem List   Diagnosis    Long term current use of systemic steroids    Hypertension, essential    PMR (polymyalgia rheumatica)    Depression    Pulmonary embolism    Pneumonia    Hospital discharge follow-up    Anxiety    Arthritis    Broken bones    Coagulation disorder    Hemorrhoid    Kidney stone    Nephrolithiasis    Cough    Primary osteoarthritis of left knee    Fall (on) (from) other stairs and steps, initial encounter    Sprain of tibiofibular ligament of left ankle    Skin tear of right elbow without complication    Dizziness        Past Surgical History:   Procedure Laterality Date    ARTHROPLASTY      Artificial Joints/Limbs    BACK SURGERY      Lumbar    COLONOSCOPY  2019    ESOPHAGUS SURGERY      stretching    FEMUR FRACTURE SURGERY Left 2019    HERNIA REPAIR      HIP FRACTURE SURGERY Left 2019    KNEE SURGERY Right 2005    NECK SURGERY      SHOULDER SURGERY Left 2000    Left Tear       Social History     Socioeconomic History    Marital status:    Tobacco Use    Smoking status: Former     Packs/day: 1.00     Types: Cigarettes     Start date: 1965     Quit date: 1972     Years since quittin.6     " Passive exposure: Never    Smokeless tobacco: Never    Tobacco comments:     Quit 46 years ago   Vaping Use    Vaping Use: Never used   Substance and Sexual Activity    Alcohol use: Not Currently    Drug use: Never    Sexual activity: Defer       Family History   Problem Relation Age of Onset    Arthritis Mother     Leukemia Mother     Heart disease Brother        Family history, surgical history, past medical history, Allergies and meds reviewed with patient today and updated in [facility] EMR.     ROS:  Review of Systems   Constitutional:  Negative for fatigue.   HENT:  Negative for congestion, postnasal drip, rhinorrhea and sinus pressure.    Eyes:  Negative for visual disturbance.   Respiratory:  Negative for cough, chest tightness and shortness of breath.    Cardiovascular:  Negative for chest pain and palpitations.   Neurological:  Positive for light-headedness. Negative for weakness, numbness and headache.     OBJECTIVE:  Vitals:    08/21/23 0915   BP: 146/68   BP Location: Left arm   Patient Position: Sitting   Pulse: 61   Temp: 97.6 øF (36.4 øC)   SpO2: 97%   Weight: 83.9 kg (185 lb)   Height: 193 cm (76\")     No results found.   Body mass index is 22.52 kg/mý.  No LMP for male patient.    Physical Exam  Vitals and nursing note reviewed.   Constitutional:       General: He is not in acute distress.     Appearance: Normal appearance. He is normal weight.   HENT:      Head: Normocephalic.      Right Ear: Tympanic membrane, ear canal and external ear normal.      Left Ear: Tympanic membrane, ear canal and external ear normal.      Nose: Nose normal.      Mouth/Throat:      Mouth: Mucous membranes are moist.      Pharynx: Oropharynx is clear.   Eyes:      General: No scleral icterus.     Conjunctiva/sclera: Conjunctivae normal.      Pupils: Pupils are equal, round, and reactive to light.   Neck:      Vascular: Normal carotid pulses. No carotid bruit.   Cardiovascular:      Rate and Rhythm: Normal rate and regular " rhythm.      Pulses: Normal pulses.      Heart sounds: Normal heart sounds. No murmur heard.   with a grade of 1/6.   Pulmonary:      Effort: Pulmonary effort is normal.      Breath sounds: Normal breath sounds. No wheezing, rhonchi or rales.   Musculoskeletal:      Cervical back: Neck supple. No rigidity or tenderness.   Lymphadenopathy:      Cervical: No cervical adenopathy.   Skin:     General: Skin is warm and dry.      Coloration: Skin is not jaundiced.      Findings: No rash.   Neurological:      General: No focal deficit present.      Mental Status: He is alert and oriented to person, place, and time.   Psychiatric:         Mood and Affect: Mood normal.         Thought Content: Thought content normal.         Judgment: Judgment normal.       Procedures    Office Visit on 08/07/2023   Component Date Value Ref Range Status    WBC 08/07/2023 7.86  3.40 - 10.80 10*3/mm3 Final    RBC 08/07/2023 4.61  4.14 - 5.80 10*6/mm3 Final    Hemoglobin 08/07/2023 15.4  13.0 - 17.7 g/dL Final    Hematocrit 08/07/2023 44.4  37.5 - 51.0 % Final    MCV 08/07/2023 96.3  79.0 - 97.0 fL Final    MCH 08/07/2023 33.4 (H)  26.6 - 33.0 pg Final    MCHC 08/07/2023 34.7  31.5 - 35.7 g/dL Final    RDW 08/07/2023 12.8  12.3 - 15.4 % Final    RDW-SD 08/07/2023 45.6  37.0 - 54.0 fl Final    MPV 08/07/2023 9.8  6.0 - 12.0 fL Final    Platelets 08/07/2023 291  140 - 450 10*3/mm3 Final    Glucose 08/07/2023 79  65 - 99 mg/dL Final    BUN 08/07/2023 16  8 - 23 mg/dL Final    Creatinine 08/07/2023 1.03  0.76 - 1.27 mg/dL Final    Sodium 08/07/2023 143  136 - 145 mmol/L Final    Potassium 08/07/2023 4.2  3.5 - 5.2 mmol/L Final    Chloride 08/07/2023 105  98 - 107 mmol/L Final    CO2 08/07/2023 26.3  22.0 - 29.0 mmol/L Final    Calcium 08/07/2023 9.9  8.6 - 10.5 mg/dL Final    BUN/Creatinine Ratio 08/07/2023 15.5  7.0 - 25.0 Final    Anion Gap 08/07/2023 11.7  5.0 - 15.0 mmol/L Final    eGFR 08/07/2023 73.9  >60.0 mL/min/1.73 Final       ASSESSMENT/  PLAN:    Diagnoses and all orders for this visit:    1. Dizziness (Primary)  Assessment & Plan:  His symptoms of dizziness (lightheadedness) persists.  It is only when he is in the upright erect standing position.  His recent electrolytes thyroid functions and vital signs have all been normal.  Last visit his orthostatic vitals were also normal.  His dose of prednisone has remained unchanged for the last couple years.  Has a result of his persistent symptoms will check a carotid Doppler.    Orders:  -     Duplex Carotid Ultrasound CAR; Future        Orders Placed Today:     No orders of the defined types were placed in this encounter.       Management Plan:     An After Visit Summary was printed and given to the patient at discharge.    Follow-up: Return for f/u after imaging.    Tanvir Garcia DO 8/21/2023 09:53 EDT  This note was electronically signed.

## 2023-09-01 ENCOUNTER — HOSPITAL ENCOUNTER (OUTPATIENT)
Dept: CARDIOLOGY | Facility: HOSPITAL | Age: 78
Discharge: HOME | End: 2023-09-01
Payer: MEDICARE

## 2023-09-01 DIAGNOSIS — R42 DIZZINESS: ICD-10-CM

## 2023-09-01 LAB
BH CV XLRA MEAS LEFT CAROTID BULB EDV: 9 CM/SEC
BH CV XLRA MEAS LEFT CAROTID BULB PSV: 34 CM/SEC
BH CV XLRA MEAS LEFT DIST CCA EDV: 14.7 CM/SEC
BH CV XLRA MEAS LEFT DIST CCA PSV: 65.9 CM/SEC
BH CV XLRA MEAS LEFT DIST ICA EDV: -29.4 CM/SEC
BH CV XLRA MEAS LEFT DIST ICA PSV: -98.8 CM/SEC
BH CV XLRA MEAS LEFT ICA/CCA RATIO: 0.91
BH CV XLRA MEAS LEFT MID ICA EDV: -19.6 CM/SEC
BH CV XLRA MEAS LEFT MID ICA PSV: -60.9 CM/SEC
BH CV XLRA MEAS LEFT PROX CCA EDV: 16.8 CM/SEC
BH CV XLRA MEAS LEFT PROX CCA PSV: 121.9 CM/SEC
BH CV XLRA MEAS LEFT PROX ECA EDV: -11.9 CM/SEC
BH CV XLRA MEAS LEFT PROX ECA PSV: -53.2 CM/SEC
BH CV XLRA MEAS LEFT PROX ICA EDV: -16.8 CM/SEC
BH CV XLRA MEAS LEFT PROX ICA PSV: -59.5 CM/SEC
BH CV XLRA MEAS LEFT VERTEBRAL A EDV: 11.9 CM/SEC
BH CV XLRA MEAS LEFT VERTEBRAL A PSV: 41.3 CM/SEC
BH CV XLRA MEAS RIGHT CAROTID BULB EDV: 10 CM/SEC
BH CV XLRA MEAS RIGHT CAROTID BULB PSV: 34 CM/SEC
BH CV XLRA MEAS RIGHT DIST CCA EDV: 10.6 CM/SEC
BH CV XLRA MEAS RIGHT DIST CCA PSV: 52.8 CM/SEC
BH CV XLRA MEAS RIGHT DIST ICA EDV: -30.1 CM/SEC
BH CV XLRA MEAS RIGHT DIST ICA PSV: -107.1 CM/SEC
BH CV XLRA MEAS RIGHT ICA/CCA RATIO: 0.75
BH CV XLRA MEAS RIGHT MID ICA EDV: -21.1 CM/SEC
BH CV XLRA MEAS RIGHT MID ICA PSV: -81.4 CM/SEC
BH CV XLRA MEAS RIGHT PROX CCA EDV: 10.6 CM/SEC
BH CV XLRA MEAS RIGHT PROX CCA PSV: 84.5 CM/SEC
BH CV XLRA MEAS RIGHT PROX ECA EDV: -13.7 CM/SEC
BH CV XLRA MEAS RIGHT PROX ECA PSV: -49.4 CM/SEC
BH CV XLRA MEAS RIGHT PROX ICA EDV: -14.3 CM/SEC
BH CV XLRA MEAS RIGHT PROX ICA PSV: -40.4 CM/SEC
BH CV XLRA MEAS RIGHT VERTEBRAL A EDV: -8.2 CM/SEC
BH CV XLRA MEAS RIGHT VERTEBRAL A PSV: -37.3 CM/SEC
LEFT ARM BP: NORMAL MMHG
RIGHT ARM BP: NORMAL MMHG

## 2023-09-01 PROCEDURE — 93880 EXTRACRANIAL BILAT STUDY: CPT

## 2023-10-03 ENCOUNTER — TELEPHONE (OUTPATIENT)
Dept: FAMILY MEDICINE CLINIC | Facility: CLINIC | Age: 78
End: 2023-10-03

## 2023-10-03 ENCOUNTER — CLINICAL SUPPORT (OUTPATIENT)
Dept: FAMILY MEDICINE CLINIC | Facility: CLINIC | Age: 78
End: 2023-10-03
Payer: MEDICARE

## 2023-10-03 DIAGNOSIS — M54.50 LOW BACK PAIN, UNSPECIFIED BACK PAIN LATERALITY, UNSPECIFIED CHRONICITY, UNSPECIFIED WHETHER SCIATICA PRESENT: Primary | ICD-10-CM

## 2023-10-03 DIAGNOSIS — R30.0 DYSURIA: Primary | ICD-10-CM

## 2023-10-03 LAB
BILIRUB BLD-MCNC: NEGATIVE MG/DL
CLARITY, POC: ABNORMAL
COLOR UR: YELLOW
EXPIRATION DATE: ABNORMAL
GLUCOSE UR STRIP-MCNC: NEGATIVE MG/DL
KETONES UR QL: NEGATIVE
LEUKOCYTE EST, POC: ABNORMAL
Lab: ABNORMAL
NITRITE UR-MCNC: NEGATIVE MG/ML
PH UR: 5.5 [PH] (ref 5–8)
PROT UR STRIP-MCNC: NEGATIVE MG/DL
RBC # UR STRIP: ABNORMAL /UL
SP GR UR: 1.02 (ref 1–1.03)
UROBILINOGEN UR QL: ABNORMAL

## 2023-10-03 PROCEDURE — 87086 URINE CULTURE/COLONY COUNT: CPT | Performed by: FAMILY MEDICINE

## 2023-10-03 PROCEDURE — 81003 URINALYSIS AUTO W/O SCOPE: CPT | Performed by: FAMILY MEDICINE

## 2023-10-03 RX ORDER — SULFAMETHOXAZOLE AND TRIMETHOPRIM 800; 160 MG/1; MG/1
1 TABLET ORAL 2 TIMES DAILY
Qty: 14 TABLET | Refills: 0 | Status: SHIPPED | OUTPATIENT
Start: 2023-10-03 | End: 2023-10-06

## 2023-10-03 NOTE — PROGRESS NOTES
[facility]  Cardiology progress Note    Patient Name: Walker Ordoñez  : 1945    CHIEF COMPLAINT  Hypertension        Subjective   Subjective     HISTORY OF PRESENT ILLNESS    Walker Ordoñez is a 77 y.o. male with history of hypertension and palpitations.  No palpitations.    REVIEW OF SYSTEMS    Constitutional:    No fever, no weight loss  Skin:     No rash  Otolaryngeal:    No difficulty swallowing  Cardiovascular: See HPI.  Pulmonary:    No cough, no sputum production    Personal History     Social History:    reports that he quit smoking about 51 years ago. His smoking use included cigarettes. He started smoking about 58 years ago. He smoked an average of 1 pack per day. He has never been exposed to tobacco smoke. He has never used smokeless tobacco. He reports that he does not currently use alcohol. He reports that he does not use drugs.    Home Medications:  Current Outpatient Medications on File Prior to Visit   Medication Sig    Eliquis 5 MG tablet tablet TAKE ONE TABLET BY MOUTH TWICE A DAY    lisinopril (PRINIVIL,ZESTRIL) 20 MG tablet TAKE ONE TABLET BY MOUTH DAILY    metoprolol succinate XL (TOPROL-XL) 50 MG 24 hr tablet TAKE ONE TABLET BY MOUTH DAILY    Multiple Vitamin (MULTI-VITAMIN DAILY PO) Take 1 tablet by mouth Daily.    predniSONE (DELTASONE) 1 MG tablet TAKE ONE TABLET BY MOUTH FOUR TIMES A DAY    sertraline (ZOLOFT) 25 MG tablet TAKE ONE TABLET BY MOUTH DAILY    [DISCONTINUED] meclizine (ANTIVERT) 25 MG tablet Take 1 tablet by mouth 3 (Three) Times a Day As Needed for Dizziness.    [DISCONTINUED] sulfamethoxazole-trimethoprim (Bactrim DS) 800-160 MG per tablet Take 1 tablet by mouth 2 (Two) Times a Day.     No current facility-administered medications on file prior to visit.       Past Medical History:   Diagnosis Date    Anxiety     Arthritis     Blood clotting disorder 2017    6 yrs ago pt had blood clot TJ    Broken bones     Coagulation disorder     Deep vein  thrombosis     Depression     Hemorrhoid     Nephrolithiasis     PMR (polymyalgia rheumatica)     Pulmonary embolism        Allergies:  Allergies   Allergen Reactions    Codeine GI Intolerance and Nausea Only     Sick at stomach       Objective    Objective       Vitals:   Heart Rate:  [61] 61  BP: (131)/(61) 131/61  Body mass index is 22.32 kg/m².     PHYSICAL EXAM:    General Appearance:   well developed  well nourished  HENT:   oropharynx moist  lips not cyanotic  Neck:  thyroid not enlarged  supple  Respiratory:  no respiratory distress  normal breath sounds  no rales  Cardiovascular:  no jugular venous distention  regular rhythm  apical impulse normal  S1 normal, S2 normal  no S3, no S4   SM GR 3/6 AA  no rub, no thrill  carotid pulses normal; no bruit  pedal pulses normal  lower extremity edema: none    Skin:   warm, dry  Psychiatric:  judgement and insight appropriate  normal mood and affect        Result Review:  I have personally reviewed the available results from  [x]  Laboratory  [x]  EKG  [x]  Cardiology  [x]  Medications  [x]  Old records  []  Other:     Procedures  Lab Results   Component Value Date    CHOL 154 10/07/2022    CHOL 159 11/04/2021     Lab Results   Component Value Date    TRIG 275 (H) 10/07/2022    TRIG 229 (H) 11/04/2021    TRIG 271 (H) 05/03/2021     Lab Results   Component Value Date    HDL 29 (L) 10/07/2022    HDL 33 (L) 11/04/2021    HDL 31 (L) 05/03/2021     Lab Results   Component Value Date    LDL 80 10/07/2022    LDL 87 11/04/2021    LDL 77 05/03/2021     Lab Results   Component Value Date    VLDL 45 (H) 10/07/2022    VLDL 39 11/04/2021    VLDL 54 (H) 05/03/2021        Impression/Plan:  1.  Stable palpitations: Continue Toprol-XL 50 mg once a day.  No further palpitations.  2.  Essential hypertension controlled: Continue lisinopril 20 mg once a day.  Continue Toprol-XL 50 mg once a day.  Blood pressure controlled at home.  3.  History of pulmonary embolism: Continue Eliquis 5 mg  twice a day.           Bimal Rivera MD   10/06/23   09:42 EDT

## 2023-10-03 NOTE — TELEPHONE ENCOUNTER
Patient called requesting appt for today for possible kidney infection. Stated he is having discomfort when urinating, low back pain, and blood in urine. Advised to patient dr garcia was full today and would return call when Dr. Garcia responds. Advised in the meantime if he does not want to wait for response, he can seek to UC.

## 2023-10-04 LAB — BACTERIA SPEC AEROBE CULT: NO GROWTH

## 2023-10-05 DIAGNOSIS — R30.0 DYSURIA: Primary | ICD-10-CM

## 2023-10-06 ENCOUNTER — OFFICE VISIT (OUTPATIENT)
Dept: CARDIOLOGY | Facility: CLINIC | Age: 78
End: 2023-10-06
Payer: MEDICARE

## 2023-10-06 VITALS
BODY MASS INDEX: 22.33 KG/M2 | HEIGHT: 76 IN | HEART RATE: 61 BPM | SYSTOLIC BLOOD PRESSURE: 131 MMHG | DIASTOLIC BLOOD PRESSURE: 61 MMHG | WEIGHT: 183.4 LBS

## 2023-10-06 DIAGNOSIS — R00.2 PALPITATIONS: Primary | ICD-10-CM

## 2023-10-06 DIAGNOSIS — I10 HYPERTENSION, ESSENTIAL: ICD-10-CM

## 2023-10-06 DIAGNOSIS — I35.0 NONRHEUMATIC AORTIC VALVE STENOSIS: ICD-10-CM

## 2023-10-06 PROCEDURE — 3075F SYST BP GE 130 - 139MM HG: CPT | Performed by: SPECIALIST

## 2023-10-06 PROCEDURE — 1159F MED LIST DOCD IN RCRD: CPT | Performed by: SPECIALIST

## 2023-10-06 PROCEDURE — 1160F RVW MEDS BY RX/DR IN RCRD: CPT | Performed by: SPECIALIST

## 2023-10-06 PROCEDURE — 99214 OFFICE O/P EST MOD 30 MIN: CPT | Performed by: SPECIALIST

## 2023-10-06 PROCEDURE — 3078F DIAST BP <80 MM HG: CPT | Performed by: SPECIALIST

## 2023-10-17 ENCOUNTER — OFFICE VISIT (OUTPATIENT)
Dept: FAMILY MEDICINE CLINIC | Facility: CLINIC | Age: 78
End: 2023-10-17
Payer: MEDICARE

## 2023-10-17 VITALS
HEART RATE: 72 BPM | SYSTOLIC BLOOD PRESSURE: 134 MMHG | TEMPERATURE: 97 F | DIASTOLIC BLOOD PRESSURE: 69 MMHG | BODY MASS INDEX: 22.65 KG/M2 | HEIGHT: 76 IN | OXYGEN SATURATION: 99 % | WEIGHT: 186 LBS

## 2023-10-17 DIAGNOSIS — I10 HYPERTENSION, ESSENTIAL: Primary | ICD-10-CM

## 2023-10-17 DIAGNOSIS — R31.21 ASYMPTOMATIC MICROSCOPIC HEMATURIA: ICD-10-CM

## 2023-10-17 DIAGNOSIS — R42 DIZZINESS: ICD-10-CM

## 2023-10-17 PROCEDURE — 3078F DIAST BP <80 MM HG: CPT | Performed by: FAMILY MEDICINE

## 2023-10-17 PROCEDURE — 3075F SYST BP GE 130 - 139MM HG: CPT | Performed by: FAMILY MEDICINE

## 2023-10-17 PROCEDURE — 99214 OFFICE O/P EST MOD 30 MIN: CPT | Performed by: FAMILY MEDICINE

## 2023-10-17 NOTE — ASSESSMENT & PLAN NOTE
His dizziness has remained unchanged.  He has seen cardiology and scheduled for an echocardiogram but that is not till after the first of the year.

## 2023-10-18 ENCOUNTER — CLINICAL SUPPORT (OUTPATIENT)
Dept: FAMILY MEDICINE CLINIC | Facility: CLINIC | Age: 78
End: 2023-10-18
Payer: MEDICARE

## 2023-10-18 DIAGNOSIS — I10 HYPERTENSION, ESSENTIAL: Primary | ICD-10-CM

## 2023-10-18 LAB
ALBUMIN SERPL-MCNC: 3.9 G/DL (ref 3.5–5.2)
ALBUMIN/GLOB SERPL: 1.6 G/DL
ALP SERPL-CCNC: 68 U/L (ref 39–117)
ALT SERPL W P-5'-P-CCNC: 13 U/L (ref 1–41)
ANION GAP SERPL CALCULATED.3IONS-SCNC: 13.6 MMOL/L (ref 5–15)
AST SERPL-CCNC: 19 U/L (ref 1–40)
BACTERIA UR QL AUTO: NORMAL /HPF
BILIRUB SERPL-MCNC: 0.5 MG/DL (ref 0–1.2)
BILIRUB UR QL STRIP: NEGATIVE
BUN SERPL-MCNC: 16 MG/DL (ref 8–23)
BUN/CREAT SERPL: 15.8 (ref 7–25)
CALCIUM SPEC-SCNC: 9.3 MG/DL (ref 8.6–10.5)
CHLORIDE SERPL-SCNC: 102 MMOL/L (ref 98–107)
CHOLEST SERPL-MCNC: 150 MG/DL (ref 0–200)
CLARITY UR: CLEAR
CO2 SERPL-SCNC: 26.4 MMOL/L (ref 22–29)
COLOR UR: ABNORMAL
CREAT SERPL-MCNC: 1.01 MG/DL (ref 0.76–1.27)
EGFRCR SERPLBLD CKD-EPI 2021: 75.7 ML/MIN/1.73
GLOBULIN UR ELPH-MCNC: 2.4 GM/DL
GLUCOSE SERPL-MCNC: 113 MG/DL (ref 65–99)
GLUCOSE UR STRIP-MCNC: NEGATIVE MG/DL
HDLC SERPL-MCNC: 30 MG/DL (ref 40–60)
HGB UR QL STRIP.AUTO: ABNORMAL
HYALINE CASTS UR QL AUTO: NORMAL /LPF
KETONES UR QL STRIP: ABNORMAL
LDLC SERPL CALC-MCNC: 86 MG/DL (ref 0–100)
LDLC/HDLC SERPL: 2.69 {RATIO}
LEUKOCYTE ESTERASE UR QL STRIP.AUTO: ABNORMAL
NITRITE UR QL STRIP: NEGATIVE
PH UR STRIP.AUTO: 7 [PH] (ref 5–8)
POTASSIUM SERPL-SCNC: 4.5 MMOL/L (ref 3.5–5.2)
PROT SERPL-MCNC: 6.3 G/DL (ref 6–8.5)
PROT UR QL STRIP: ABNORMAL
RBC # UR STRIP: NORMAL /HPF
REF LAB TEST METHOD: NORMAL
SODIUM SERPL-SCNC: 142 MMOL/L (ref 136–145)
SP GR UR STRIP: 1.02 (ref 1–1.03)
SQUAMOUS #/AREA URNS HPF: NORMAL /HPF
TRIGL SERPL-MCNC: 196 MG/DL (ref 0–150)
UROBILINOGEN UR QL STRIP: ABNORMAL
VLDLC SERPL-MCNC: 34 MG/DL (ref 5–40)
WBC # UR STRIP: NORMAL /HPF

## 2023-10-18 PROCEDURE — 81001 URINALYSIS AUTO W/SCOPE: CPT | Performed by: FAMILY MEDICINE

## 2023-10-18 PROCEDURE — 36415 COLL VENOUS BLD VENIPUNCTURE: CPT | Performed by: FAMILY MEDICINE

## 2023-10-18 PROCEDURE — 80061 LIPID PANEL: CPT | Performed by: FAMILY MEDICINE

## 2023-10-18 PROCEDURE — 80053 COMPREHEN METABOLIC PANEL: CPT | Performed by: FAMILY MEDICINE

## 2023-10-19 DIAGNOSIS — R31.0 GROSS HEMATURIA: Primary | ICD-10-CM

## 2023-10-30 ENCOUNTER — HOSPITAL ENCOUNTER (OUTPATIENT)
Dept: CT IMAGING | Facility: HOSPITAL | Age: 78
Discharge: HOME | End: 2023-10-30
Admitting: FAMILY MEDICINE
Payer: MEDICARE

## 2023-10-30 DIAGNOSIS — R30.0 DYSURIA: ICD-10-CM

## 2023-10-30 PROCEDURE — 74176 CT ABD & PELVIS W/O CONTRAST: CPT

## 2023-11-14 RX ORDER — METOPROLOL SUCCINATE 50 MG/1
50 TABLET, EXTENDED RELEASE ORAL DAILY
Qty: 90 TABLET | Refills: 1 | Status: SHIPPED | OUTPATIENT
Start: 2023-11-14

## 2023-11-14 RX ORDER — SERTRALINE HYDROCHLORIDE 25 MG/1
25 TABLET, FILM COATED ORAL DAILY
Qty: 90 TABLET | Refills: 1 | Status: SHIPPED | OUTPATIENT
Start: 2023-11-14

## 2023-12-29 RX ORDER — APIXABAN 5 MG/1
TABLET, FILM COATED ORAL
Qty: 60 TABLET | Refills: 12 | Status: SHIPPED | OUTPATIENT
Start: 2023-12-29

## 2024-01-11 RX ORDER — PREDNISONE 1 MG/1
1 TABLET ORAL 4 TIMES DAILY
Qty: 360 TABLET | Refills: 1 | Status: SHIPPED | OUTPATIENT
Start: 2024-01-11

## 2024-01-15 RX ORDER — LISINOPRIL 20 MG/1
20 TABLET ORAL DAILY
Qty: 90 TABLET | Refills: 1 | Status: SHIPPED | OUTPATIENT
Start: 2024-01-15

## 2024-01-20 PROBLEM — R31.0 GROSS HEMATURIA: Status: ACTIVE | Noted: 2024-01-20

## 2024-01-21 NOTE — PROGRESS NOTES
Chief Complaint: Urologic complaint    Subjective         History of Present Illness  Walker Ordoñez is a 78 y.o. male           78-year-old  gentleman       Gross hematuria  Nephrolithiasis        1 episode of blood in 10/23.  No blood since, asymptomatic      10/30/2023 CT stone protocol - no change low-density mass uncinate process pancreas favored to be benign no change since 2009.  Right side has 2 to 3 mm nonobstructing stones.  Left side has 4 stones and 8 mm, three 5 mm all nonobstructing.  Images reviewed.  10/18/2023 UA - 0-2 RBCs, no bacteria, 1.0, GFR 75      No acute stone episodes in the last few years.    Voiding ok.  No prostate medication    PVR    1/24 000      No CAD patient does not smoke.  On Eliquis for history of DVT/PE        s/ post 3 lithotripsies, has had multiple kidney stones.        Previous    510/18 b/l ureteroscopy - normal bladder with some minor trabeculations.  Both sides were free of stone at the end of the procedure.    5/18 calcium oxalate dihydrate 30%, calcium oxalate monohydrate 60%, calcium phosphate 10%    5/4/18 CT abdomen/pelvis without  8.5 mm proximal right ureteral calculus.  3 other stones in the right kidney.  A 9 mm, 3 mm and 4 mm.  Left side has a couple of stones that amount to about a 1.5 x 2 cm stone.  All nonobstructing                Objective     Past Medical History:   Diagnosis Date    Anxiety     Arthritis     Blood clotting disorder 04/27/2017    6 yrs ago pt had blood clot TJ    Broken bones     Coagulation disorder     Deep vein thrombosis     Depression     Hemorrhoid     Nephrolithiasis     PMR (polymyalgia rheumatica)     Pulmonary embolism        Past Surgical History:   Procedure Laterality Date    ARTHROPLASTY      Artificial Joints/Limbs    BACK SURGERY      Lumbar    COLONOSCOPY  2019    ESOPHAGUS SURGERY      stretching    FEMUR FRACTURE SURGERY Left 2019    HERNIA REPAIR      HIP FRACTURE SURGERY Left 2019    KNEE SURGERY Right  2005    NECK SURGERY      SHOULDER SURGERY Left 2000    Left Tear         Current Outpatient Medications:     Eliquis 5 MG tablet tablet, TAKE ONE TABLET BY MOUTH TWICE A DAY, Disp: 60 tablet, Rfl: 12    lisinopril (PRINIVIL,ZESTRIL) 20 MG tablet, Take 1 tablet by mouth Daily., Disp: 90 tablet, Rfl: 1    metoprolol succinate XL (TOPROL-XL) 50 MG 24 hr tablet, TAKE 1 TABLET BY MOUTH DAILY, Disp: 90 tablet, Rfl: 1    Multiple Vitamin (MULTI-VITAMIN DAILY PO), Take 1 tablet by mouth Daily., Disp: , Rfl:     predniSONE (DELTASONE) 1 MG tablet, TAKE 1 TABLET BY MOUTH 4 TIMES A DAY, Disp: 360 tablet, Rfl: 1    sertraline (ZOLOFT) 25 MG tablet, TAKE 1 TABLET BY MOUTH DAILY, Disp: 90 tablet, Rfl: 1    Allergies   Allergen Reactions    Codeine GI Intolerance and Nausea Only     Sick at stomach        Family History   Problem Relation Age of Onset    Arthritis Mother     Leukemia Mother     Heart disease Brother        Social History     Socioeconomic History    Marital status:    Tobacco Use    Smoking status: Former     Packs/day: 1     Types: Cigarettes     Start date: 1965     Quit date: 1972     Years since quittin.0     Passive exposure: Never    Smokeless tobacco: Never    Tobacco comments:     Quit 46 years ago   Vaping Use    Vaping Use: Never used   Substance and Sexual Activity    Alcohol use: Not Currently    Drug use: Never    Sexual activity: Defer       Vital Signs:   There were no vitals taken for this visit.     Physical exam    Alert and orient x3  Well appearing, well developed, in no acute distress   Unlabored respirations  Nontender/nondistended      Grossly oriented to person, place and time, judgment is intact, normal mood and affect              Assessment and Plan    Diagnoses and all orders for this visit:    1. Gross hematuria (Primary)    2. Nephrolithiasis      Nephrolithiasis/gross hematuria      After discussion we will get patient set up for cystoscopy with left ureteroscopy  with laser and left ureteral stent placement.  Right retrograde pyelogram.  Risks and benefits were discussed including bleeding, infection and damage to the urinary system.  We also discussed the risk of anesthesia up to and including death.  Patient voiced understanding and would like to proceed.    Redoing this to rule out underlying pathology but also remove the stones from the left side.  Patient voiced understanding       Hold Eliquis x 3 days

## 2024-01-23 ENCOUNTER — OFFICE VISIT (OUTPATIENT)
Dept: UROLOGY | Facility: CLINIC | Age: 79
End: 2024-01-23
Payer: MEDICARE

## 2024-01-23 ENCOUNTER — PREP FOR SURGERY (OUTPATIENT)
Dept: OTHER | Facility: HOSPITAL | Age: 79
End: 2024-01-23
Payer: MEDICARE

## 2024-01-23 VITALS — WEIGHT: 188 LBS | BODY MASS INDEX: 22.89 KG/M2 | HEIGHT: 76 IN

## 2024-01-23 DIAGNOSIS — N20.0 NEPHROLITHIASIS: ICD-10-CM

## 2024-01-23 DIAGNOSIS — N20.0 NEPHROLITHIASIS: Primary | ICD-10-CM

## 2024-01-23 DIAGNOSIS — R31.0 GROSS HEMATURIA: Primary | ICD-10-CM

## 2024-01-23 PROCEDURE — 99214 OFFICE O/P EST MOD 30 MIN: CPT | Performed by: UROLOGY

## 2024-01-23 PROCEDURE — 1160F RVW MEDS BY RX/DR IN RCRD: CPT | Performed by: UROLOGY

## 2024-01-23 PROCEDURE — 1159F MED LIST DOCD IN RCRD: CPT | Performed by: UROLOGY

## 2024-01-23 RX ORDER — PHENOL 1.4 %
600 AEROSOL, SPRAY (ML) MUCOUS MEMBRANE DAILY
COMMUNITY

## 2024-01-23 RX ORDER — SODIUM CHLORIDE 0.9 % (FLUSH) 0.9 %
10 SYRINGE (ML) INJECTION AS NEEDED
OUTPATIENT
Start: 2024-01-23

## 2024-01-23 RX ORDER — SODIUM CHLORIDE 9 MG/ML
40 INJECTION, SOLUTION INTRAVENOUS AS NEEDED
OUTPATIENT
Start: 2024-01-23

## 2024-01-23 RX ORDER — SODIUM CHLORIDE 0.9 % (FLUSH) 0.9 %
3 SYRINGE (ML) INJECTION EVERY 12 HOURS SCHEDULED
OUTPATIENT
Start: 2024-01-23

## 2024-01-23 RX ORDER — LEVOFLOXACIN 5 MG/ML
500 INJECTION, SOLUTION INTRAVENOUS ONCE
OUTPATIENT
Start: 2024-01-23 | End: 2024-01-23

## 2024-01-23 RX ORDER — SODIUM CHLORIDE 9 MG/ML
100 INJECTION, SOLUTION INTRAVENOUS CONTINUOUS
OUTPATIENT
Start: 2024-01-23

## 2024-01-29 ENCOUNTER — LAB (OUTPATIENT)
Dept: LAB | Facility: HOSPITAL | Age: 79
End: 2024-01-29
Payer: MEDICARE

## 2024-01-29 DIAGNOSIS — R31.0 GROSS HEMATURIA: ICD-10-CM

## 2024-01-29 DIAGNOSIS — N20.0 NEPHROLITHIASIS: ICD-10-CM

## 2024-01-29 PROCEDURE — 87086 URINE CULTURE/COLONY COUNT: CPT

## 2024-01-30 LAB — BACTERIA SPEC AEROBE CULT: NO GROWTH

## 2024-02-12 NOTE — PRE-PROCEDURE INSTRUCTIONS
PRE-OP INSTRUCTIONS REVIEWED WITH PATIENT: FASTING/BATHING/ARRIVAL PROCEDURES.  INSTRUCTED TO TAKE A.M. DAY OF SURGERY: ZOLOFT, PREDNISONE  UNDERSTANDING VERBALIZED.   Sal Rivera  [facility] [address] )         

## 2024-02-13 ENCOUNTER — ANESTHESIA EVENT (OUTPATIENT)
Dept: PERIOP | Facility: HOSPITAL | Age: 79
End: 2024-02-13
Payer: MEDICARE

## 2024-02-14 ENCOUNTER — ANESTHESIA (OUTPATIENT)
Dept: PERIOP | Facility: HOSPITAL | Age: 79
End: 2024-02-14
Payer: MEDICARE

## 2024-02-14 ENCOUNTER — HOSPITAL ENCOUNTER (OUTPATIENT)
Facility: HOSPITAL | Age: 79
Discharge: HOME | End: 2024-02-14
Attending: UROLOGY | Admitting: UROLOGY
Payer: MEDICARE

## 2024-02-14 ENCOUNTER — APPOINTMENT (OUTPATIENT)
Dept: GENERAL RADIOLOGY | Facility: HOSPITAL | Age: 79
End: 2024-02-14
Payer: MEDICARE

## 2024-02-14 VITALS
HEART RATE: 57 BPM | OXYGEN SATURATION: 98 % | TEMPERATURE: 98.5 F | BODY MASS INDEX: 22.98 KG/M2 | DIASTOLIC BLOOD PRESSURE: 61 MMHG | HEIGHT: 76 IN | SYSTOLIC BLOOD PRESSURE: 140 MMHG | RESPIRATION RATE: 18 BRPM | WEIGHT: 188.71 LBS

## 2024-02-14 DIAGNOSIS — N20.0 NEPHROLITHIASIS: ICD-10-CM

## 2024-02-14 LAB
LAB AP CASE REPORT: NORMAL
LAB AP CLINICAL INFORMATION: NORMAL
PATH REPORT.FINAL DX SPEC: NORMAL
PATH REPORT.GROSS SPEC: NORMAL
QT INTERVAL: 472 MS
QTC INTERVAL: 440 MS

## 2024-02-14 PROCEDURE — 25510000001 IOPAMIDOL PER 1 ML: Performed by: UROLOGY

## 2024-02-14 PROCEDURE — C1769 GUIDE WIRE: HCPCS | Performed by: UROLOGY

## 2024-02-14 PROCEDURE — 76000 FLUOROSCOPY <1 HR PHYS/QHP: CPT

## 2024-02-14 PROCEDURE — C1758 CATHETER, URETERAL: HCPCS | Performed by: UROLOGY

## 2024-02-14 PROCEDURE — 25810000003 LACTATED RINGERS PER 1000 ML: Performed by: ANESTHESIOLOGY

## 2024-02-14 PROCEDURE — 74420 UROGRAPHY RTRGR +-KUB: CPT | Performed by: UROLOGY

## 2024-02-14 PROCEDURE — 25010000002 FENTANYL CITRATE (PF) 50 MCG/ML SOLUTION: Performed by: NURSE ANESTHETIST, CERTIFIED REGISTERED

## 2024-02-14 PROCEDURE — 25010000002 ONDANSETRON PER 1 MG: Performed by: NURSE ANESTHETIST, CERTIFIED REGISTERED

## 2024-02-14 PROCEDURE — C1894 INTRO/SHEATH, NON-LASER: HCPCS | Performed by: UROLOGY

## 2024-02-14 PROCEDURE — 25010000002 DEXAMETHASONE PER 1 MG: Performed by: NURSE ANESTHETIST, CERTIFIED REGISTERED

## 2024-02-14 PROCEDURE — 88300 SURGICAL PATH GROSS: CPT | Performed by: UROLOGY

## 2024-02-14 PROCEDURE — 25010000002 LEVOFLOXACIN PER 250 MG: Performed by: UROLOGY

## 2024-02-14 PROCEDURE — 25010000002 SUGAMMADEX 200 MG/2ML SOLUTION: Performed by: NURSE ANESTHETIST, CERTIFIED REGISTERED

## 2024-02-14 PROCEDURE — A9270 NON-COVERED ITEM OR SERVICE: HCPCS | Performed by: UROLOGY

## 2024-02-14 PROCEDURE — 63710000001 ACETAMINOPHEN EXTRA STRENGTH 500 MG TABLET: Performed by: ANESTHESIOLOGY

## 2024-02-14 PROCEDURE — 63710000001 HYDROCODONE-ACETAMINOPHEN 5-325 MG TABLET: Performed by: UROLOGY

## 2024-02-14 PROCEDURE — A9270 NON-COVERED ITEM OR SERVICE: HCPCS | Performed by: ANESTHESIOLOGY

## 2024-02-14 PROCEDURE — 93005 ELECTROCARDIOGRAM TRACING: CPT | Performed by: UROLOGY

## 2024-02-14 PROCEDURE — 25010000002 HYDROMORPHONE 1 MG/ML SOLUTION: Performed by: NURSE ANESTHETIST, CERTIFIED REGISTERED

## 2024-02-14 PROCEDURE — 25010000002 PROPOFOL 10 MG/ML EMULSION: Performed by: NURSE ANESTHETIST, CERTIFIED REGISTERED

## 2024-02-14 PROCEDURE — 52356 CYSTO/URETERO W/LITHOTRIPSY: CPT | Performed by: UROLOGY

## 2024-02-14 PROCEDURE — C2617 STENT, NON-COR, TEM W/O DEL: HCPCS | Performed by: UROLOGY

## 2024-02-14 PROCEDURE — 63710000001 PHENAZOPYRIDINE 100 MG TABLET: Performed by: UROLOGY

## 2024-02-14 PROCEDURE — 82365 CALCULUS SPECTROSCOPY: CPT | Performed by: UROLOGY

## 2024-02-14 DEVICE — IMPLANTABLE DEVICE: Type: IMPLANTABLE DEVICE | Site: URETER | Status: FUNCTIONAL

## 2024-02-14 RX ORDER — ROCURONIUM BROMIDE 10 MG/ML
INJECTION, SOLUTION INTRAVENOUS AS NEEDED
Status: DISCONTINUED | OUTPATIENT
Start: 2024-02-14 | End: 2024-02-14 | Stop reason: SURG

## 2024-02-14 RX ORDER — MEPERIDINE HYDROCHLORIDE 25 MG/ML
12.5 INJECTION INTRAMUSCULAR; INTRAVENOUS; SUBCUTANEOUS
Status: DISCONTINUED | OUTPATIENT
Start: 2024-02-14 | End: 2024-02-14 | Stop reason: HOSPADM

## 2024-02-14 RX ORDER — DEXAMETHASONE SODIUM PHOSPHATE 4 MG/ML
INJECTION, SOLUTION INTRA-ARTICULAR; INTRALESIONAL; INTRAMUSCULAR; INTRAVENOUS; SOFT TISSUE AS NEEDED
Status: DISCONTINUED | OUTPATIENT
Start: 2024-02-14 | End: 2024-02-14 | Stop reason: SURG

## 2024-02-14 RX ORDER — ONDANSETRON 2 MG/ML
4 INJECTION INTRAMUSCULAR; INTRAVENOUS ONCE AS NEEDED
Status: DISCONTINUED | OUTPATIENT
Start: 2024-02-14 | End: 2024-02-14 | Stop reason: HOSPADM

## 2024-02-14 RX ORDER — PROMETHAZINE HYDROCHLORIDE 25 MG/1
25 SUPPOSITORY RECTAL ONCE AS NEEDED
Status: DISCONTINUED | OUTPATIENT
Start: 2024-02-14 | End: 2024-02-14 | Stop reason: HOSPADM

## 2024-02-14 RX ORDER — SODIUM CHLORIDE 0.9 % (FLUSH) 0.9 %
3 SYRINGE (ML) INJECTION EVERY 12 HOURS SCHEDULED
Status: DISCONTINUED | OUTPATIENT
Start: 2024-02-14 | End: 2024-02-14 | Stop reason: HOSPADM

## 2024-02-14 RX ORDER — PROMETHAZINE HYDROCHLORIDE 12.5 MG/1
12.5 TABLET ORAL ONCE AS NEEDED
Status: DISCONTINUED | OUTPATIENT
Start: 2024-02-14 | End: 2024-02-14 | Stop reason: HOSPADM

## 2024-02-14 RX ORDER — PHENAZOPYRIDINE HYDROCHLORIDE 100 MG/1
100 TABLET, FILM COATED ORAL
Status: DISCONTINUED | OUTPATIENT
Start: 2024-02-14 | End: 2024-02-14 | Stop reason: HOSPADM

## 2024-02-14 RX ORDER — ACETAMINOPHEN 500 MG
1000 TABLET ORAL ONCE
Status: COMPLETED | OUTPATIENT
Start: 2024-02-14 | End: 2024-02-14

## 2024-02-14 RX ORDER — ONDANSETRON 4 MG/1
4 TABLET, ORALLY DISINTEGRATING ORAL ONCE AS NEEDED
Status: DISCONTINUED | OUTPATIENT
Start: 2024-02-14 | End: 2024-02-14 | Stop reason: HOSPADM

## 2024-02-14 RX ORDER — FENTANYL CITRATE 50 UG/ML
INJECTION, SOLUTION INTRAMUSCULAR; INTRAVENOUS AS NEEDED
Status: DISCONTINUED | OUTPATIENT
Start: 2024-02-14 | End: 2024-02-14 | Stop reason: SURG

## 2024-02-14 RX ORDER — ACETAMINOPHEN 325 MG/1
650 TABLET ORAL ONCE
Status: DISCONTINUED | OUTPATIENT
Start: 2024-02-14 | End: 2024-02-14

## 2024-02-14 RX ORDER — SODIUM CHLORIDE 9 MG/ML
100 INJECTION, SOLUTION INTRAVENOUS CONTINUOUS
Status: DISCONTINUED | OUTPATIENT
Start: 2024-02-14 | End: 2024-02-14 | Stop reason: HOSPADM

## 2024-02-14 RX ORDER — MAGNESIUM HYDROXIDE 1200 MG/15ML
LIQUID ORAL AS NEEDED
Status: DISCONTINUED | OUTPATIENT
Start: 2024-02-14 | End: 2024-02-14 | Stop reason: HOSPADM

## 2024-02-14 RX ORDER — PROPOFOL 10 MG/ML
VIAL (ML) INTRAVENOUS AS NEEDED
Status: DISCONTINUED | OUTPATIENT
Start: 2024-02-14 | End: 2024-02-14 | Stop reason: SURG

## 2024-02-14 RX ORDER — ONDANSETRON 2 MG/ML
INJECTION INTRAMUSCULAR; INTRAVENOUS AS NEEDED
Status: DISCONTINUED | OUTPATIENT
Start: 2024-02-14 | End: 2024-02-14 | Stop reason: SURG

## 2024-02-14 RX ORDER — HYDROCODONE BITARTRATE AND ACETAMINOPHEN 5; 325 MG/1; MG/1
1 TABLET ORAL ONCE AS NEEDED
Status: DISCONTINUED | OUTPATIENT
Start: 2024-02-14 | End: 2024-02-14 | Stop reason: HOSPADM

## 2024-02-14 RX ORDER — OXYCODONE HYDROCHLORIDE 5 MG/1
5 TABLET ORAL
Status: DISCONTINUED | OUTPATIENT
Start: 2024-02-14 | End: 2024-02-14 | Stop reason: HOSPADM

## 2024-02-14 RX ORDER — HYDROCODONE BITARTRATE AND ACETAMINOPHEN 5; 325 MG/1; MG/1
1 TABLET ORAL EVERY 6 HOURS PRN
Qty: 12 TABLET | Refills: 0 | Status: SHIPPED | OUTPATIENT
Start: 2024-02-14

## 2024-02-14 RX ORDER — LIDOCAINE HYDROCHLORIDE 20 MG/ML
INJECTION, SOLUTION EPIDURAL; INFILTRATION; INTRACAUDAL; PERINEURAL AS NEEDED
Status: DISCONTINUED | OUTPATIENT
Start: 2024-02-14 | End: 2024-02-14 | Stop reason: SURG

## 2024-02-14 RX ORDER — SODIUM CHLORIDE 9 MG/ML
40 INJECTION, SOLUTION INTRAVENOUS AS NEEDED
Status: DISCONTINUED | OUTPATIENT
Start: 2024-02-14 | End: 2024-02-14 | Stop reason: HOSPADM

## 2024-02-14 RX ORDER — PROMETHAZINE HYDROCHLORIDE 12.5 MG/1
25 TABLET ORAL ONCE AS NEEDED
Status: DISCONTINUED | OUTPATIENT
Start: 2024-02-14 | End: 2024-02-14 | Stop reason: HOSPADM

## 2024-02-14 RX ORDER — SODIUM CHLORIDE 0.9 % (FLUSH) 0.9 %
10 SYRINGE (ML) INJECTION AS NEEDED
Status: DISCONTINUED | OUTPATIENT
Start: 2024-02-14 | End: 2024-02-14 | Stop reason: HOSPADM

## 2024-02-14 RX ORDER — LEVOFLOXACIN 5 MG/ML
500 INJECTION, SOLUTION INTRAVENOUS ONCE
Status: COMPLETED | OUTPATIENT
Start: 2024-02-14 | End: 2024-02-14

## 2024-02-14 RX ORDER — SODIUM CHLORIDE, SODIUM LACTATE, POTASSIUM CHLORIDE, CALCIUM CHLORIDE 600; 310; 30; 20 MG/100ML; MG/100ML; MG/100ML; MG/100ML
9 INJECTION, SOLUTION INTRAVENOUS CONTINUOUS PRN
Status: DISCONTINUED | OUTPATIENT
Start: 2024-02-14 | End: 2024-02-14 | Stop reason: HOSPADM

## 2024-02-14 RX ADMIN — ONDANSETRON HYDROCHLORIDE 4 MG: 2 SOLUTION INTRAMUSCULAR; INTRAVENOUS at 09:46

## 2024-02-14 RX ADMIN — LEVOFLOXACIN 500 MG: 5 INJECTION, SOLUTION INTRAVENOUS at 09:39

## 2024-02-14 RX ADMIN — ROCURONIUM BROMIDE 40 MG: 10 INJECTION, SOLUTION INTRAVENOUS at 09:46

## 2024-02-14 RX ADMIN — ACETAMINOPHEN 1000 MG: 500 TABLET ORAL at 07:27

## 2024-02-14 RX ADMIN — PROPOFOL 150 MG: 10 INJECTION, EMULSION INTRAVENOUS at 09:46

## 2024-02-14 RX ADMIN — SUGAMMADEX 200 MG: 100 INJECTION, SOLUTION INTRAVENOUS at 10:36

## 2024-02-14 RX ADMIN — FENTANYL CITRATE 25 MCG: 50 INJECTION, SOLUTION INTRAMUSCULAR; INTRAVENOUS at 09:58

## 2024-02-14 RX ADMIN — DEXAMETHASONE SODIUM PHOSPHATE 4 MG: 4 INJECTION, SOLUTION INTRAMUSCULAR; INTRAVENOUS at 09:46

## 2024-02-14 RX ADMIN — PHENAZOPYRIDINE 100 MG: 100 TABLET ORAL at 12:44

## 2024-02-14 RX ADMIN — HYDROMORPHONE HYDROCHLORIDE 0.25 MG: 1 INJECTION, SOLUTION INTRAMUSCULAR; INTRAVENOUS; SUBCUTANEOUS at 12:25

## 2024-02-14 RX ADMIN — SODIUM CHLORIDE, POTASSIUM CHLORIDE, SODIUM LACTATE AND CALCIUM CHLORIDE: 600; 310; 30; 20 INJECTION, SOLUTION INTRAVENOUS at 10:02

## 2024-02-14 RX ADMIN — HYDROCODONE BITARTRATE AND ACETAMINOPHEN 1 TABLET: 5; 325 TABLET ORAL at 12:00

## 2024-02-14 RX ADMIN — LIDOCAINE HYDROCHLORIDE 80 MG: 20 INJECTION, SOLUTION EPIDURAL; INFILTRATION; INTRACAUDAL; PERINEURAL at 09:46

## 2024-02-14 RX ADMIN — SODIUM CHLORIDE, POTASSIUM CHLORIDE, SODIUM LACTATE AND CALCIUM CHLORIDE 9 ML/HR: 600; 310; 30; 20 INJECTION, SOLUTION INTRAVENOUS at 07:27

## 2024-02-14 RX ADMIN — FENTANYL CITRATE 25 MCG: 50 INJECTION, SOLUTION INTRAMUSCULAR; INTRAVENOUS at 09:46

## 2024-02-14 NOTE — OP NOTE
URETEROSCOPY LASER LITHOTRIPSY WITH STENT INSERTION  Procedure Report    Patient Name:  Walker Ordoñez  YOB: 1945    Date of Surgery:  2/14/2024      Pre-op Diagnosis:   Nephrolithiasis [N20.0]       Postop diagnosis:    Same    Procedure/CPT® Codes:      Procedure(s):      cystoscopy    left ureteroscopy with laser and basket extraction of stone   left ureteral stent placement   Right retrograde pyelogram    Staff:  Surgeon(s):  Marcus Coello MD         Anesthesia: General    Estimated Blood Loss: 2 mL    Implants:    Implant Name Type Inv. Item Serial No.  Lot No. LRB No. Used Action   STNT URETRL ASCERTA 6F 30CM - [id number] Stent STNT URETRL ASCERTA 6F 30CM  [employer] [id number] Left 1 Implanted       Specimen:          Specimens       ID Source Type Tests Collected By Collected At Frozen?    A Kidney, Left Calculus TISSUE PATHOLOGY EXAM  STONE ANALYSIS   Marcus Coello MD 2/14/24 0959 No    Description: LEFT URETERAL STONE                Findings:     5 cm prostate  Fluid easily - did have to leave the catheter in the procedure secondary to prostatic bleeding, required a 20 Kinyarwanda coudé to get the catheter to go when    Normal bladder with some minor trabeculation    Normal right retrograde    All stones removed from the left side    6 x 30 stent placed with no string            Complications: none    Description of Procedure:     After informed consent patient taken to the operating room.  Patient was laid supine and placed under general anesthesia by the anesthesia team.  At this point patient was placed in dorsal lithotomy position and prepped and draped in normal sterile fashion.  A multidisciplinary timeout was undertaken documenting the correct patient site and procedure.  At this point a 22 rigid cystoscope was placed into the urethra . Bladder was normal.    At this point I used a cone-tip catheter to treat her right retrograde pyelogram.  No filling  defects, no hydronephrosis and this drained well.      At this point a Glidewire was placed up the left   ureter without any issue under fluoroscopic guidance.  I then placed a dual-lumen catheter and a stiff wire alongside the Glidewire under fluoroscopic guidance.  The dual-lumen was removed and a ureteral access sheath was placed into the distal ureter without any problem.  I removed the obturator and wire and placed a flexible ureteroscope up the ueter.  The stone was identified.  Stone was lasered into multiple small fragments with a 272 µm laser fiber and then these pieces were basketed out with a no tip nitinol basket.  I then took the flexible ureteroscope up and check the rest of the ureter and the upper collecting system.  There were no further stones.  Brought the actual sheath out under direct vision and there was no further stones.    Left side was free of stones.  A 6 x 30 ureteral stent was then placed over the Glidewire through a rigid cystoscope without issue and had a good curl in the bladder under direct vision and a good curl in the left renal pelvis under fluoroscopy.     There was quite a bit of prostatic bleeding so a 20 Anguillan catheter was placed could not get a straight catheter once I placed a 20 Anguillan coudé.  20 mL of fluid placed in the balloon without issue.  Irrigated to blood-tinged.  No clots       Bladder was drained.  Patient tolerated the procedure well, he was taken to the postanesthesia care unit without issue.          Marcus Coello MD     Date: 2/14/2024  Time: 10:39 EST

## 2024-02-14 NOTE — H&P
[facility]   UROLOGY HISTORY AND PHYSICAL    Patient Name: Walker Ordoñez  : 1945  MRN: [id number]  Primary Care Physician:  Tanvir Garcia DO  Date of admission: 2024    Subjective   Subjective     Chief Complaint:   Gross hematuria  Nephrolithiasis    HPI:    Walker Ordoñez is a 78 y.o. male     Gross hematuria  Nephrolithiasis    No change in H&P    Review of Systems     10 systems reviewed and are negative other than what is listed in HPI    Personal History     Past Medical History:   Diagnosis Date    Anxiety     Arthritis     Blood clotting disorder 2017    6 yrs ago pt had blood clot TJ    Broken bones     Coagulation disorder     ELIQUIS    Deep vein thrombosis     NO CURRENT ISSUES    Depression     Hemorrhoid     Nephrolithiasis     PMR (polymyalgia rheumatica)     Pulmonary embolism     RESOLVED       Past Surgical History:   Procedure Laterality Date    BACK SURGERY      Lumbar, NO FUSION    COLONOSCOPY      ESOPHAGUS SURGERY      stretching    FEMUR FRACTURE SURGERY Left     ORIF    HERNIA REPAIR Right     IHR    HIP FRACTURE SURGERY Left 2019    KNEE SURGERY Right 2005    ARTHROSCOPY/MENISCUS TEAR    NECK SURGERY      2 DISCS REMOVED ANTERIOR. FUSION,  FULL ROM    SHOULDER SURGERY Left     Left Tear, ARTHROSCOPY       Family History: family history includes Arthritis in his mother; Heart disease in his brother; Leukemia in his mother. Otherwise pertinent FHx was reviewed and not pertinent to current issue.    Social History:  reports that he quit smoking about 52 years ago. His smoking use included cigarettes. He started smoking about 59 years ago. He smoked an average of 1 pack per day. He has never been exposed to tobacco smoke. He has never used smokeless tobacco. He reports that he does not currently use alcohol. He reports that he does not use drugs.    Home Medications:  apixaban, calcium carbonate, lisinopril, metoprolol succinate XL, multivitamin,  predniSONE, and sertraline      Allergies:  Allergies   Allergen Reactions    Codeine Nausea Only and GI Intolerance     Sick at stomach       Objective   Objective     Vitals:      Physical Exam    Constitutional: Awake, alert      Respiratory: Clear to auscultation bilaterally, nonlabored respirations    Cardiovascular: RRR, no murmurs, rubs, or gallops, palpable pedal pulses bilaterally   Gastrointestinal: Positive bowel sounds, soft, nontender, nondistended     Result Review    Result Review:  I have personally reviewed the results from the time of this admission to 2/14/2024 06:45 EST and agree with these findings:  []  Laboratory  []  Microbiology  []  Radiology  []  EKG/Telemetry   []  Cardiology/Vascular   []  Pathology  []  Old records  []  Other:    Assessment & Plan   Assessment / Plan     Brief Patient Summary:  Walker Ordoñez is a 78 y.o. male     Active Hospital Problems:  Active Hospital Problems    Diagnosis     **Nephrolithiasis        Plan:     Cystoscopy with left ureteroscopy with laser and left ureteral stent placement.  Right retrograde pyelogram.  Risks and benefits were discussed including bleeding, infection and damage to the urinary system.  We also discussed the risk of anesthesia up to and including death.  Patient voiced understanding and would like to proceed.    Electronically signed by Marcus Coello MD, 02/14/24, 6:45 AM EST.

## 2024-02-14 NOTE — DISCHARGE INSTRUCTIONS
DISCHARGE INSTRUCTIONS  Extracorporeal Shock Wave Lithotripsy (ESWL)/ Ureteroscopy Lasertripsy      For your surgery you had:  General anesthesia (you may have a sore throat for the first 24 hours)  You may experience dizziness, drowsiness, or lightheadedness for several hours following surgery.  Do not stay alone today or tonight.  Limit your activity for 24 hours.  You should not drive or operate machinery, drink alcohol, or sign legally binding documents for 24 hours or while you are taking pain medication.  Resume your diet slowly.  Follow any special dietary instructions you may have been given by your doctor.     NOTIFY YOUR DOCTOR IF YOU EXPERIENCE ANY OF THE FOLLOWING:  Temperature greater than 101 degrees Fahrenheit  Shaking Chills  Redness or excessive drainage from incision  Nausea, vomiting and/or pain that is not controlled by prescribed medications  Increase in bleeding or bleeding that is excessive  Unable to urinate in 6 hours after surgery  If unable to reach your doctor, please go to the closest Emergency Room  Strain urine if instructed by physician.  Collect any fragments and take with you on your scheduled appointment. You may pass stone pieces or small blood clots.  Blood in your urine is normal.  It could be light pink to cherry color.  Drink 6-8 glasses of fluid each day to assist with passing of stone fragments.  Back pain is common.  It may feel like a dull ache or back spasm.  Urine will be bloody for several days.  If you have difficulty urinating, try sitting in a bathtub of warm water.    If you have a stent, it must be managed by your urologist.  Do NOT forget.      SPECIAL INSTRUCTIONS:  Remove acosta catheter at home Thursday morning.  Ask at your follow up when to resume your Eliquis.      Last dose of pain medication was given at:   Tylenol (1000mg) last at 7:27am. Do not exceed 4000mg of tylenol in a 24 hour period.  May take norco next at anytime if needed.

## 2024-02-21 DIAGNOSIS — I35.0 NONRHEUMATIC AORTIC VALVE STENOSIS: Primary | ICD-10-CM

## 2024-02-22 NOTE — PROGRESS NOTES
Cytoscopy with Stent Removal     Pre-Procedure Diagnosis:     Nephrolithiasis      Post-Procedural Diagnosis: Same    Procedure Performed: Cystoscopy with Ureteral Stent Removal     Description of the Procedure:      was appropriately identified and brought to the cytoscopy suite. A timeout was undertaken documenting the correct patient, site, and procedure. The patient was prepped in a normal sterile fashion. A flexible cytoscope was then introduced into the bladder. The stent was identified and grasped with endoscopic graspers. The entire stent was removed and passed off the field. Patient tolerated the procedure well. There were no intraprocedural complications.        Assessment and Plan   Diagnoses and all orders for this visit:    1. Nephrolithiasis (Primary)          Current Outpatient Medications:     calcium carbonate (OS-ROE) 600 MG tablet, Take 1 tablet by mouth Daily. LAST DOSE 2/12/24, Disp: , Rfl:     HYDROcodone-acetaminophen (NORCO) 5-325 MG per tablet, Take 1 tablet by mouth Every 6 (Six) Hours As Needed for Moderate Pain (Pain)., Disp: 12 tablet, Rfl: 0    lisinopril (PRINIVIL,ZESTRIL) 20 MG tablet, Take 1 tablet by mouth Daily. (Patient taking differently: Take 1 tablet by mouth Every Night.), Disp: 90 tablet, Rfl: 1    metoprolol succinate XL (TOPROL-XL) 50 MG 24 hr tablet, TAKE 1 TABLET BY MOUTH DAILY (Patient taking differently: Take 1 tablet by mouth Every Night.), Disp: 90 tablet, Rfl: 1    Multiple Vitamin (MULTI-VITAMIN DAILY PO), Take 1 tablet by mouth Daily. LAST DOSE 2/12/24, Disp: , Rfl:     predniSONE (DELTASONE) 1 MG tablet, TAKE 1 TABLET BY MOUTH 4 TIMES A DAY (Patient taking differently: Take 1 tablet by mouth 4 (Four) Times a Day. TAKES FOR TX OF PMR), Disp: 360 tablet, Rfl: 1    sertraline (ZOLOFT) 25 MG tablet, TAKE 1 TABLET BY MOUTH DAILY, Disp: 90 tablet, Rfl: 1      Electronically Signed by: Marcus Coello MD on 02/23/2024

## 2024-02-23 ENCOUNTER — PROCEDURE VISIT (OUTPATIENT)
Dept: UROLOGY | Facility: CLINIC | Age: 79
End: 2024-02-23
Payer: MEDICARE

## 2024-02-23 DIAGNOSIS — N20.0 NEPHROLITHIASIS: Primary | ICD-10-CM

## 2024-02-23 LAB
CALCIUM OXALATE DIHYDRATE MFR STONE IR: 60 %
COLOR STONE: NORMAL
COM MFR STONE: 40 %
COMPN STONE: NORMAL
LABORATORY COMMENT REPORT: NORMAL
LABORATORY COMMENT REPORT: NORMAL
Lab: NORMAL
Lab: NORMAL
PHOTO: NORMAL
SIZE STONE: NORMAL MM
SPEC SOURCE SUBJ: NORMAL
WT STONE: 196 MG

## 2024-02-27 ENCOUNTER — HOSPITAL ENCOUNTER (OUTPATIENT)
Dept: CARDIOLOGY | Facility: HOSPITAL | Age: 79
Discharge: HOME | End: 2024-02-27
Admitting: SPECIALIST
Payer: MEDICARE

## 2024-02-27 DIAGNOSIS — I35.0 NONRHEUMATIC AORTIC VALVE STENOSIS: ICD-10-CM

## 2024-02-27 LAB
BH CV ECHO MEAS - AO MAX PG: 10.3 MMHG
BH CV ECHO MEAS - AO MEAN PG: 6.5 MMHG
BH CV ECHO MEAS - AO V2 MAX: 160.2 CM/SEC
BH CV ECHO MEAS - AO V2 VTI: 38.1 CM
BH CV ECHO MEAS - AVA(I,D): 2.45 CM2
BH CV ECHO MEAS - EDV(CUBED): 67.3 ML
BH CV ECHO MEAS - EDV(MOD-SP2): 59.9 ML
BH CV ECHO MEAS - EDV(MOD-SP4): 75.6 ML
BH CV ECHO MEAS - EF(MOD-BP): 74.8 %
BH CV ECHO MEAS - EF(MOD-SP2): 75.6 %
BH CV ECHO MEAS - EF(MOD-SP4): 72.1 %
BH CV ECHO MEAS - ESV(CUBED): 16.3 ML
BH CV ECHO MEAS - ESV(MOD-SP2): 14.6 ML
BH CV ECHO MEAS - ESV(MOD-SP4): 21.1 ML
BH CV ECHO MEAS - FS: 37.7 %
BH CV ECHO MEAS - IVS/LVPW: 1.15 CM
BH CV ECHO MEAS - IVSD: 1.14 CM
BH CV ECHO MEAS - LA DIMENSION: 3.6 CM
BH CV ECHO MEAS - LAT PEAK E' VEL: 11.4 CM/SEC
BH CV ECHO MEAS - LV DIASTOLIC VOL/BSA (35-75): 35 CM2
BH CV ECHO MEAS - LV MASS(C)D: 143.5 GRAMS
BH CV ECHO MEAS - LV MAX PG: 6 MMHG
BH CV ECHO MEAS - LV MEAN PG: 3.9 MMHG
BH CV ECHO MEAS - LV SYSTOLIC VOL/BSA (12-30): 9.8 CM2
BH CV ECHO MEAS - LV V1 MAX: 122.9 CM/SEC
BH CV ECHO MEAS - LV V1 VTI: 28.6 CM
BH CV ECHO MEAS - LVIDD: 4.1 CM
BH CV ECHO MEAS - LVIDS: 2.5 CM
BH CV ECHO MEAS - LVOT AREA: 3.3 CM2
BH CV ECHO MEAS - LVOT DIAM: 2.04 CM
BH CV ECHO MEAS - LVPWD: 0.99 CM
BH CV ECHO MEAS - MED PEAK E' VEL: 6.9 CM/SEC
BH CV ECHO MEAS - MV A MAX VEL: 91.8 CM/SEC
BH CV ECHO MEAS - MV DEC SLOPE: 270.9 CM/SEC2
BH CV ECHO MEAS - MV DEC TIME: 0.2 SEC
BH CV ECHO MEAS - MV E MAX VEL: 55.1 CM/SEC
BH CV ECHO MEAS - MV E/A: 0.6
BH CV ECHO MEAS - PA V2 MAX: 169.7 CM/SEC
BH CV ECHO MEAS - RVDD: 3.6 CM
BH CV ECHO MEAS - SI(MOD-SP2): 21 ML/M2
BH CV ECHO MEAS - SI(MOD-SP4): 25.3 ML/M2
BH CV ECHO MEAS - SV(LVOT): 93.5 ML
BH CV ECHO MEAS - SV(MOD-SP2): 45.3 ML
BH CV ECHO MEAS - SV(MOD-SP4): 54.5 ML
BH CV ECHO MEAS - TAPSE (>1.6): 2.28 CM
BH CV ECHO MEAS - TR MAX PG: 37 MMHG
BH CV ECHO MEAS - TR MAX VEL: 304.1 CM/SEC
BH CV ECHO MEASUREMENTS AVERAGE E/E' RATIO: 6.02
IVRT: 63 MS
LEFT ATRIUM VOLUME INDEX: 21.9 ML/M2

## 2024-02-27 PROCEDURE — 93306 TTE W/DOPPLER COMPLETE: CPT | Performed by: SPECIALIST

## 2024-02-27 PROCEDURE — 93306 TTE W/DOPPLER COMPLETE: CPT

## 2024-03-11 LAB
QT INTERVAL: 472 MS
QTC INTERVAL: 440 MS

## 2024-03-21 ENCOUNTER — HOSPITAL ENCOUNTER (OUTPATIENT)
Dept: ULTRASOUND IMAGING | Facility: HOSPITAL | Age: 79
Discharge: HOME | End: 2024-03-21
Admitting: UROLOGY
Payer: MEDICARE

## 2024-03-21 DIAGNOSIS — N20.0 NEPHROLITHIASIS: ICD-10-CM

## 2024-03-21 PROCEDURE — 76775 US EXAM ABDO BACK WALL LIM: CPT

## 2024-03-23 NOTE — PROGRESS NOTES
Chief Complaint: Urologic complaint    Subjective         History of Present Illness  Walker Ordoñez is a 78 y.o. male           78-year-old  gentleman       Nephrolithiasis      No pain, no gross hematuria currently      3/24 renal ultrasound-negative    Stent removed in office    2/24 calcium moxa monohydrate 40, calcium moxa dihydrate 60    2/14/2024 left ureteroscopy, right retrograde - stent no string, all stones removed from the left side, normal right retrograde      Voiding ok.  No prostate medication    PVR    1/24    000      No CAD patient does not smoke.  On Eliquis for history of DVT/PE      History of 4 lithotripsies        Previous    10/30/2023 CT stone protocol - no change low-density mass uncinate process pancreas favored to be benign no change since 2009.  Right side has 2 to 3 mm nonobstructing stones.  Left side has 4 stones and 8 mm, three 5 mm all nonobstructing.  Images reviewed.  10/18/2023 UA - 0-2 RBCs, no bacteria, 1.0, GFR 75      510/18 b/l ureteroscopy - normal bladder with some minor trabeculations.  Both sides were free of stone at the end of the procedure.    5/18 calcium oxalate dihydrate 30%, calcium oxalate monohydrate 60%, calcium phosphate 10%    5/4/18 CT abdomen/pelvis without  8.5 mm proximal right ureteral calculus.  3 other stones in the right kidney.  A 9 mm, 3 mm and 4 mm.  Left side has a couple of stones that amount to about a 1.5 x 2 cm stone.  All nonobstructing                Objective             Current Outpatient Medications:     calcium carbonate (OS-ROE) 600 MG tablet, Take 1 tablet by mouth Daily. LAST DOSE 2/12/24, Disp: , Rfl:     HYDROcodone-acetaminophen (NORCO) 5-325 MG per tablet, Take 1 tablet by mouth Every 6 (Six) Hours As Needed for Moderate Pain (Pain)., Disp: 12 tablet, Rfl: 0    lisinopril (PRINIVIL,ZESTRIL) 20 MG tablet, Take 1 tablet by mouth Daily. (Patient taking differently: Take 1 tablet by mouth Every Night.), Disp: 90 tablet, Rfl:  1    metoprolol succinate XL (TOPROL-XL) 50 MG 24 hr tablet, TAKE 1 TABLET BY MOUTH DAILY (Patient taking differently: Take 1 tablet by mouth Every Night.), Disp: 90 tablet, Rfl: 1    Multiple Vitamin (MULTI-VITAMIN DAILY PO), Take 1 tablet by mouth Daily. LAST DOSE 24, Disp: , Rfl:     predniSONE (DELTASONE) 1 MG tablet, TAKE 1 TABLET BY MOUTH 4 TIMES A DAY (Patient taking differently: Take 1 tablet by mouth 4 (Four) Times a Day. TAKES FOR TX OF PMR), Disp: 360 tablet, Rfl: 1    sertraline (ZOLOFT) 25 MG tablet, TAKE 1 TABLET BY MOUTH DAILY, Disp: 90 tablet, Rfl: 1    Allergies   Allergen Reactions    Codeine Nausea Only and GI Intolerance     Sick at stomach        Family History   Problem Relation Age of Onset    Arthritis Mother     Leukemia Mother     Heart disease Brother     Malig Hyperthermia Neg Hx        Social History     Socioeconomic History    Marital status:    Tobacco Use    Smoking status: Former     Current packs/day: 0.00     Average packs/day: 1 pack/day for 7.0 years (7.0 ttl pk-yrs)     Types: Cigarettes     Start date: 1965     Quit date: 1972     Years since quittin.2     Passive exposure: Never    Smokeless tobacco: Never    Tobacco comments:     Quit 46 years ago   Vaping Use    Vaping status: Never Used   Substance and Sexual Activity    Alcohol use: Not Currently    Drug use: Never    Sexual activity: Defer                 Assessment and Plan    Diagnoses and all orders for this visit:    1. Nephrolithiasis (Primary)      Nephrolithiasis/gross hematuria      The patient was counseled on the preventative measures of kidney stones today.  This included increasing fluid intake to make at least 1.5 ml daily, decreasing meat intake, decreasing salt intake and taking in a normal amount of calcium (1000 mg daily).  Information handout given on this today.       We also discussed the DASH diet today for stone prevention and handout was given.    F/u as needed.

## 2024-03-25 ENCOUNTER — OFFICE VISIT (OUTPATIENT)
Dept: UROLOGY | Facility: CLINIC | Age: 79
End: 2024-03-25
Payer: MEDICARE

## 2024-03-25 VITALS — WEIGHT: 188.71 LBS | BODY MASS INDEX: 22.98 KG/M2 | RESPIRATION RATE: 14 BRPM | HEIGHT: 76 IN

## 2024-03-25 DIAGNOSIS — N20.0 NEPHROLITHIASIS: Primary | ICD-10-CM

## 2024-03-25 RX ORDER — APIXABAN 5 MG/1
5 TABLET, FILM COATED ORAL 2 TIMES DAILY
COMMUNITY
Start: 2024-02-28

## 2024-04-26 ENCOUNTER — LAB (OUTPATIENT)
Dept: LAB | Facility: HOSPITAL | Age: 79
End: 2024-04-26
Payer: MEDICARE

## 2024-04-26 ENCOUNTER — OFFICE VISIT (OUTPATIENT)
Dept: FAMILY MEDICINE CLINIC | Facility: CLINIC | Age: 79
End: 2024-04-26
Payer: MEDICARE

## 2024-04-26 VITALS
WEIGHT: 188 LBS | OXYGEN SATURATION: 98 % | BODY MASS INDEX: 22.89 KG/M2 | TEMPERATURE: 97.4 F | HEART RATE: 66 BPM | SYSTOLIC BLOOD PRESSURE: 145 MMHG | HEIGHT: 76 IN | DIASTOLIC BLOOD PRESSURE: 78 MMHG

## 2024-04-26 DIAGNOSIS — Z12.11 ENCOUNTER FOR SCREENING FOR MALIGNANT NEOPLASM OF COLON: ICD-10-CM

## 2024-04-26 DIAGNOSIS — I10 HYPERTENSION, ESSENTIAL: Primary | ICD-10-CM

## 2024-04-26 DIAGNOSIS — I26.99 PULMONARY EMBOLISM, UNSPECIFIED CHRONICITY, UNSPECIFIED PULMONARY EMBOLISM TYPE, UNSPECIFIED WHETHER ACUTE COR PULMONALE PRESENT: ICD-10-CM

## 2024-04-26 LAB
ALBUMIN SERPL-MCNC: 4.1 G/DL (ref 3.5–5.2)
ALBUMIN/GLOB SERPL: 1.3 G/DL
ALP SERPL-CCNC: 65 U/L (ref 39–117)
ALT SERPL W P-5'-P-CCNC: 14 U/L (ref 1–41)
ANION GAP SERPL CALCULATED.3IONS-SCNC: 10.5 MMOL/L (ref 5–15)
AST SERPL-CCNC: 26 U/L (ref 1–40)
BILIRUB SERPL-MCNC: 0.4 MG/DL (ref 0–1.2)
BUN SERPL-MCNC: 17 MG/DL (ref 8–23)
BUN/CREAT SERPL: 18.1 (ref 7–25)
CALCIUM SPEC-SCNC: 10.1 MG/DL (ref 8.6–10.5)
CHLORIDE SERPL-SCNC: 104 MMOL/L (ref 98–107)
CHOLEST SERPL-MCNC: 169 MG/DL (ref 0–200)
CO2 SERPL-SCNC: 28.5 MMOL/L (ref 22–29)
CREAT SERPL-MCNC: 0.94 MG/DL (ref 0.76–1.27)
DEPRECATED RDW RBC AUTO: 47.9 FL (ref 37–54)
EGFRCR SERPLBLD CKD-EPI 2021: 81.9 ML/MIN/1.73
ERYTHROCYTE [DISTWIDTH] IN BLOOD BY AUTOMATED COUNT: 13.1 % (ref 12.3–15.4)
GLOBULIN UR ELPH-MCNC: 3.1 GM/DL
GLUCOSE SERPL-MCNC: 86 MG/DL (ref 65–99)
HCT VFR BLD AUTO: 45.8 % (ref 37.5–51)
HDLC SERPL-MCNC: 31 MG/DL (ref 40–60)
HGB BLD-MCNC: 15.1 G/DL (ref 13–17.7)
LDLC SERPL CALC-MCNC: 92 MG/DL (ref 0–100)
LDLC/HDLC SERPL: 2.69 {RATIO}
MCH RBC QN AUTO: 32.5 PG (ref 26.6–33)
MCHC RBC AUTO-ENTMCNC: 33 G/DL (ref 31.5–35.7)
MCV RBC AUTO: 98.5 FL (ref 79–97)
PLATELET # BLD AUTO: 299 10*3/MM3 (ref 140–450)
PMV BLD AUTO: 9.8 FL (ref 6–12)
POTASSIUM SERPL-SCNC: 5 MMOL/L (ref 3.5–5.2)
PROT SERPL-MCNC: 7.2 G/DL (ref 6–8.5)
RBC # BLD AUTO: 4.65 10*6/MM3 (ref 4.14–5.8)
SODIUM SERPL-SCNC: 143 MMOL/L (ref 136–145)
TRIGL SERPL-MCNC: 273 MG/DL (ref 0–150)
VLDLC SERPL-MCNC: 46 MG/DL (ref 5–40)
WBC NRBC COR # BLD AUTO: 9.27 10*3/MM3 (ref 3.4–10.8)

## 2024-04-26 PROCEDURE — 80053 COMPREHEN METABOLIC PANEL: CPT | Performed by: FAMILY MEDICINE

## 2024-04-26 PROCEDURE — 85027 COMPLETE CBC AUTOMATED: CPT | Performed by: FAMILY MEDICINE

## 2024-04-26 PROCEDURE — 80061 LIPID PANEL: CPT | Performed by: FAMILY MEDICINE

## 2024-04-26 NOTE — ASSESSMENT & PLAN NOTE
His blood pressures at home are usually pretty good.  Today's is okay.  Will continue his current meds and update his labs.

## 2024-04-26 NOTE — PROGRESS NOTES
Chief Complaint   Patient presents with    Follow-up     6 month     Hypertension        Subjective     Walker Ordoñez  has a past medical history of Anxiety, Arthritis, Blood clotting disorder (04/27/2017), Broken bones, Coagulation disorder, Deep vein thrombosis, Depression, Hemorrhoid, Nephrolithiasis, PMR (polymyalgia rheumatica), and Pulmonary embolism.    Hypertension-he does check his blood pressure at home.  He states is typically in the 130s over 60s.  He is taking his medication every day.    History of pulmonary embolism-he is doing well.  He denies any unexplained chest pain shortness of breath or lower extremity edema.  He takes his Eliquis twice a day every day.  He denies any recurrent nosebleeds, gross hematuria, or blood per rectum.        PHQ-2 Depression Screening  Little interest or pleasure in doing things?     Feeling down, depressed, or hopeless?     PHQ-2 Total Score     PHQ-9 Depression Screening  Little interest or pleasure in doing things?     Feeling down, depressed, or hopeless?     Trouble falling or staying asleep, or sleeping too much?     Feeling tired or having little energy?     Poor appetite or overeating?     Feeling bad about yourself - or that you are a failure or have let yourself or your family down?     Trouble concentrating on things, such as reading the newspaper or watching television?     Moving or speaking so slowly that other people could have noticed? Or the opposite - being so fidgety or restless that you have been moving around a lot more than usual?     Thoughts that you would be better off dead, or of hurting yourself in some way?     PHQ-9 Total Score     If you checked off any problems, how difficult have these problems made it for you to do your work, take care of things at home, or get along with other people?       Allergies   Allergen Reactions    Codeine Nausea Only and GI Intolerance     Sick at stomach       Prior to Admission medications    Medication Sig  Start Date End Date Taking? Authorizing Provider   calcium carbonate (OS-ROE) 600 MG tablet Take 1 tablet by mouth Daily. LAST DOSE 2/12/24   Yes Eva Garza MD   Eliquis 5 MG tablet tablet Take 1 tablet by mouth 2 (Two) Times a Day. 2/28/24  Yes Eva Garza MD   lisinopril (PRINIVIL,ZESTRIL) 20 MG tablet Take 1 tablet by mouth Daily.  Patient taking differently: Take 1 tablet by mouth Every Night. 1/15/24  Yes Tanvir Garcia DO   metoprolol succinate XL (TOPROL-XL) 50 MG 24 hr tablet TAKE 1 TABLET BY MOUTH DAILY  Patient taking differently: Take 1 tablet by mouth Every Night. 11/14/23  Yes Tanvir Garcia DO   Multiple Vitamin (MULTI-VITAMIN DAILY PO) Take 1 tablet by mouth Daily. LAST DOSE 2/12/24   Yes Eva Garza MD   predniSONE (DELTASONE) 1 MG tablet TAKE 1 TABLET BY MOUTH 4 TIMES A DAY  Patient taking differently: Take 1 tablet by mouth 4 (Four) Times a Day. TAKES FOR TX OF PMR 1/11/24  Yes Tanvir Garcia DO   sertraline (ZOLOFT) 25 MG tablet TAKE 1 TABLET BY MOUTH DAILY 11/14/23  Yes Tanvir Garcia DO        Patient Active Problem List   Diagnosis    Long term current use of systemic steroids    Hypertension, essential    PMR (polymyalgia rheumatica)    Depression    Pulmonary embolism    Pneumonia    Hospital discharge follow-up    Anxiety    Arthritis    Broken bones    Coagulation disorder    Hemorrhoid    Kidney stone    Nephrolithiasis    Cough    Primary osteoarthritis of left knee    Fall (on) (from) other stairs and steps, initial encounter    Sprain of tibiofibular ligament of left ankle    Skin tear of right elbow without complication    Dizziness    Asymptomatic microscopic hematuria    Gross hematuria        Past Surgical History:   Procedure Laterality Date    BACK SURGERY      Lumbar, NO FUSION    COLONOSCOPY  2019    ESOPHAGUS SURGERY      stretching    FEMUR FRACTURE SURGERY Left     ORIF    HERNIA REPAIR Right     IHR     "HIP FRACTURE SURGERY Left     KNEE SURGERY Right     ARTHROSCOPY/MENISCUS TEAR    NECK SURGERY      2 DISCS REMOVED ANTERIOR. FUSION,  FULL ROM    SHOULDER SURGERY Left     Left Tear, ARTHROSCOPY    URETEROSCOPY LASER LITHOTRIPSY WITH STENT INSERTION Left 2024    Procedure: cystoscopy with left ureteroscopy with laser and left ureteral stent placement.  Right retrograde pyelogram;  Surgeon: Marcus Coello MD;  Location: [facility] MAIN OR;  Service: Urology;  Laterality: Left;       Social History     Socioeconomic History    Marital status:    Tobacco Use    Smoking status: Former     Current packs/day: 0.00     Average packs/day: 1 pack/day for 7.0 years (7.0 ttl pk-yrs)     Types: Cigarettes     Start date: 1965     Quit date: 1972     Years since quittin.3     Passive exposure: Never    Smokeless tobacco: Never    Tobacco comments:     Quit 46 years ago   Vaping Use    Vaping status: Never Used   Substance and Sexual Activity    Alcohol use: Not Currently    Drug use: Never    Sexual activity: Defer       Family History   Problem Relation Age of Onset    Arthritis Mother     Leukemia Mother     Heart disease Brother     Malig Hyperthermia Neg Hx        Family history, surgical history, past medical history, Allergies and meds reviewed with patient today and updated in [employer] EMR.     ROS:  Review of Systems   Constitutional:  Negative for fatigue.   HENT:  Negative for nosebleeds.    Respiratory:  Negative for cough, chest tightness, shortness of breath and wheezing.    Cardiovascular:  Negative for chest pain and palpitations.   Neurological:  Negative for headache.   Psychiatric/Behavioral:  Negative for depressed mood. The patient is not nervous/anxious.        OBJECTIVE:  Vitals:    24 1340   BP: 145/78   BP Location: Left arm   Patient Position: Sitting   Pulse: 66   Temp: 97.4 °F (36.3 °C)   SpO2: 98%   Weight: 85.3 kg (188 lb)   Height: 193 cm (76\")     No results " found.   Body mass index is 22.88 kg/m².  No LMP for male patient.    The ASCVD Risk score (Emelina DK, et al., 2019) failed to calculate for the following reasons:    The 2019 ASCVD risk score is only valid for ages 40 to 79     Physical Exam  Vitals and nursing note reviewed.   Constitutional:       General: He is not in acute distress.     Appearance: Normal appearance. He is normal weight.   HENT:      Head: Normocephalic.   Cardiovascular:      Rate and Rhythm: Normal rate and regular rhythm.      Heart sounds: Murmur heard.      Systolic murmur is present with a grade of 1/6.   Pulmonary:      Effort: Pulmonary effort is normal.      Breath sounds: Normal breath sounds. No wheezing or rhonchi.   Neurological:      Mental Status: He is alert and oriented to person, place, and time.   Psychiatric:         Mood and Affect: Mood normal.         Thought Content: Thought content normal.         Judgment: Judgment normal.         Procedures    No visits with results within 30 Day(s) from this visit.   Latest known visit with results is:   Hospital Outpatient Visit on 02/27/2024   Component Date Value Ref Range Status    EF(MOD-bp) 02/27/2024 74.8  % Final    LVIDd 02/27/2024 4.1  cm Final    LVIDs 02/27/2024 2.5  cm Final    IVSd 02/27/2024 1.14  cm Final    LVPWd 02/27/2024 0.99  cm Final    FS 02/27/2024 37.7  % Final    IVS/LVPW 02/27/2024 1.15  cm Final    ESV(cubed) 02/27/2024 16.3  ml Final    LV Sys Vol (BSA corrected) 02/27/2024 9.8  cm2 Final    EDV(cubed) 02/27/2024 67.3  ml Final    LV [place] Vol (BSA corrected) 02/27/2024 35.0  cm2 Final    LV mass(C)d 02/27/2024 143.5  grams Final    LVOT area 02/27/2024 3.3  cm2 Final    LVOT diam 02/27/2024 2.04  cm Final    EDV(MOD-sp2) 02/27/2024 59.9  ml Final    EDV(MOD-sp4) 02/27/2024 75.6  ml Final    ESV(MOD-sp2) 02/27/2024 14.6  ml Final    ESV(MOD-sp4) 02/27/2024 21.1  ml Final    SV(MOD-sp2) 02/27/2024 45.3  ml Final    SV(MOD-sp4) 02/27/2024 54.5  ml Final     SVi(MOD-SP2) 02/27/2024 21.0  ml/m2 Final    SVi(MOD-SP4) 02/27/2024 25.3  ml/m2 Final    EF(MOD-sp2) 02/27/2024 75.6  % Final    EF(MOD-sp4) 02/27/2024 72.1  % Final    MV E max joel 02/27/2024 55.1  cm/sec Final    MV A max joel 02/27/2024 91.8  cm/sec Final    MV dec time 02/27/2024 0.20  sec Final    MV E/A 02/27/2024 0.60   Final    LA ESV Index (BP) 02/27/2024 21.9  ml/m2 Final    Med Peak E' Joel 02/27/2024 6.9  cm/sec Final    Lat Peak E' Joel 02/27/2024 11.4  cm/sec Final    TR max joel 02/27/2024 304.1  cm/sec Final    Avg E/e' ratio 02/27/2024 6.02   Final    SV(LVOT) 02/27/2024 93.5  ml Final    RVIDd 02/27/2024 3.6  cm Final    TAPSE (>1.6) 02/27/2024 2.28  cm Final    LA dimension (2D)  02/27/2024 3.6  cm Final    LV V1 max 02/27/2024 122.9  cm/sec Final    LV V1 max PG 02/27/2024 6.0  mmHg Final    LV V1 mean PG 02/27/2024 3.9  mmHg Final    LV V1 VTI 02/27/2024 28.6  cm Final    Ao pk joel 02/27/2024 160.2  cm/sec Final    Ao max PG 02/27/2024 10.3  mmHg Final    Ao mean PG 02/27/2024 6.5  mmHg Final    Ao V2 VTI 02/27/2024 38.1  cm Final    BRITANY(I,D) 02/27/2024 2.45  cm2 Final    MV dec slope 02/27/2024 270.9  cm/sec2 Final    TR max PG 02/27/2024 37.0  mmHg Final    PA V2 max 02/27/2024 169.7  cm/sec Final    IVRT 02/27/2024 63.0  ms Final       ASSESSMENT/ PLAN:    Diagnoses and all orders for this visit:    1. Hypertension, essential (Primary)  Assessment & Plan:  His blood pressures at home are usually pretty good.  Today's is okay.  Will continue his current meds and update his labs.    Orders:  -     Comprehensive Metabolic Panel  -     Lipid Panel  -     CBC (No Diff)    2. Pulmonary embolism, unspecified chronicity, unspecified pulmonary embolism type, unspecified whether acute cor pulmonale present  Assessment & Plan:  He is doing well and continues on his Eliquis routinely.      3. Encounter for screening for malignant neoplasm of colon        BMI is within normal parameters. No other follow-up  for BMI required.      Orders Placed Today:     No orders of the defined types were placed in this encounter.       Management Plan:     An After Visit Summary was printed and given to the patient at discharge.    Follow-up: Return in about 6 months (around 10/26/2024) for Recheck.    Tanvir Garcia DO 4/26/2024 13:53 EDT  This note was electronically signed.

## 2024-05-07 ENCOUNTER — OFFICE VISIT (OUTPATIENT)
Dept: CARDIOLOGY | Facility: CLINIC | Age: 79
End: 2024-05-07
Payer: MEDICARE

## 2024-05-07 VITALS
DIASTOLIC BLOOD PRESSURE: 72 MMHG | HEIGHT: 76 IN | WEIGHT: 187 LBS | HEART RATE: 66 BPM | BODY MASS INDEX: 22.77 KG/M2 | SYSTOLIC BLOOD PRESSURE: 142 MMHG

## 2024-05-07 DIAGNOSIS — R00.2 PALPITATIONS: Primary | ICD-10-CM

## 2024-05-07 DIAGNOSIS — I10 HYPERTENSION, ESSENTIAL: ICD-10-CM

## 2024-05-07 PROCEDURE — 3078F DIAST BP <80 MM HG: CPT | Performed by: SPECIALIST

## 2024-05-07 PROCEDURE — 3077F SYST BP >= 140 MM HG: CPT | Performed by: SPECIALIST

## 2024-05-07 PROCEDURE — 99214 OFFICE O/P EST MOD 30 MIN: CPT | Performed by: SPECIALIST

## 2024-05-09 RX ORDER — SERTRALINE HYDROCHLORIDE 25 MG/1
25 TABLET, FILM COATED ORAL DAILY
Qty: 90 TABLET | Refills: 1 | Status: SHIPPED | OUTPATIENT
Start: 2024-05-09

## 2024-05-15 RX ORDER — METOPROLOL SUCCINATE 50 MG/1
50 TABLET, EXTENDED RELEASE ORAL DAILY
Qty: 90 TABLET | Refills: 1 | Status: SHIPPED | OUTPATIENT
Start: 2024-05-15

## 2024-07-10 RX ORDER — LISINOPRIL 20 MG/1
20 TABLET ORAL DAILY
Qty: 90 TABLET | Refills: 3 | Status: SHIPPED | OUTPATIENT
Start: 2024-07-10

## 2024-07-31 RX ORDER — PREDNISONE 1 MG/1
1 TABLET ORAL 4 TIMES DAILY
Qty: 360 TABLET | Refills: 1 | Status: SHIPPED | OUTPATIENT
Start: 2024-07-31

## 2024-10-28 ENCOUNTER — OFFICE VISIT (OUTPATIENT)
Dept: FAMILY MEDICINE CLINIC | Facility: CLINIC | Age: 79
End: 2024-10-28
Payer: MEDICARE

## 2024-10-28 VITALS
HEIGHT: 76 IN | HEART RATE: 61 BPM | OXYGEN SATURATION: 97 % | BODY MASS INDEX: 23.87 KG/M2 | SYSTOLIC BLOOD PRESSURE: 158 MMHG | TEMPERATURE: 97 F | DIASTOLIC BLOOD PRESSURE: 71 MMHG | WEIGHT: 196 LBS

## 2024-10-28 DIAGNOSIS — I35.0 NONRHEUMATIC AORTIC VALVE STENOSIS: ICD-10-CM

## 2024-10-28 DIAGNOSIS — I26.99 PULMONARY EMBOLISM, UNSPECIFIED CHRONICITY, UNSPECIFIED PULMONARY EMBOLISM TYPE, UNSPECIFIED WHETHER ACUTE COR PULMONALE PRESENT: ICD-10-CM

## 2024-10-28 DIAGNOSIS — I10 HYPERTENSION, ESSENTIAL: Primary | ICD-10-CM

## 2024-10-28 PROCEDURE — 80061 LIPID PANEL: CPT | Performed by: FAMILY MEDICINE

## 2024-10-28 PROCEDURE — 80053 COMPREHEN METABOLIC PANEL: CPT | Performed by: FAMILY MEDICINE

## 2024-10-28 PROCEDURE — 85027 COMPLETE CBC AUTOMATED: CPT | Performed by: FAMILY MEDICINE

## 2024-10-28 NOTE — ASSESSMENT & PLAN NOTE
He is doing well without any recurrent signs or symptoms of thromboembolic events.  He will continue his Eliquis twice daily.

## 2024-10-28 NOTE — PROGRESS NOTES
Chief Complaint   Patient presents with    Follow-up     6 month     Hypertension        Subjective     Walker Ordoñez  has a past medical history of Anxiety, Arthritis, Blood clotting disorder (04/27/2017), Broken bones, Coagulation disorder, Deep vein thrombosis, Depression, Hemorrhoid, Nephrolithiasis, PMR (polymyalgia rheumatica), and Pulmonary embolism.    Hypertension-he does check his blood pressure at home.  He states his home blood pressure readings are typically about 130 over 70s.    History of pulmonary embolism-he does take his Eliquis 5 mg twice a day every day.  He denies any unexplained cough wheezing or shortness of breath.  He denies any lower extremity swelling.        PHQ-2 Depression Screening  Little interest or pleasure in doing things?     Feeling down, depressed, or hopeless?     PHQ-2 Total Score     PHQ-9 Depression Screening  Little interest or pleasure in doing things?     Feeling down, depressed, or hopeless?     Trouble falling or staying asleep, or sleeping too much?     Feeling tired or having little energy?     Poor appetite or overeating?     Feeling bad about yourself - or that you are a failure or have let yourself or your family down?     Trouble concentrating on things, such as reading the newspaper or watching television?     Moving or speaking so slowly that other people could have noticed? Or the opposite - being so fidgety or restless that you have been moving around a lot more than usual?     Thoughts that you would be better off dead, or of hurting yourself in some way?     PHQ-9 Total Score     If you checked off any problems, how difficult have these problems made it for you to do your work, take care of things at home, or get along with other people?       Allergies   Allergen Reactions    Codeine Nausea Only and GI Intolerance     Sick at stomach       Prior to Admission medications    Medication Sig Start Date End Date Taking? Authorizing Provider   calcium carbonate  (OS-ROE) 600 MG tablet Take 1 tablet by mouth Daily. LAST DOSE 2/12/24   Yes ProviderEva MD   Eliquis 5 MG tablet tablet Take 1 tablet by mouth 2 (Two) Times a Day. 2/28/24  Yes ProviderEva MD   lisinopril (PRINIVIL,ZESTRIL) 20 MG tablet TAKE 1 TABLET BY MOUTH DAILY 7/10/24  Yes Tanvir Garcia DO   metoprolol succinate XL (TOPROL-XL) 50 MG 24 hr tablet Take 1 tablet by mouth Daily. 5/15/24  Yes Tanvir Garcia DO   Multiple Vitamin (MULTI-VITAMIN DAILY PO) Take 1 tablet by mouth Daily. LAST DOSE 2/12/24   Yes ProviderEva MD   predniSONE (DELTASONE) 1 MG tablet TAKE 1 TABLET BY MOUTH 4 TIMES A DAY  Patient taking differently: Take 1 tablet by mouth 3 times a day. 7/31/24  Yes Tanvir Garcia DO   sertraline (ZOLOFT) 25 MG tablet TAKE 1 TABLET BY MOUTH DAILY 5/9/24  Yes Tanvir Garcia DO        Patient Active Problem List   Diagnosis    Long term current use of systemic steroids    Hypertension, essential    PMR (polymyalgia rheumatica)    Depression    Pulmonary embolism    Pneumonia    Hospital discharge follow-up    Anxiety    Arthritis    Broken bones    Coagulation disorder    Hemorrhoid    Kidney stone    Nephrolithiasis    Cough    Primary osteoarthritis of left knee    Fall (on) (from) other stairs and steps, initial encounter    Sprain of tibiofibular ligament of left ankle    Skin tear of right elbow without complication    Dizziness    Asymptomatic microscopic hematuria    Gross hematuria    Nonrheumatic aortic valve stenosis        Past Surgical History:   Procedure Laterality Date    BACK SURGERY      Lumbar, NO FUSION    COLONOSCOPY  2019    ESOPHAGUS SURGERY      stretching    FEMUR FRACTURE SURGERY Left     ORIF    HERNIA REPAIR Right     IHR    HIP FRACTURE SURGERY Left 2019    KNEE SURGERY Right 2005    ARTHROSCOPY/MENISCUS TEAR    NECK SURGERY      2 DISCS REMOVED ANTERIOR. FUSION,  FULL ROM    SHOULDER SURGERY Left 2000    Left  "Tear, ARTHROSCOPY    URETEROSCOPY LASER LITHOTRIPSY WITH STENT INSERTION Left 2024    Procedure: cystoscopy with left ureteroscopy with laser and left ureteral stent placement.  Right retrograde pyelogram;  Surgeon: Marcus Coello MD;  Location: [facility] MAIN OR;  Service: Urology;  Laterality: Left;       Social History     Socioeconomic History    Marital status:    Tobacco Use    Smoking status: Former     Current packs/day: 0.00     Average packs/day: 1 pack/day for 7.0 years (7.0 ttl pk-yrs)     Types: Cigarettes     Start date: 1965     Quit date: 1972     Years since quittin.8     Passive exposure: Never    Smokeless tobacco: Never    Tobacco comments:     Quit 46 years ago   Vaping Use    Vaping status: Never Used   Substance and Sexual Activity    Alcohol use: Not Currently    Drug use: Never    Sexual activity: Defer       Family History   Problem Relation Age of Onset    Arthritis Mother     Leukemia Mother     Heart disease Brother     Malig Hyperthermia Neg Hx        Family history, surgical history, past medical history, Allergies and meds reviewed with patient today and updated in [employer] EMR.     ROS:  Review of Systems   Constitutional:  Negative for fatigue.   HENT:  Negative for nosebleeds.    Respiratory:  Negative for cough, chest tightness, shortness of breath and wheezing.    Cardiovascular:  Negative for chest pain, palpitations and leg swelling.   Gastrointestinal:  Negative for blood in stool.   Genitourinary:  Negative for hematuria.   Neurological:  Negative for headache.       OBJECTIVE:  Vitals:    10/28/24 1338   BP: 158/71   BP Location: Left arm   Patient Position: Sitting   Pulse: 61   Temp: 97 °F (36.1 °C)   SpO2: 97%   Weight: 88.9 kg (196 lb)   Height: 193 cm (76\")     No results found.   Body mass index is 23.86 kg/m².  No LMP for male patient.    The ASCVD Risk score (Emelina DK, et al., 2019) failed to calculate for the following reasons:    The 2019 " ASCVD risk score is only valid for ages 40 to 79     Physical Exam  Vitals and nursing note reviewed.   Constitutional:       General: He is not in acute distress.     Appearance: Normal appearance. He is normal weight.   HENT:      Head: Normocephalic.   Cardiovascular:      Rate and Rhythm: Normal rate and regular rhythm.      Heart sounds: Normal heart sounds. Murmur heard.       with a grade of 3/6.   Pulmonary:      Effort: Pulmonary effort is normal.      Breath sounds: Normal breath sounds. No wheezing or rhonchi.   Neurological:      Mental Status: He is alert and oriented to person, place, and time.   Psychiatric:         Mood and Affect: Mood normal.         Thought Content: Thought content normal.         Judgment: Judgment normal.         Procedures    No visits with results within 30 Day(s) from this visit.   Latest known visit with results is:   Office Visit on 04/26/2024   Component Date Value Ref Range Status    Glucose 04/26/2024 86  65 - 99 mg/dL Final    BUN 04/26/2024 17  8 - 23 mg/dL Final    Creatinine 04/26/2024 0.94  0.76 - 1.27 mg/dL Final    Sodium 04/26/2024 143  136 - 145 mmol/L Final    Potassium 04/26/2024 5.0  3.5 - 5.2 mmol/L Final    Chloride 04/26/2024 104  98 - 107 mmol/L Final    CO2 04/26/2024 28.5  22.0 - 29.0 mmol/L Final    Calcium 04/26/2024 10.1  8.6 - 10.5 mg/dL Final    Total Protein 04/26/2024 7.2  6.0 - 8.5 g/dL Final    Albumin 04/26/2024 4.1  3.5 - 5.2 g/dL Final    ALT (SGPT) 04/26/2024 14  1 - 41 U/L Final    AST (SGOT) 04/26/2024 26  1 - 40 U/L Final    Alkaline Phosphatase 04/26/2024 65  39 - 117 U/L Final    Total Bilirubin 04/26/2024 0.4  0.0 - 1.2 mg/dL Final    Globulin 04/26/2024 3.1  gm/dL Final    A/G Ratio 04/26/2024 1.3  g/dL Final    BUN/Creatinine Ratio 04/26/2024 18.1  7.0 - 25.0 Final    Anion Gap 04/26/2024 10.5  5.0 - 15.0 mmol/L Final    eGFR 04/26/2024 81.9  >60.0 mL/min/1.73 Final    Total Cholesterol 04/26/2024 169  0 - 200 mg/dL Final     Triglycerides 04/26/2024 273 (H)  0 - 150 mg/dL Final    HDL Cholesterol 04/26/2024 31 (L)  40 - 60 mg/dL Final    LDL Cholesterol  04/26/2024 92  0 - 100 mg/dL Final    VLDL Cholesterol 04/26/2024 46 (H)  5 - 40 mg/dL Final    LDL/HDL Ratio 04/26/2024 2.69   Final    WBC 04/26/2024 9.27  3.40 - 10.80 10*3/mm3 Final    RBC 04/26/2024 4.65  4.14 - 5.80 10*6/mm3 Final    Hemoglobin 04/26/2024 15.1  13.0 - 17.7 g/dL Final    Hematocrit 04/26/2024 45.8  37.5 - 51.0 % Final    MCV 04/26/2024 98.5 (H)  79.0 - 97.0 fL Final    MCH 04/26/2024 32.5  26.6 - 33.0 pg Final    MCHC 04/26/2024 33.0  31.5 - 35.7 g/dL Final    RDW 04/26/2024 13.1  12.3 - 15.4 % Final    RDW-SD 04/26/2024 47.9  37.0 - 54.0 fl Final    MPV 04/26/2024 9.8  6.0 - 12.0 fL Final    Platelets 04/26/2024 299  140 - 450 10*3/mm3 Final       ASSESSMENT/ PLAN:    Diagnoses and all orders for this visit:    1. Hypertension, essential (Primary)  Assessment & Plan:  His blood pressure is little elevated here today but his home blood pressure readings are consistently very good.  Will continue his current meds without change.    Orders:  -     Comprehensive Metabolic Panel  -     Lipid Panel  -     CBC (No Diff)    2. Pulmonary embolism, unspecified chronicity, unspecified pulmonary embolism type, unspecified whether acute cor pulmonale present  Assessment & Plan:  He is doing well without any recurrent signs or symptoms of thromboembolic events.  He will continue his Eliquis twice daily.      3. Nonrheumatic aortic valve stenosis  Assessment & Plan:  He has a noticeable murmur.  He had an echocardiogram earlier in the year that did show some mild aortic stenosis.          BMI is within normal parameters. No other follow-up for BMI required.      Orders Placed Today:     No orders of the defined types were placed in this encounter.       Management Plan:     An After Visit Summary was printed and given to the patient at discharge.    Follow-up: Return in about 6  months (around 4/28/2025) for Recheck.    Tanvir Garcia,  10/28/2024 13:58 EDT  This note was electronically signed.

## 2024-10-28 NOTE — ASSESSMENT & PLAN NOTE
His blood pressure is little elevated here today but his home blood pressure readings are consistently very good.  Will continue his current meds without change.

## 2024-10-28 NOTE — ASSESSMENT & PLAN NOTE
He has a noticeable murmur.  He had an echocardiogram earlier in the year that did show some mild aortic stenosis.

## 2024-10-29 LAB
ALBUMIN SERPL-MCNC: 3.9 G/DL (ref 3.5–5.2)
ALBUMIN/GLOB SERPL: 1.3 G/DL
ALP SERPL-CCNC: 71 U/L (ref 39–117)
ALT SERPL W P-5'-P-CCNC: 12 U/L (ref 1–41)
ANION GAP SERPL CALCULATED.3IONS-SCNC: 8.9 MMOL/L (ref 5–15)
AST SERPL-CCNC: 25 U/L (ref 1–40)
BILIRUB SERPL-MCNC: 0.4 MG/DL (ref 0–1.2)
BUN SERPL-MCNC: 13 MG/DL (ref 8–23)
BUN/CREAT SERPL: 13.1 (ref 7–25)
CALCIUM SPEC-SCNC: 9.9 MG/DL (ref 8.6–10.5)
CHLORIDE SERPL-SCNC: 105 MMOL/L (ref 98–107)
CHOLEST SERPL-MCNC: 164 MG/DL (ref 0–200)
CO2 SERPL-SCNC: 28.1 MMOL/L (ref 22–29)
CREAT SERPL-MCNC: 0.99 MG/DL (ref 0.76–1.27)
DEPRECATED RDW RBC AUTO: 46.6 FL (ref 37–54)
EGFRCR SERPLBLD CKD-EPI 2021: 77 ML/MIN/1.73
ERYTHROCYTE [DISTWIDTH] IN BLOOD BY AUTOMATED COUNT: 13 % (ref 12.3–15.4)
GLOBULIN UR ELPH-MCNC: 3.1 GM/DL
GLUCOSE SERPL-MCNC: 113 MG/DL (ref 65–99)
HCT VFR BLD AUTO: 43.6 % (ref 37.5–51)
HDLC SERPL-MCNC: 28 MG/DL (ref 40–60)
HGB BLD-MCNC: 14.7 G/DL (ref 13–17.7)
LDLC SERPL CALC-MCNC: 77 MG/DL (ref 0–100)
LDLC/HDLC SERPL: 2.23 {RATIO}
MCH RBC QN AUTO: 33.2 PG (ref 26.6–33)
MCHC RBC AUTO-ENTMCNC: 33.7 G/DL (ref 31.5–35.7)
MCV RBC AUTO: 98.4 FL (ref 79–97)
PLATELET # BLD AUTO: 272 10*3/MM3 (ref 140–450)
PMV BLD AUTO: 9.7 FL (ref 6–12)
POTASSIUM SERPL-SCNC: 4.8 MMOL/L (ref 3.5–5.2)
PROT SERPL-MCNC: 7 G/DL (ref 6–8.5)
RBC # BLD AUTO: 4.43 10*6/MM3 (ref 4.14–5.8)
SODIUM SERPL-SCNC: 142 MMOL/L (ref 136–145)
TRIGL SERPL-MCNC: 368 MG/DL (ref 0–150)
VLDLC SERPL-MCNC: 59 MG/DL (ref 5–40)
WBC NRBC COR # BLD AUTO: 9.14 10*3/MM3 (ref 3.4–10.8)

## 2024-10-31 ENCOUNTER — OFFICE VISIT (OUTPATIENT)
Dept: ORTHOPEDIC SURGERY | Facility: CLINIC | Age: 79
End: 2024-10-31
Payer: MEDICARE

## 2024-10-31 VITALS
HEART RATE: 67 BPM | OXYGEN SATURATION: 95 % | DIASTOLIC BLOOD PRESSURE: 70 MMHG | BODY MASS INDEX: 23.75 KG/M2 | WEIGHT: 195 LBS | HEIGHT: 76 IN | SYSTOLIC BLOOD PRESSURE: 140 MMHG

## 2024-10-31 DIAGNOSIS — M25.551 RIGHT HIP PAIN: ICD-10-CM

## 2024-10-31 DIAGNOSIS — M16.11 OSTEOARTHRITIS OF RIGHT HIP, UNSPECIFIED OSTEOARTHRITIS TYPE: Primary | ICD-10-CM

## 2024-10-31 NOTE — PROGRESS NOTES
"Chief Complaint  Initial Evaluation and Pain of the Right Hip     Subjective      Walker Ordoñez presents to [facility] for initial evaluation of the right hip. He is having pain all os a sudden in the right hip.  He had no injury or fall.  He has had pain for about a month and has pain on the lateral aspect of the hip.  He uses heat as needed and that seems to help.  He is on Eliquis.      Allergies   Allergen Reactions    Codeine Nausea Only and GI Intolerance     Sick at stomach        Social History     Socioeconomic History    Marital status:    Tobacco Use    Smoking status: Former     Current packs/day: 0.00     Average packs/day: 1 pack/day for 7.0 years (7.0 ttl pk-yrs)     Types: Cigarettes     Start date: 1965     Quit date: 1972     Years since quittin.8     Passive exposure: Never    Smokeless tobacco: Never    Tobacco comments:     Quit 46 years ago   Vaping Use    Vaping status: Never Used   Substance and Sexual Activity    Alcohol use: Not Currently    Drug use: Never    Sexual activity: Defer        I reviewed the patient's chief complaint, history of present illness, review of systems, past medical history, surgical history, family history, social history, medications, and allergy list.     Review of Systems     Constitutional: Denies fevers, chills, weight loss  Cardiovascular: Denies chest pain, shortness of breath  Skin: Denies rashes, acute skin changes  Neurologic: Denies headache, loss of consciousness        Vital Signs:   /70   Pulse 67   Ht 193 cm (76\")   Wt 88.5 kg (195 lb)   SpO2 95%   BMI 23.74 kg/m²          Physical Exam  General: Alert. No acute distress    Ortho Exam        RIGHT HIP Mildly full ROM of the hip. No skin discoloration, atrophy or swelling. Positive EHL, FHL, GS, and TA. Sensation intact to all 5 nerves of the foot.Negative straight leg raise. Leg lengths appear un-equal The left leg is shorter then the right. " Ambulates with Antalgic gait Negative Ronald. Negative Ousmane. Good strength to hamstrings, quadriceps, dorsiflexors, and plantar flexors. Knee Extensor Mechanism  intact        Procedures      Imaging Results (Most Recent)       Procedure Component Value Units Date/Time    XR Hip With or Without Pelvis 2 - 3 View Right [[id number]] Resulted: 10/31/24 1340     Updated: 10/31/24 1353             Result Review :     X-Ray Report:  Right hip X-Ray  Indication: Evaluation of the right hip   AP/Lateral view(s)  Findings: Severe degenerative changes of the right hip     Previous imaging available for comparison: no        Assessment and Plan     Diagnoses and all orders for this visit:    1. Right hip pain (Primary)  -     XR Hip With or Without Pelvis 2 - 3 View Right    2. Osteoarthritis of right hip, unspecified osteoarthritis type        Discussed the treatment plan with the patient. I reviewed the X-rays that were obtained today with the patient.     HEP exercises.  Prescribed physical therapy. Prescribed topical cream.       Call or return if worsening symptoms.    Follow Up     PRN Discussed injection if pain persists.       Patient was given instructions and counseling regarding his condition or for health maintenance advice. Please see specific information pulled into the AVS if appropriate.     Scribed for Dayan Rodriguez MD by Sadie Hartmann MA.  10/31/24   13:48 EDT    I have personally performed the services described in this document as scribed by the above individual and it is both accurate and complete. Dayan Rodriguez MD 10/31/24

## 2024-11-05 RX ORDER — SERTRALINE HYDROCHLORIDE 25 MG/1
25 TABLET, FILM COATED ORAL DAILY
Qty: 90 TABLET | Refills: 1 | Status: SHIPPED | OUTPATIENT
Start: 2024-11-05

## 2024-11-07 ENCOUNTER — OFFICE VISIT (OUTPATIENT)
Dept: CARDIOLOGY | Facility: CLINIC | Age: 79
End: 2024-11-07
Payer: MEDICARE

## 2024-11-07 VITALS
SYSTOLIC BLOOD PRESSURE: 157 MMHG | HEIGHT: 76 IN | BODY MASS INDEX: 23.87 KG/M2 | DIASTOLIC BLOOD PRESSURE: 81 MMHG | WEIGHT: 196 LBS | HEART RATE: 61 BPM

## 2024-11-07 DIAGNOSIS — I10 HYPERTENSION, ESSENTIAL: Primary | ICD-10-CM

## 2024-11-07 DIAGNOSIS — I35.0 AORTIC STENOSIS, MILD: ICD-10-CM

## 2024-11-07 DIAGNOSIS — Z86.711 HISTORY OF PULMONARY EMBOLISM: ICD-10-CM

## 2024-11-07 PROBLEM — R42 DIZZINESS: Status: RESOLVED | Noted: 2023-08-07 | Resolved: 2024-11-07

## 2024-11-07 PROCEDURE — 99214 OFFICE O/P EST MOD 30 MIN: CPT | Performed by: NURSE PRACTITIONER

## 2024-11-07 PROCEDURE — 3079F DIAST BP 80-89 MM HG: CPT | Performed by: NURSE PRACTITIONER

## 2024-11-07 PROCEDURE — 3077F SYST BP >= 140 MM HG: CPT | Performed by: NURSE PRACTITIONER

## 2024-11-07 PROCEDURE — 1159F MED LIST DOCD IN RCRD: CPT | Performed by: NURSE PRACTITIONER

## 2024-11-07 PROCEDURE — 1160F RVW MEDS BY RX/DR IN RCRD: CPT | Performed by: NURSE PRACTITIONER

## 2024-11-07 NOTE — PROGRESS NOTES
Chief Complaint  Follow-up and Hypertension    Subjective            History of Present Illness  Walker Ordoñez is an 78-year-old male patient who presents to the office today for follow up. He has hypertension, mild aortic stenosis, and history of pulmonary emboli. He is compliant with medications. He checks blood pressures at home which are in the 130/80's range, reports only elevated during office visits. He denies any new or worsening cardiac symptoms today.      PMH  Past Medical History:   Diagnosis Date    Anxiety     Arthritis     Blood clotting disorder 04/27/2017    6 yrs ago pt had blood clot TJ    Broken bones     Coagulation disorder     ELIQUIS    Deep vein thrombosis     NO CURRENT ISSUES    Depression     Hemorrhoid     Nephrolithiasis     PMR (polymyalgia rheumatica)     Pulmonary embolism     RESOLVED         ALLERGY  Allergies   Allergen Reactions    Codeine Nausea Only and GI Intolerance     Sick at stomach          SURGICALHX  Past Surgical History:   Procedure Laterality Date    BACK SURGERY      Lumbar, NO FUSION    COLONOSCOPY  2019    ESOPHAGUS SURGERY      stretching    FEMUR FRACTURE SURGERY Left     ORIF    HERNIA REPAIR Right     IHR    HIP FRACTURE SURGERY Left 2019    KNEE SURGERY Right 2005    ARTHROSCOPY/MENISCUS TEAR    NECK SURGERY      2 DISCS REMOVED ANTERIOR. FUSION,  FULL ROM    SHOULDER SURGERY Left 2000    Left Tear, ARTHROSCOPY    URETEROSCOPY LASER LITHOTRIPSY WITH STENT INSERTION Left 2/14/2024    Procedure: cystoscopy with left ureteroscopy with laser and left ureteral stent placement.  Right retrograde pyelogram;  Surgeon: Marcus Coello MD;  Location: [facility] OR;  Service: Urology;  Laterality: Left;          SOC  Social History     Socioeconomic History    Marital status:    Tobacco Use    Smoking status: Former     Current packs/day: 0.00     Average packs/day: 1 pack/day for 7.0 years (7.0 ttl pk-yrs)     Types: Cigarettes     Start date: 1/1/1965      "Quit date: 1972     Years since quittin.8     Passive exposure: Never    Smokeless tobacco: Never    Tobacco comments:     Quit 46 years ago   Vaping Use    Vaping status: Never Used   Substance and Sexual Activity    Alcohol use: Not Currently    Drug use: Never    Sexual activity: Defer         FAMHX  Family History   Problem Relation Age of Onset    Arthritis Mother     Leukemia Mother     Heart disease Brother     Dinorah Hyperthermia Neg Hx           MEDSIGONLY  Current Outpatient Medications on File Prior to Visit   Medication Sig    calcium carbonate (OS-ROE) 600 MG tablet Take 1 tablet by mouth Daily. LAST DOSE 24    Diclofenac Sodium (VOLTAREN) 1 % gel gel Apply 4 g topically to the appropriate area as directed 4 (Four) Times a Day As Needed (AS NEEDED).    Eliquis 5 MG tablet tablet Take 1 tablet by mouth 2 (Two) Times a Day.    lisinopril (PRINIVIL,ZESTRIL) 20 MG tablet TAKE 1 TABLET BY MOUTH DAILY    metoprolol succinate XL (TOPROL-XL) 50 MG 24 hr tablet Take 1 tablet by mouth Daily.    Multiple Vitamin (MULTI-VITAMIN DAILY PO) Take 1 tablet by mouth Daily. LAST DOSE 24    predniSONE (DELTASONE) 1 MG tablet TAKE 1 TABLET BY MOUTH 4 TIMES A DAY (Patient taking differently: Take 1 tablet by mouth 3 times a day.)    sertraline (ZOLOFT) 25 MG tablet TAKE 1 TABLET BY MOUTH DAILY     No current facility-administered medications on file prior to visit.         Objective   /81   Pulse 61   Ht 193 cm (75.98\")   Wt 88.9 kg (196 lb)   BMI 23.87 kg/m²       Physical Exam  Constitutional:       Appearance: He is normal weight.   HENT:      Head: Normocephalic.   Neck:      Vascular: No carotid bruit.   Cardiovascular:      Rate and Rhythm: Normal rate and regular rhythm.      Pulses: Normal pulses.      Heart sounds: Murmur heard.   Pulmonary:      Effort: Pulmonary effort is normal.      Breath sounds: Normal breath sounds.   Musculoskeletal:      Cervical back: Neck supple.      Right " "lower leg: No edema.      Left lower leg: No edema.   Skin:     General: Skin is dry.   Neurological:      Mental Status: He is alert and oriented to person, place, and time.   Psychiatric:         Behavior: Behavior normal.         Result Review :   The following data was reviewed by: EMERSON Onofre on 11/07/2024:  proBNP   Date Value Ref Range Status   04/23/2022 861.8 0.0 - 1,800.0 pg/mL Final     CMP          10/28/2024    14:12   CMP   Glucose 113    BUN 13    Creatinine 0.99    EGFR 77.0    Sodium 142    Potassium 4.8    Chloride 105    Calcium 9.9    Total Protein 7.0    Albumin 3.9    Globulin 3.1    Total Bilirubin 0.4    Alkaline Phosphatase 71    AST (SGOT) 25    ALT (SGPT) 12    Albumin/Globulin Ratio 1.3    BUN/Creatinine Ratio 13.1    Anion Gap 8.9      CBC w/diff          10/28/2024    14:12   CBC w/Diff   WBC 9.14    RBC 4.43    Hemoglobin 14.7    Hematocrit 43.6    MCV 98.4    MCH 33.2    MCHC 33.7    RDW 13.0    Platelets 272       No results found for: \"TSH\"   No results found for: \"FREET4\"   No results found for: \"DDIMERQUANT\"  Magnesium   Date Value Ref Range Status   04/24/2022 2.1 1.6 - 2.4 mg/dL Final      No results found for: \"DIGOXIN\"   Lab Results   Component Value Date    TROPONINT <0.010 04/23/2022           Lipid Panel          10/28/2024    14:12   Lipid Panel   Total Cholesterol 164    Triglycerides 368    HDL Cholesterol 28    VLDL Cholesterol 59    LDL Cholesterol  77    LDL/HDL Ratio 2.23        Results for orders placed during the hospital encounter of 02/27/24    Adult Transthoracic Echo Complete W/ Cont if Necessary Per Protocol    Interpretation Summary    Left ventricular systolic function is hyperdynamic (EF > 70%). Calculated left ventricular EF = 74.8%    Mild aortic valve stenosis is present.    Estimated right ventricular systolic pressure from tricuspid regurgitation is normal (<35 mmHg).    Trace MR and trace TR.           Assessment and Plan    Diagnoses and " all orders for this visit:    1. Hypertension, essential (Primary)  Mildly elevated today, check blood pressure twice a day for the next two weeks, blood pressure log provided for patient. For now continue lisinopril 20 mg daily and metoprolol 50 mg daily.    2. History of pulmonary embolism  Asymptomatic, continue eliquis 5 mg twice daily.    3. Aortic stenosis, mild  Asymptomatic, continue to monitor with repeat echocardiogram.           Follow Up   Return in about 6 months (around 5/7/2025) for Follow up with Dr Rivera.    Patient was given instructions and counseling regarding his condition or for health maintenance advice. Please see specific information pulled into the AVS if appropriate.     Walker Ordoñez  reports that he quit smoking about 52 years ago. His smoking use included cigarettes. He started smoking about 59 years ago. He has a 7 pack-year smoking history. He has never been exposed to tobacco smoke. He has never used smokeless tobacco.            Deneen Berman, APRN  11/07/24  13:49 EST    Dictated Utilizing Dragon Dictation

## 2024-11-11 RX ORDER — METOPROLOL SUCCINATE 50 MG/1
50 TABLET, EXTENDED RELEASE ORAL DAILY
Qty: 90 TABLET | Refills: 1 | Status: SHIPPED | OUTPATIENT
Start: 2024-11-11

## 2024-11-15 ENCOUNTER — TREATMENT (OUTPATIENT)
Dept: PHYSICAL THERAPY | Facility: CLINIC | Age: 79
End: 2024-11-15
Payer: MEDICARE

## 2024-11-15 DIAGNOSIS — M25.551 RIGHT HIP PAIN: Primary | ICD-10-CM

## 2024-11-15 DIAGNOSIS — M62.81 MUSCLE WEAKNESS OF PROXIMAL EXTREMITY: ICD-10-CM

## 2024-11-15 NOTE — PROGRESS NOTES
"  Physical Therapy Initial Evaluation/Discharge Summary       [facility]: [address] [place] [address]      Patient: Walker Ordoñez   : 1945  Diagnosis/ICD-10 Code:  Right hip pain [M25.551]  Referring practitioner: Dayan Rodriguez MD  Date of Initial Visit: 11/15/2024  Encounter Date:  11/15/2024  Patient seen for 1 sessions           Subjective Questionnaire: LEFS: 67/80      Subjective   Pt reports to therapy w/ complaints of R hip pain. Pt reports the pain came on over night. Pt reports they backed off of their walking and used ibuprofen. The pain was local to the lateral aspect of their R hip. This pain did not travel. No numbness or tingling. Pt reports over the past week they have had no pain and no troubles w/ their R hip. \"I feel like I'm cured\".     Past Medical Hx: L hip replacement in the past, DVT in the past, fractures in the past ( L LE).       Objective          Tenderness     Left Hip   No tenderness in the greater trochanter.     Right Hip   No tenderness in the greater trochanter.     Additional Tenderness Details  Indicates R greater trochanter is location of past pain site.     Active Range of Motion   Left Hip   External rotation (90/90): 23 degrees   Internal rotation (90/90): 15 degrees     Right Hip   External rotation (90/90): 20 degrees   Internal rotation (90/90): 30 degrees     Strength/Myotome Testing     Left Hip   Planes of Motion   Flexion: 4+  Abduction: 5    Right Hip   Planes of Motion   Flexion: 4+  Abduction: 5    Left Knee   Flexion: 5  Extension: 5    Right Knee   Flexion: 5  Extension: 5      See Exercise, Manual, and Modality Logs for complete treatment.       Assessment & Plan       Assessment  Impairments: impaired physical strength   Assessment details: Pt reports to physical therapy after a bout of R hip pain. Pt's symptoms and reports are consistent w/ bursitis. Pt has no pain or complaints at this time. Pt was provided a HEP to utilize for hip " strengthening and stability to prevent future issues. Pt instructed to return to therapy if they have any changes to their symptoms. Pt is discharged w/ their HEP.               PLAN:  Therapy options: no therapy services needed  Treatment plan discussed with: patient      Visit Diagnoses:    ICD-10-CM ICD-9-CM   1. Right hip pain  M25.551 719.45   2. Muscle weakness of proximal extremity  M62.81 728.87       History # of Personal Factors and/or Comorbidities: LOW (0)  Examination of Body System(s): # of elements: LOW (1-2)  Clinical Presentation: STABLE   Clinical Decision Making: LOW       Timed:         Manual Therapy:    0     mins  33309;     Therapeutic Exercise:    15     mins  42973;     Neuromuscular Amor:    0    mins  62330;    Therapeutic Activity:     0     mins  02296;     Gait Trainin     mins  49111;     Ultrasound:     0     mins  57330;    Ionto                               0    mins   68595  Self Care                       0     mins   48833  Canalith Repos    0     mins 95419      Un-Timed:  Electrical Stimulation:    0     mins  40627 (MC );  Dry Needling     0     mins self-pay  Traction     0     mins 36267  Low Eval     10     Mins  55038  Mod Eval     0     Mins  32381  High Eval                       0     Mins  73719  Re-Eval                           0    mins  95475    Timed Treatment:   15   mins   Total Treatment:     25   mins    PT SIGNATURE: Onur Lee PT, DPT    Electronically signed  11/15/2024    [place] License: PT - [id number]    Initial Certification  Certification Period: 11/15/2024 thru 2025  I certify that the therapy services are furnished while this patient is under my care.  The services outlined above are required by this patient, and will be reviewed every 90 days.     PHYSICIAN: Dayan Rodriguez MD  NPI: [id number]      DATE:     Please sign and return via fax to [phone]. Thank you, [facility].

## 2024-11-25 ENCOUNTER — TELEPHONE (OUTPATIENT)
Dept: CARDIOLOGY | Facility: CLINIC | Age: 79
End: 2024-11-25
Payer: MEDICARE

## 2024-11-25 RX ORDER — AMLODIPINE BESYLATE 2.5 MG/1
2.5 TABLET ORAL DAILY
Qty: 90 TABLET | Refills: 3 | Status: SHIPPED | OUTPATIENT
Start: 2024-11-25

## 2024-11-25 NOTE — TELEPHONE ENCOUNTER
----- Message from Deneen Berman sent at 11/25/2024 11:12 AM EST -----  Systolic pressure remains elevated, start amlodipine 2.5 mg daily. Continue to monitor BP twice daily for the next week       ----- Message -----  From: Haydee Law RegSched Rep  Sent: 11/25/2024  11:06 AM EST  To: EMERSON Thakkar

## 2024-12-04 ENCOUNTER — TELEPHONE (OUTPATIENT)
Dept: CARDIOLOGY | Facility: CLINIC | Age: 79
End: 2024-12-04
Payer: MEDICARE

## 2024-12-04 NOTE — TELEPHONE ENCOUNTER
----- Message from Deneen Berman sent at 12/4/2024  8:47 AM EST -----  Blood pressures are improved but still mildly elevated, would recommend going up to 5 mg amlodipine. Continue to monitor BP for one week and send in log please  ----- Message -----  From: Haydee Law RegSched Rep  Sent: 12/4/2024   8:41 AM EST  To: EMERSON Thakkar

## 2024-12-04 NOTE — TELEPHONE ENCOUNTER
Patient returned call and has scheduled to see Dr Izaguirre on 12/14/19 and is also requesting refills for the following medications:    Glipizide  hydrochlorithiazide   SW patient regarding recommendations. Voiced understanding.

## 2024-12-11 ENCOUNTER — TELEPHONE (OUTPATIENT)
Dept: CARDIOLOGY | Facility: CLINIC | Age: 79
End: 2024-12-11
Payer: MEDICARE

## 2024-12-11 NOTE — TELEPHONE ENCOUNTER
----- Message from Deneen Berman sent at 12/11/2024 11:38 AM EST -----        ----- Message -----  From: Haydee Law RegSched Rep  Sent: 12/11/2024  11:36 AM EST  To: EMERSON Thakkar

## 2025-01-17 RX ORDER — APIXABAN 5 MG/1
5 TABLET, FILM COATED ORAL 2 TIMES DAILY
Qty: 60 TABLET | Refills: 5 | Status: SHIPPED | OUTPATIENT
Start: 2025-01-17

## 2025-02-04 ENCOUNTER — TELEPHONE (OUTPATIENT)
Dept: CARDIOLOGY | Facility: CLINIC | Age: 80
End: 2025-02-04
Payer: MEDICARE

## 2025-02-04 RX ORDER — AMLODIPINE BESYLATE 2.5 MG/1
2.5 TABLET ORAL DAILY
Qty: 90 TABLET | Refills: 3 | Status: CANCELLED | OUTPATIENT
Start: 2025-02-04

## 2025-02-04 RX ORDER — AMLODIPINE BESYLATE 5 MG/1
5 TABLET ORAL DAILY
Qty: 90 TABLET | Refills: 3 | Status: SHIPPED | OUTPATIENT
Start: 2025-02-04

## 2025-03-04 ENCOUNTER — TELEPHONE (OUTPATIENT)
Dept: CARDIOLOGY | Facility: CLINIC | Age: 80
End: 2025-03-04
Payer: MEDICARE

## 2025-03-04 RX ORDER — HYDRALAZINE HYDROCHLORIDE 25 MG/1
25 TABLET, FILM COATED ORAL 2 TIMES DAILY
Qty: 90 TABLET | Refills: 7 | Status: SHIPPED | OUTPATIENT
Start: 2025-03-04

## 2025-03-04 NOTE — TELEPHONE ENCOUNTER
Caller: Walker Ordoñez    Relationship: Self    Best call back number: [phone]    Which medication are you concerned about: AMLODIPINE, 5 MG, DAILY    Who prescribed you this medication: DR. PORTILLO    When did you start taking this medication: AROUND 2.11.25    What are your concerns: PT'S LEGS AND FEET ARE TURNING SWOLLEN AND PUFFY    How long have you had these concerns: SINCE STARTING TO TAKE THE MED

## 2025-04-01 RX ORDER — PREDNISONE 1 MG/1
1 TABLET ORAL 3 TIMES DAILY
Qty: 270 TABLET | Refills: 3 | Status: SHIPPED | OUTPATIENT
Start: 2025-04-01

## 2025-04-17 ENCOUNTER — OFFICE VISIT (OUTPATIENT)
Dept: ORTHOPEDIC SURGERY | Facility: CLINIC | Age: 80
End: 2025-04-17
Payer: MEDICARE

## 2025-04-17 VITALS
WEIGHT: 194 LBS | BODY MASS INDEX: 23.62 KG/M2 | OXYGEN SATURATION: 97 % | HEIGHT: 76 IN | DIASTOLIC BLOOD PRESSURE: 75 MMHG | HEART RATE: 56 BPM | SYSTOLIC BLOOD PRESSURE: 153 MMHG

## 2025-04-17 DIAGNOSIS — M75.42 IMPINGEMENT SYNDROME OF LEFT SHOULDER: ICD-10-CM

## 2025-04-17 DIAGNOSIS — M25.512 LEFT SHOULDER PAIN, UNSPECIFIED CHRONICITY: Primary | ICD-10-CM

## 2025-04-17 NOTE — PROGRESS NOTES
"Chief Complaint  Initial Evaluation of the Left Shoulder     Subjective      Walker Ordoñez presents to [facility] for initial evaluation of the left shoulder.  He has had pain in the left shoulder just recently.  He had no injury or fall.  He just started having pain.  He had an arthroscopy years ago after an injury.  He has had no pain up until 2 weeks ago.  He has full ROM but pain at end ROM.      Allergies   Allergen Reactions    Codeine Nausea Only and GI Intolerance     Sick at stomach        Social History     Socioeconomic History    Marital status:    Tobacco Use    Smoking status: Former     Current packs/day: 0.00     Average packs/day: 1 pack/day for 7.0 years (7.0 ttl pk-yrs)     Types: Cigarettes     Start date: 1965     Quit date: 1972     Years since quittin.3     Passive exposure: Never    Smokeless tobacco: Never    Tobacco comments:     Quit 46 years ago   Vaping Use    Vaping status: Never Used   Substance and Sexual Activity    Alcohol use: Not Currently    Drug use: Never    Sexual activity: Defer        I reviewed the patient's chief complaint, history of present illness, review of systems, past medical history, surgical history, family history, social history, medications, and allergy list.     Review of Systems     Constitutional: Denies fevers, chills, weight loss  Cardiovascular: Denies chest pain, shortness of breath  Skin: Denies rashes, acute skin changes  Neurologic: Denies headache, loss of consciousness        Vital Signs:   /75   Pulse 56   Ht 193 cm (76\")   Wt 88 kg (194 lb)   SpO2 97%   BMI 23.61 kg/m²          Physical Exam  General: Alert. No acute distress    Ortho Exam        LEFT SHOULDER Forward flexion 180. Abduction 95. External rotation 40. Internal rotation lumbar spine. Positive Cross body adduction. Supraspinatus strength 4/5. Infraspinatus Strength 4+/5. Infrared subscap 4+/5. Positive Hazel. Positive " Neer. Negative Apprehension. Negative Lift off. (Negative Obriens. Sensation intact to light touch, median, radial, ulnar nerve. Positive AIN, PIN, ulnar nerve motor. Positive pulses. Positive Impingement signs. Good strength in triceps, biceps, deltoid, wrist extensors and wrist flexors. Tender to palpation to the lateral aspect of the shoulder and down the arm.  Pain with empty can testing.         Procedures      Imaging Results (Most Recent)       Procedure Component Value Units Date/Time    XR Scapula Left [[id number]] Resulted: 04/17/25 1055     Updated: 04/17/25 1057             Result Review :     X-Ray Report:  Left scapula X-Ray  Indication: Evaluation of the left scapula  AP/Lateral view(s)  Findings: Moderate glenohumeral and AC joint arthritis with osteophyte formation noted.    Prior studies available for comparison: no             Assessment and Plan     Diagnoses and all orders for this visit:    1. Left shoulder pain, unspecified chronicity (Primary)  -     XR Scapula Left    2. Impingement syndrome of left shoulder        Discussed the treatment plan with the patient. I reviewed the X-rays that were obtained today with the patient.     HEP exercises given.  He is on Eliquis due to blood clots.      Prescribed physical therapy.  Discussed injection, he denied at this time.      Call or return if worsening symptoms.    Follow Up     PRN      Patient was given instructions and counseling regarding his condition or for health maintenance advice. Please see specific information pulled into the AVS if appropriate.     Scribed for Dayan Rodirguez MD by Sadie Hartmann MA.  04/17/25   11:03 EDT    I have personally performed the services described in this document as scribed by the above individual and it is both accurate and complete. Dayan Rodriguez MD 04/17/25

## 2025-04-28 ENCOUNTER — OFFICE VISIT (OUTPATIENT)
Dept: FAMILY MEDICINE CLINIC | Facility: CLINIC | Age: 80
End: 2025-04-28
Payer: MEDICARE

## 2025-04-28 VITALS
SYSTOLIC BLOOD PRESSURE: 161 MMHG | TEMPERATURE: 97.5 F | HEART RATE: 57 BPM | DIASTOLIC BLOOD PRESSURE: 74 MMHG | HEIGHT: 76 IN | OXYGEN SATURATION: 98 % | BODY MASS INDEX: 23.92 KG/M2 | WEIGHT: 196.4 LBS

## 2025-04-28 DIAGNOSIS — I26.93 SINGLE SUBSEGMENTAL PULMONARY EMBOLISM WITHOUT ACUTE COR PULMONALE: ICD-10-CM

## 2025-04-28 DIAGNOSIS — I35.0 AORTIC STENOSIS, MILD: ICD-10-CM

## 2025-04-28 DIAGNOSIS — I10 HYPERTENSION, ESSENTIAL: Primary | ICD-10-CM

## 2025-04-28 LAB
ALBUMIN SERPL-MCNC: 4.3 G/DL (ref 3.5–5.2)
ALBUMIN/GLOB SERPL: 1.5 G/DL
ALP SERPL-CCNC: 74 U/L (ref 39–117)
ALT SERPL W P-5'-P-CCNC: 14 U/L (ref 1–41)
ANION GAP SERPL CALCULATED.3IONS-SCNC: 10.1 MMOL/L (ref 5–15)
AST SERPL-CCNC: 26 U/L (ref 1–40)
BILIRUB SERPL-MCNC: 0.4 MG/DL (ref 0–1.2)
BUN SERPL-MCNC: 14 MG/DL (ref 8–23)
BUN/CREAT SERPL: 15.2 (ref 7–25)
CALCIUM SPEC-SCNC: 10.1 MG/DL (ref 8.6–10.5)
CHLORIDE SERPL-SCNC: 104 MMOL/L (ref 98–107)
CHOLEST SERPL-MCNC: 138 MG/DL (ref 0–200)
CO2 SERPL-SCNC: 27.9 MMOL/L (ref 22–29)
CREAT SERPL-MCNC: 0.92 MG/DL (ref 0.76–1.27)
DEPRECATED RDW RBC AUTO: 42.9 FL (ref 37–54)
EGFRCR SERPLBLD CKD-EPI 2021: 83.6 ML/MIN/1.73
ERYTHROCYTE [DISTWIDTH] IN BLOOD BY AUTOMATED COUNT: 12 % (ref 12.3–15.4)
GLOBULIN UR ELPH-MCNC: 2.9 GM/DL
GLUCOSE SERPL-MCNC: 91 MG/DL (ref 65–99)
HCT VFR BLD AUTO: 44.1 % (ref 37.5–51)
HDLC SERPL-MCNC: 26 MG/DL (ref 40–60)
HGB BLD-MCNC: 15.1 G/DL (ref 13–17.7)
LDLC SERPL CALC-MCNC: 67 MG/DL (ref 0–100)
LDLC/HDLC SERPL: 2.15 {RATIO}
MCH RBC QN AUTO: 33.4 PG (ref 26.6–33)
MCHC RBC AUTO-ENTMCNC: 34.2 G/DL (ref 31.5–35.7)
MCV RBC AUTO: 97.6 FL (ref 79–97)
PLATELET # BLD AUTO: 320 10*3/MM3 (ref 140–450)
PMV BLD AUTO: 10 FL (ref 6–12)
POTASSIUM SERPL-SCNC: 4.8 MMOL/L (ref 3.5–5.2)
PROT SERPL-MCNC: 7.2 G/DL (ref 6–8.5)
RBC # BLD AUTO: 4.52 10*6/MM3 (ref 4.14–5.8)
SODIUM SERPL-SCNC: 142 MMOL/L (ref 136–145)
TRIGL SERPL-MCNC: 281 MG/DL (ref 0–150)
VLDLC SERPL-MCNC: 45 MG/DL (ref 5–40)
WBC NRBC COR # BLD AUTO: 9.52 10*3/MM3 (ref 3.4–10.8)

## 2025-04-28 PROCEDURE — 3078F DIAST BP <80 MM HG: CPT | Performed by: FAMILY MEDICINE

## 2025-04-28 PROCEDURE — 85027 COMPLETE CBC AUTOMATED: CPT | Performed by: FAMILY MEDICINE

## 2025-04-28 PROCEDURE — 80053 COMPREHEN METABOLIC PANEL: CPT | Performed by: FAMILY MEDICINE

## 2025-04-28 PROCEDURE — 3077F SYST BP >= 140 MM HG: CPT | Performed by: FAMILY MEDICINE

## 2025-04-28 PROCEDURE — 1126F AMNT PAIN NOTED NONE PRSNT: CPT | Performed by: FAMILY MEDICINE

## 2025-04-28 PROCEDURE — 99214 OFFICE O/P EST MOD 30 MIN: CPT | Performed by: FAMILY MEDICINE

## 2025-04-28 PROCEDURE — 80061 LIPID PANEL: CPT | Performed by: FAMILY MEDICINE

## 2025-04-28 NOTE — ASSESSMENT & PLAN NOTE
His home blood pressure readings are much better than here today.  Will continue his current meds.

## 2025-04-28 NOTE — ASSESSMENT & PLAN NOTE
His low last echocardiogram was about a year and a half ago.  It did show aortic stenosis.  He does have a follow-up appoint with cardiology.  He will discuss repeating it at that time.

## 2025-04-28 NOTE — PROGRESS NOTES
Chief Complaint   Patient presents with    Hypertension     The patient is coming in for a 6 month follow up. The patient states at home his systolic is on the 130's. No concerns.         Subjective     Walker Ordoñez  has a past medical history of Anxiety, Arthritis, Blood clotting disorder (04/27/2017), Broken bones, Coagulation disorder, Deep vein thrombosis, Depression, Erectile dysfunction, Fracture of hip, Fracture, fibula, Headache, Hemorrhoid, Nephrolithiasis, PMR (polymyalgia rheumatica), Pulmonary embolism, Rotator cuff syndrome, and Tear of meniscus of knee.    Hypertension  This is a chronic problem. The current episode started more than 1 year ago. The problem is unchanged. The problem is controlled. Pertinent negatives include no chest pain, headaches, neck pain or shortness of breath. Risk factors for coronary artery disease include male gender. Current antihypertension treatment includes direct vasodilators, beta blockers and ACE inhibitors. The current treatment provides significant improvement. There are no compliance problems.    Additional comments: At home his blood pressures consistently in the 130s over 70s.    Symptoms include: joint pain.   Pertinent negative symptoms include no abdominal pain, no anorexia, no change in stool, no chest pain, no chills, no congestion, no cough, no diaphoresis, no fatigue, no fever, no headaches, no joint swelling, no myalgias, no nausea, no neck pain, no numbness, no rash, no sore throat, no swollen glands, no dysuria, no vertigo, no visual change, no vomiting and no weakness.       PHQ-2 Depression Screening  Little interest or pleasure in doing things?     Feeling down, depressed, or hopeless?     PHQ-2 Total Score     PHQ-9 Depression Screening  Little interest or pleasure in doing things?     Feeling down, depressed, or hopeless?     Trouble falling or staying asleep, or sleeping too much?     Feeling tired or having little energy?     Poor appetite or  overeating?     Feeling bad about yourself - or that you are a failure or have let yourself or your family down?     Trouble concentrating on things, such as reading the newspaper or watching television?     Moving or speaking so slowly that other people could have noticed? Or the opposite - being so fidgety or restless that you have been moving around a lot more than usual?     Thoughts that you would be better off dead, or of hurting yourself in some way?     PHQ-9 Total Score     If you checked off any problems, how difficult have these problems made it for you to do your work, take care of things at home, or get along with other people?       Allergies   Allergen Reactions    Codeine Nausea Only and GI Intolerance     Sick at stomach       Prior to Admission medications    Medication Sig Start Date End Date Taking? Authorizing Provider   calcium carbonate (OS-ROE) 600 MG tablet Take 1 tablet by mouth Daily. LAST DOSE 2/12/24   Yes Eva Garza MD   Diclofenac Sodium (VOLTAREN) 1 % gel gel Apply 4 g topically to the appropriate area as directed 4 (Four) Times a Day As Needed (AS NEEDED). 10/31/24  Yes Dayan Rodriguez MD   Eliquis 5 MG tablet tablet Take 1 tablet by mouth 2 (Two) Times a Day. 1/17/25  Yes Tanvir Garcia, DO   hydrALAZINE (APRESOLINE) 25 MG tablet Take 1 tablet by mouth 2 (Two) Times a Day. 3/4/25  Yes Deneen Berman APRN   lisinopril (PRINIVIL,ZESTRIL) 20 MG tablet TAKE 1 TABLET BY MOUTH DAILY 7/10/24  Yes Tanvir Garcia DO   metoprolol succinate XL (TOPROL-XL) 50 MG 24 hr tablet TAKE 1 TABLET BY MOUTH DAILY 11/11/24  Yes Tanvir Garcia, DO   Multiple Vitamin (MULTI-VITAMIN DAILY PO) Take 1 tablet by mouth Daily. LAST DOSE 2/12/24   Yes Eva Garza MD   predniSONE (DELTASONE) 1 MG tablet Take 1 tablet by mouth 3 times a day. 4/1/25  Yes Tanvir Garcia DO   sertraline (ZOLOFT) 25 MG tablet TAKE 1 TABLET BY MOUTH DAILY 11/5/24  Yes  Tanvir Garcia DO        Patient Active Problem List   Diagnosis    Long term current use of systemic steroids    Hypertension, essential    PMR (polymyalgia rheumatica)    Depression    History of pulmonary embolism    Pneumonia    Hospital discharge follow-up    Anxiety    Arthritis    Broken bones    Coagulation disorder    Hemorrhoid    Kidney stone    Nephrolithiasis    Cough    Primary osteoarthritis of left knee    Fall (on) (from) other stairs and steps, initial encounter    Sprain of tibiofibular ligament of left ankle    Skin tear of right elbow without complication    Asymptomatic microscopic hematuria    Gross hematuria    Aortic stenosis, mild    Pulmonary embolism        Past Surgical History:   Procedure Laterality Date    BACK SURGERY      Lumbar, NO FUSION    COLONOSCOPY  2019    ESOPHAGUS SURGERY      stretching    FEMUR FRACTURE SURGERY Left     ORIF    HERNIA REPAIR Right     IHR    HIP FRACTURE SURGERY Left 2019    JOINT REPLACEMENT      KIDNEY STONE SURGERY      KNEE SURGERY Right     ARTHROSCOPY/MENISCUS TEAR    NECK SURGERY      2 DISCS REMOVED ANTERIOR. FUSION,  FULL ROM    SHOULDER SURGERY Left     Left Tear, ARTHROSCOPY    URETEROSCOPY LASER LITHOTRIPSY WITH STENT INSERTION Left 2024    Procedure: cystoscopy with left ureteroscopy with laser and left ureteral stent placement.  Right retrograde pyelogram;  Surgeon: Marcus Coello MD;  Location: [facility];  Service: Urology;  Laterality: Left;       Social History     Socioeconomic History    Marital status:    Tobacco Use    Smoking status: Former     Current packs/day: 0.00     Average packs/day: 1 pack/day for 10.0 years (10.0 ttl pk-yrs)     Types: Cigarettes     Start date: 1965     Quit date: 1972     Years since quittin.3     Passive exposure: Never    Smokeless tobacco: Never    Tobacco comments:     Quit 46 years ago   Vaping Use    Vaping status: Never Used   Substance and Sexual  "Activity    Alcohol use: Not Currently    Drug use: Never    Sexual activity: Not Currently     Partners: Female       Family History   Problem Relation Age of Onset    Arthritis Mother     Leukemia Mother     Heart disease Brother     Hypertension Father     Malig Hyperthermia Neg Hx        Family history, surgical history, past medical history, Allergies and meds reviewed with patient today and updated in [facility] EMR.     ROS:  Review of Systems   Constitutional:  Negative for chills, diaphoresis, fatigue and fever.   HENT:  Negative for congestion, sore throat and swollen glands.    Respiratory:  Negative for cough, chest tightness, shortness of breath and wheezing.    Cardiovascular:  Negative for chest pain.   Gastrointestinal:  Negative for abdominal pain, anorexia, nausea and vomiting.   Genitourinary:  Negative for dysuria.   Musculoskeletal:  Positive for joint pain. Negative for myalgias and neck pain.   Skin:  Negative for rash.   Neurological:  Negative for vertigo, weakness, numbness and headache.   Psychiatric/Behavioral:  Negative for depressed mood. The patient is not nervous/anxious.        OBJECTIVE:  Vitals:    04/28/25 1358   BP: 161/74   BP Location: Left arm   Patient Position: Sitting   Cuff Size: Large Adult   Pulse: 57   Temp: 97.5 °F (36.4 °C)   TempSrc: Temporal   SpO2: 98%   Weight: 89.1 kg (196 lb 6.4 oz)   Height: 193 cm (76\")     No results found.   Body mass index is 23.91 kg/m².  No LMP for male patient.    The ASCVD Risk score (Ludlow DK, et al., 2019) failed to calculate for the following reasons:    The 2019 ASCVD risk score is only valid for ages 40 to 79     Physical Exam  Vitals and nursing note reviewed.   Constitutional:       General: He is not in acute distress.     Appearance: Normal appearance. He is normal weight.   HENT:      Head: Normocephalic.      Right Ear: Tympanic membrane, ear canal and external ear normal.      Left Ear: Tympanic membrane, ear canal and external " ear normal.      Nose: Nose normal.      Mouth/Throat:      Mouth: Mucous membranes are moist.      Pharynx: Oropharynx is clear.   Eyes:      General: No scleral icterus.     Conjunctiva/sclera: Conjunctivae normal.      Pupils: Pupils are equal, round, and reactive to light.   Cardiovascular:      Rate and Rhythm: Normal rate and regular rhythm.      Pulses: Normal pulses.      Heart sounds: Murmur heard.      Systolic murmur is present with a grade of 3/6.   Pulmonary:      Effort: Pulmonary effort is normal.      Breath sounds: Normal breath sounds. No wheezing, rhonchi or rales.   Musculoskeletal:      Cervical back: Neck supple. No rigidity or tenderness.   Lymphadenopathy:      Cervical: No cervical adenopathy.   Skin:     General: Skin is warm and dry.      Coloration: Skin is not jaundiced.      Findings: No rash.   Neurological:      General: No focal deficit present.      Mental Status: He is alert and oriented to person, place, and time.   Psychiatric:         Mood and Affect: Mood normal.         Thought Content: Thought content normal.         Judgment: Judgment normal.         Procedures    No visits with results within 30 Day(s) from this visit.   Latest known visit with results is:   Office Visit on 10/28/2024   Component Date Value Ref Range Status    Glucose 10/28/2024 113 (H)  65 - 99 mg/dL Final    BUN 10/28/2024 13  8 - 23 mg/dL Final    Creatinine 10/28/2024 0.99  0.76 - 1.27 mg/dL Final    Sodium 10/28/2024 142  136 - 145 mmol/L Final    Potassium 10/28/2024 4.8  3.5 - 5.2 mmol/L Final    Chloride 10/28/2024 105  98 - 107 mmol/L Final    CO2 10/28/2024 28.1  22.0 - 29.0 mmol/L Final    Calcium 10/28/2024 9.9  8.6 - 10.5 mg/dL Final    Total Protein 10/28/2024 7.0  6.0 - 8.5 g/dL Final    Albumin 10/28/2024 3.9  3.5 - 5.2 g/dL Final    ALT (SGPT) 10/28/2024 12  1 - 41 U/L Final    AST (SGOT) 10/28/2024 25  1 - 40 U/L Final    Alkaline Phosphatase 10/28/2024 71  39 - 117 U/L Final    Total  Bilirubin 10/28/2024 0.4  0.0 - 1.2 mg/dL Final    Globulin 10/28/2024 3.1  gm/dL Final    A/G Ratio 10/28/2024 1.3  g/dL Final    BUN/Creatinine Ratio 10/28/2024 13.1  7.0 - 25.0 Final    Anion Gap 10/28/2024 8.9  5.0 - 15.0 mmol/L Final    eGFR 10/28/2024 77.0  >60.0 mL/min/1.73 Final    Total Cholesterol 10/28/2024 164  0 - 200 mg/dL Final    Triglycerides 10/28/2024 368 (H)  0 - 150 mg/dL Final    HDL Cholesterol 10/28/2024 28 (L)  40 - 60 mg/dL Final    LDL Cholesterol  10/28/2024 77  0 - 100 mg/dL Final    VLDL Cholesterol 10/28/2024 59 (H)  5 - 40 mg/dL Final    LDL/HDL Ratio 10/28/2024 2.23   Final    WBC 10/28/2024 9.14  3.40 - 10.80 10*3/mm3 Final    RBC 10/28/2024 4.43  4.14 - 5.80 10*6/mm3 Final    Hemoglobin 10/28/2024 14.7  13.0 - 17.7 g/dL Final    Hematocrit 10/28/2024 43.6  37.5 - 51.0 % Final    MCV 10/28/2024 98.4 (H)  79.0 - 97.0 fL Final    MCH 10/28/2024 33.2 (H)  26.6 - 33.0 pg Final    MCHC 10/28/2024 33.7  31.5 - 35.7 g/dL Final    RDW 10/28/2024 13.0  12.3 - 15.4 % Final    RDW-SD 10/28/2024 46.6  37.0 - 54.0 fl Final    MPV 10/28/2024 9.7  6.0 - 12.0 fL Final    Platelets 10/28/2024 272  140 - 450 10*3/mm3 Final       ASSESSMENT/ PLAN:    Diagnoses and all orders for this visit:    1. Hypertension, essential (Primary)  Assessment & Plan:  His home blood pressure readings are much better than here today.  Will continue his current meds.    Orders:  -     CBC (No Diff)  -     Comprehensive Metabolic Panel  -     Lipid Panel    2. Aortic stenosis, mild  Assessment & Plan:  His low last echocardiogram was about a year and a half ago.  It did show aortic stenosis.  He does have a follow-up appoint with cardiology.  He will discuss repeating it at that time.      3. Single subsegmental pulmonary embolism without acute cor pulmonale  Assessment & Plan:  He can continues on his Eliquis 5 mg twice a day on a regular basis.          BMI is within normal parameters. No other follow-up for BMI  required.      Orders Placed Today:     No orders of the defined types were placed in this encounter.       Management Plan:     An After Visit Summary was printed and given to the patient at discharge.    Follow-up: Return in about 6 months (around 10/28/2025) for Recheck.    Tanvir Garcia,  4/28/2025 14:21 EDT  This note was electronically signed.

## 2025-05-05 RX ORDER — SERTRALINE HYDROCHLORIDE 25 MG/1
25 TABLET, FILM COATED ORAL DAILY
Qty: 90 TABLET | Refills: 1 | Status: SHIPPED | OUTPATIENT
Start: 2025-05-05

## 2025-05-06 ENCOUNTER — TREATMENT (OUTPATIENT)
Dept: PHYSICAL THERAPY | Facility: CLINIC | Age: 80
End: 2025-05-06
Payer: MEDICARE

## 2025-05-06 DIAGNOSIS — G89.29 CHRONIC LEFT SHOULDER PAIN: Primary | ICD-10-CM

## 2025-05-06 DIAGNOSIS — R29.898 WEAKNESS OF LEFT UPPER EXTREMITY: ICD-10-CM

## 2025-05-06 DIAGNOSIS — M25.612 DECREASED ROM OF LEFT SHOULDER: ICD-10-CM

## 2025-05-06 DIAGNOSIS — M25.512 CHRONIC LEFT SHOULDER PAIN: Primary | ICD-10-CM

## 2025-05-06 NOTE — PROGRESS NOTES
Physical Therapy Initial Evaluation and Plan of Care     [address] [place] [id number]    Patient: Walker Ordoñez   : 1945  Diagnosis/ICD-10 Code:  Chronic left shoulder pain [M25.512, G89.29]  Referring practitioner: Dayan Rodriguez MD  Date of Initial Visit: 2025  Today's Date: 2025  Patient seen for 1 sessions           Subjective Questionnaire: QuickDASH: 25      Subjective  : Pt presents to physical therapy with left shoulder pain. Woke up with left shoulder pain, didn't injure it that he knows of. Reaching out is painful or trying to lift anything overhead. He just feels tightness as well. Hasn't tried much yet.    Pt occupation/Living environment: lives alone    Patient Goals: Reduced shoulder pain    Current Pain 4/10  Best Pain: 4/10  Worst Pain: 6/10          Past Medical Hx:    Past Medical History:   Diagnosis Date    Anxiety     Arthritis     Blood clotting disorder 2017    6 yrs ago pt had blood clot TJ    Broken bones     Coagulation disorder     ELIQUIS    Deep vein thrombosis     NO CURRENT ISSUES    Depression     Erectile dysfunction     Fracture of hip     Fracture, fibula     Headache     Hemorrhoid     Nephrolithiasis     PMR (polymyalgia rheumatica)     Pulmonary embolism     RESOLVED    Rotator cuff syndrome     Tear of meniscus of knee       Past Surgical History:   Procedure Laterality Date    BACK SURGERY      Lumbar, NO FUSION    COLONOSCOPY  2019    ESOPHAGUS SURGERY      stretching    FEMUR FRACTURE SURGERY Left     ORIF    HERNIA REPAIR Right     IHR    HIP FRACTURE SURGERY Left 2019    JOINT REPLACEMENT      KIDNEY STONE SURGERY      KNEE SURGERY Right     ARTHROSCOPY/MENISCUS TEAR    NECK SURGERY      2 DISCS REMOVED ANTERIOR. FUSION,  FULL ROM    SHOULDER SURGERY Left     Left Tear, ARTHROSCOPY    URETEROSCOPY LASER LITHOTRIPSY WITH STENT INSERTION Left 2024    Procedure: cystoscopy with left ureteroscopy with laser and left ureteral  stent placement.  Right retrograde pyelogram;  Surgeon: Marcus Coello MD;  Location: [facility] OR;  Service: Urology;  Laterality: Left;             Objective          Postural Observations    Additional Postural Observation Details  Rounded shoulders    Active Range of Motion   Left Shoulder   Flexion: 120 degrees   Abduction: 120 degrees   External rotation BTH: C2   Internal rotation BTB: L5     Passive Range of Motion   Left Shoulder   Flexion: 160 degrees   Abduction: 140 degrees   External rotation 45°: 70 degrees   Internal rotation 45°: 60 degrees     Joint Play   Left Shoulder  Hypomobile in the posterior capsule and inferior capsule.    Strength/Myotome Testing     Left Shoulder     Planes of Motion   Flexion: 4   Extension: 4+   Abduction: 4-   External rotation at 0°: 4   Internal rotation at 0°: 4+     Tests     Left Shoulder   Positive Hawkin's and Neer's.       See Exercise, Manual, and Modality Logs for complete treatment.     Assessment & Plan       Assessment  Impairments: abnormal muscle firing, abnormal or restricted ROM, activity intolerance, impaired physical strength and pain with function   Functional limitations: carrying objects, lifting, sleeping, reaching behind back and reaching overhead   Assessment details: The patient presents to physical therapy with complaints of left shoulder pain with s/s of impingement and weakness in the rotator cuff along with limited capsular mobility. The patient presents with associated shoulder weakness, stiffness, and functional deficits (QuickDASH). The patient would benefit from skilled PT intervention to address the above mentioned functional limitations.     Prognosis: good    Goals  Plan Goals: SHOULDER  PROBLEMS:     1. The patient has limited ROM of the left shoulder.    LTG 1: 12 weeks:  The patient will demonstrate 150 degrees of shoulder flexion, 140 degrees of shoulder abduction, shoulder external rotation to C7, and shoulder internal  rotation to T10 to allow the patient to reach into upper kitchen cabinets and manipulate clothing behind the back with greater ease.    STATUS:  New   STG1b:  6 weeks:  The patient will be independent with home exercises.     STATUS:  New       2. The patient has limited strength of the left shoulder.   LTG 2: 12 weeks:  The patient will demonstrate 5 /5 strength for shoulder flexion, abduction, external rotation, and internal rotation in order to demonstrate improved shoulder stability.    STATUS:  New   STG 2a: 6 weeks:  The patient will demonstrate 4+ /5 strength for shoulder flexion, abduction, external rotation, and internal rotation.    STATUS:  New           3. The patient complains of pain to the left shoulder.   LTG 3: 12 weeks:  The patient will report a pain rating of 3 /10 or better at its worst in order to improve sleep quality and tolerance to performance of activities of daily living.    STATUS:  New   STG 3a: 6 weeks:  The patient will report a pain rating of 5 /10 or better at its worst.     STATUS:  New      4. Carrying, Moving, and Handling Objects Functional Limitation     LTG 4: 12 weeks:  The patient will demonstrate 1-19% limitation by achieving a score of 12-19 on the QuickDASH.    STATUS:  New             Plan  Therapy options: will be seen for skilled therapy services  Planned modality interventions: TENS, cryotherapy, thermotherapy (hydrocollator packs) and dry needling  Planned therapy interventions: functional ROM exercises, home exercise program, joint mobilization, manual therapy, soft tissue mobilization, stretching and strengthening  Frequency: 3x week  Duration in weeks: 12  Treatment plan discussed with: patient        Visit Diagnoses:    ICD-10-CM ICD-9-CM   1. Chronic left shoulder pain  M25.512 719.41    G89.29 338.29   2. Decreased ROM of left shoulder  M25.612 719.51   3. Weakness of left upper extremity  R29.898 729.89       History # of Personal Factors and/or Comorbidities:  MODERATE (1-2)  Examination of Body System(s): # of elements: MODERATE (3)  Clinical Presentation: STABLE   Clinical Decision Making: LOW       Timed:         Manual Therapy:    10     mins  12161;     Therapeutic Exercise:    16     mins  73090;     Neuromuscular Amor:    0    mins  86176;    Therapeutic Activity:     0     mins  61039;     Gait Trainin     mins  88278;     Ultrasound:     0     mins  89724;    Ionto                               0    mins   45259  Self Care                       12     mins   61820        Un-Timed:  Electrical Stimulation:    0     mins  73893 ( );  Dry Needling     0     mins self-pay  Canalith Repos    0     mins 83354  Traction     0     mins 35790      Timed Treatment:   38   mins   Total Treatment:     56   mins    PT SIGNATURE: Campbell Lundy PT     Electronically signed 2025    [place] License: PT - [id number]     Initial Certification  Certification Period: 2025 thru 8/3/2025  I certify that the therapy services are furnished while this patient is under my care.  The services outlined above are required by this patient, and will be reviewed every 90 days.     PHYSICIAN: Dayan Rodriguez MD   NPI: [id number]                                        DATE:     Please sign and return via fax to [phone]. Thank you, [facility] Physical Therapy.

## 2025-05-08 NOTE — PROGRESS NOTES
[facility]  Cardiology progress Note    Patient Name: Walker Ordoñez  : 1945    CHIEF COMPLAINT  Hypertension        Subjective   Subjective     HISTORY OF PRESENT ILLNESS    Walker Ordoñez is a 79 y.o. male with history of hypertension.  No chest pain.    REVIEW OF SYSTEMS    Constitutional:    No fever, no weight loss  Skin:     No rash  Otolaryngeal:    No difficulty swallowing  Cardiovascular: See HPI.  Pulmonary:    No cough, no sputum production    Personal History     Social History:    reports that he quit smoking about 53 years ago. His smoking use included cigarettes. He started smoking about 60 years ago. He has a 10 pack-year smoking history. He has never been exposed to tobacco smoke. He has never used smokeless tobacco. He reports that he does not currently use alcohol. He reports that he does not use drugs.    Home Medications:  Current Outpatient Medications on File Prior to Visit   Medication Sig    amLODIPine (NORVASC) 5 MG tablet     calcium carbonate (OS-ROE) 600 MG tablet Take 1 tablet by mouth Daily. LAST DOSE 24    Diclofenac Sodium (VOLTAREN) 1 % gel gel Apply 4 g topically to the appropriate area as directed 4 (Four) Times a Day As Needed (AS NEEDED).    Eliquis 5 MG tablet tablet Take 1 tablet by mouth 2 (Two) Times a Day.    hydrALAZINE (APRESOLINE) 25 MG tablet Take 1 tablet by mouth 2 (Two) Times a Day.    lisinopril (PRINIVIL,ZESTRIL) 20 MG tablet TAKE 1 TABLET BY MOUTH DAILY    metoprolol succinate XL (TOPROL-XL) 50 MG 24 hr tablet TAKE 1 TABLET BY MOUTH DAILY    Multiple Vitamin (MULTI-VITAMIN DAILY PO) Take 1 tablet by mouth Daily. LAST DOSE 24    predniSONE (DELTASONE) 1 MG tablet Take 1 tablet by mouth 3 times a day.    sertraline (ZOLOFT) 25 MG tablet TAKE 1 TABLET BY MOUTH DAILY     No current facility-administered medications on file prior to visit.       Past Medical History:   Diagnosis Date    Anxiety     Arthritis     Blood clotting disorder  04/27/2017    6 yrs ago pt had blood clot TJ    Broken bones     Coagulation disorder     ELIQUIS    Deep vein thrombosis     NO CURRENT ISSUES    Depression     Erectile dysfunction     Fracture of hip     Fracture, fibula     Headache     Hemorrhoid     Nephrolithiasis     PMR (polymyalgia rheumatica)     Pulmonary embolism     RESOLVED    Rotator cuff syndrome     Tear of meniscus of knee        Allergies:  Allergies   Allergen Reactions    Codeine Nausea Only and GI Intolerance     Sick at stomach       Objective    Objective       Vitals:   Heart Rate:  [62] 62  BP: (176)/(85) 176/85  Body mass index is 23.26 kg/m².     PHYSICAL EXAM:    General Appearance:   well developed  well nourished  HENT:   oropharynx moist  lips not cyanotic  Neck:  thyroid not enlarged  supple  Respiratory:  no respiratory distress  normal breath sounds  no rales  Cardiovascular:  no jugular venous distention  regular rhythm  apical impulse normal  S1 normal, S2 normal  no S3, no S4   SM GR 3/6  no rub, no thrill  carotid pulses normal; no bruit  pedal pulses normal  lower extremity edema: none    Skin:   warm, dry  Psychiatric:  judgement and insight appropriate  normal mood and affect        Result Review:  I have personally reviewed the available results from  [x]  Laboratory  [x]  EKG  [x]  Cardiology  [x]  Medications  [x]  Old records  []  Other:     Procedures  Lab Results   Component Value Date    CHOL 138 04/28/2025    CHOL 164 10/28/2024    CHOL 169 04/26/2024     Lab Results   Component Value Date    TRIG 281 (H) 04/28/2025    TRIG 368 (H) 10/28/2024    TRIG 273 (H) 04/26/2024     Lab Results   Component Value Date    HDL 26 (L) 04/28/2025    HDL 28 (L) 10/28/2024    HDL 31 (L) 04/26/2024     Lab Results   Component Value Date    LDL 67 04/28/2025    LDL 77 10/28/2024    LDL 92 04/26/2024     Lab Results   Component Value Date    VLDL 45 (H) 04/28/2025    VLDL 59 (H) 10/28/2024    VLDL 46 (H) 04/26/2024     Results for  orders placed during the hospital encounter of 02/27/24    Adult Transthoracic Echo Complete W/ Cont if Necessary Per Protocol    Interpretation Summary    Left ventricular systolic function is hyperdynamic (EF > 70%). Calculated left ventricular EF = 74.8%    Mild aortic valve stenosis is present.    Estimated right ventricular systolic pressure from tricuspid regurgitation is normal (<35 mmHg).    Trace MR and trace TR.     Impression/Plan:  1.  Essential hypertension Uncontrolled: Increase lisinopril to 20 mg twice a day.  Continue amlodipine 5 mg once a day.  Continue Toprol-XL 50 mg once a day.  Blood pressure control at home.  2.  Stable palpitations: Continue Toprol-XL 50 mg once a day.  No palpitations.  3.  History of pulm embolism: Continue Eliquis 5 mg twice a day.                 Bimal Rivera MD   05/13/25   13:26 EDT

## 2025-05-12 RX ORDER — METOPROLOL SUCCINATE 50 MG/1
50 TABLET, EXTENDED RELEASE ORAL DAILY
Qty: 90 TABLET | Refills: 1 | Status: SHIPPED | OUTPATIENT
Start: 2025-05-12

## 2025-05-13 ENCOUNTER — OFFICE VISIT (OUTPATIENT)
Dept: CARDIOLOGY | Facility: CLINIC | Age: 80
End: 2025-05-13
Payer: MEDICARE

## 2025-05-13 VITALS
BODY MASS INDEX: 23.26 KG/M2 | HEART RATE: 62 BPM | OXYGEN SATURATION: 98 % | HEIGHT: 76 IN | WEIGHT: 191 LBS | SYSTOLIC BLOOD PRESSURE: 176 MMHG | DIASTOLIC BLOOD PRESSURE: 85 MMHG

## 2025-05-13 DIAGNOSIS — I10 HYPERTENSION, ESSENTIAL: Primary | ICD-10-CM

## 2025-05-13 DIAGNOSIS — R00.2 PALPITATIONS: ICD-10-CM

## 2025-05-13 PROCEDURE — 1159F MED LIST DOCD IN RCRD: CPT | Performed by: SPECIALIST

## 2025-05-13 PROCEDURE — 3079F DIAST BP 80-89 MM HG: CPT | Performed by: SPECIALIST

## 2025-05-13 PROCEDURE — 99214 OFFICE O/P EST MOD 30 MIN: CPT | Performed by: SPECIALIST

## 2025-05-13 PROCEDURE — 1160F RVW MEDS BY RX/DR IN RCRD: CPT | Performed by: SPECIALIST

## 2025-05-13 PROCEDURE — 3077F SYST BP >= 140 MM HG: CPT | Performed by: SPECIALIST

## 2025-05-13 RX ORDER — AMLODIPINE BESYLATE 5 MG/1
TABLET ORAL
COMMUNITY
Start: 2025-05-03

## 2025-05-13 RX ORDER — LISINOPRIL 20 MG/1
20 TABLET ORAL 2 TIMES DAILY
Qty: 180 TABLET | Refills: 3 | Status: SHIPPED | OUTPATIENT
Start: 2025-05-13

## 2025-05-14 ENCOUNTER — TREATMENT (OUTPATIENT)
Dept: PHYSICAL THERAPY | Facility: CLINIC | Age: 80
End: 2025-05-14
Payer: MEDICARE

## 2025-05-14 DIAGNOSIS — M25.512 CHRONIC LEFT SHOULDER PAIN: Primary | ICD-10-CM

## 2025-05-14 DIAGNOSIS — M25.612 DECREASED ROM OF LEFT SHOULDER: ICD-10-CM

## 2025-05-14 DIAGNOSIS — R29.898 WEAKNESS OF LEFT UPPER EXTREMITY: ICD-10-CM

## 2025-05-14 DIAGNOSIS — G89.29 CHRONIC LEFT SHOULDER PAIN: Primary | ICD-10-CM

## 2025-05-14 NOTE — PROGRESS NOTES
[facility]                              Physical Therapy Daily Treatment Note    Patient: Walker Ordoñez   : 1945  Diagnosis/ICD-10 Code:  Chronic left shoulder pain [M25.512, G89.29]  Referring practitioner: Dayan Rodriguez MD  Date of Initial Visit: Type: THERAPY  Noted: 2025  Today's Date: 2025  Patient seen for 2 sessions           Subjective   Walker Ordoñez reports: his shoulder feels pretty good today. States he feels like his exercises have been helping and isn't having any pain at time of therapy.        Objective   BP taken during mid session: 84/50  2nd time with manual cuff: 90/60    See Exercise, Manual, and Modality Logs for complete treatment.     Assessment/Plan  Walker progressing as evident by decreased overall left shoulder pain. Pt tolerated exercises well, with no complaints of increased pain or discomfort. Toward then end of the session patient stated that he was getting dizzy and needed to sit. PTA asked patient if he has and glucose or BP issues and if he ate breakfast this morning.  Pt states his cardiologist has recently changed his BP medication since it is usually high. Once BP taken therapy was stopped. Pt waited in the lobby until he felt safe to drive. PTA advised patient to not drive if he was still dizzy and to call someone to pick him up, patient understood. Patient will continue to benefit from skilled physical therapy services to further address pain management,  their deficits in strength, and range of motion in order to improve upon their functional mobility for any ADL concerns.      Progress per Plan of Care         Timed:  Manual Therapy:         mins  14016;  Therapeutic Exercise:    16     mins  42851;     Neuromuscular Amor:        mins  49874;    Therapeutic Activity:     15     mins  99427;     Gait Training:           mins  84701;        Untimed:  Electrical Stimulation:         mins  14743 (  );  Mechanical Traction:         mins  39836;       Timed Treatment:   31   mins   Total Treatment:     31   mins      Electronically signed:     Shanta Monk PTA  Physical Therapist Assistant  [place] License #: [id number]

## 2025-05-21 ENCOUNTER — TREATMENT (OUTPATIENT)
Dept: PHYSICAL THERAPY | Facility: CLINIC | Age: 80
End: 2025-05-21
Payer: MEDICARE

## 2025-05-21 DIAGNOSIS — G89.29 CHRONIC LEFT SHOULDER PAIN: Primary | ICD-10-CM

## 2025-05-21 DIAGNOSIS — R29.898 WEAKNESS OF LEFT UPPER EXTREMITY: ICD-10-CM

## 2025-05-21 DIAGNOSIS — M25.512 CHRONIC LEFT SHOULDER PAIN: Primary | ICD-10-CM

## 2025-05-21 DIAGNOSIS — M25.612 DECREASED ROM OF LEFT SHOULDER: ICD-10-CM

## 2025-05-21 NOTE — PROGRESS NOTES
Physical Therapy Daily Treatment Note  [address] [place] [address]    Patient: Walker Ordoñez   : 1945  Diagnosis/ICD-10 Code:  Chronic left shoulder pain [M25.512, G89.29]  Referring practitioner: Dayan Rodriguez MD  Date of Initial Visit: Type: THERAPY  Noted: 2025  Today's Date: 2025  Patient seen for 3 sessions           Subjective : Patient reports he has been feeling better, still having some pain with reaching overhead.    Objective   See Exercise, Manual, and Modality Logs for complete treatment.       Assessment/Plan : Pt tolerated today's session well. Progressed with strengthening and mobility routine. No increase in pain expect with end range flexion overhead. Pt would benefit from continued skilled therapy to address deficits. Progress per plan of care.             Timed:  Manual Therapy:    14     mins  32019;  Therapeutic Exercise:    16     mins  08838;     Neuromuscular Amor:    0    mins  01138;    Therapeutic Activity:     0     mins  43521;     Gait Trainin     mins  42321;     Ultrasound:     0     mins  51274;    Aquatic Therapy    0     mins  23667;        Timed Treatment:   30   mins   Total Treatment:     30   mins    Campbell Lundy PT  Physical Therapist    Electronically signed 2025    [place] License: PT - [id number]

## 2025-05-28 ENCOUNTER — TREATMENT (OUTPATIENT)
Dept: PHYSICAL THERAPY | Facility: CLINIC | Age: 80
End: 2025-05-28
Payer: MEDICARE

## 2025-05-28 DIAGNOSIS — R29.898 WEAKNESS OF LEFT UPPER EXTREMITY: ICD-10-CM

## 2025-05-28 DIAGNOSIS — M25.512 CHRONIC LEFT SHOULDER PAIN: Primary | ICD-10-CM

## 2025-05-28 DIAGNOSIS — M25.612 DECREASED ROM OF LEFT SHOULDER: ICD-10-CM

## 2025-05-28 DIAGNOSIS — G89.29 CHRONIC LEFT SHOULDER PAIN: Primary | ICD-10-CM

## 2025-05-28 PROCEDURE — 97140 MANUAL THERAPY 1/> REGIONS: CPT | Performed by: PHYSICAL THERAPIST

## 2025-05-28 PROCEDURE — 97110 THERAPEUTIC EXERCISES: CPT | Performed by: PHYSICAL THERAPIST

## 2025-05-28 NOTE — PROGRESS NOTES
Physical Therapy Daily Treatment Note  [address] [place] [id number]    Patient: Walker Ordoñez   : 1945  Diagnosis/ICD-10 Code:  Chronic left shoulder pain [M25.512, G89.29]  Referring practitioner: Dayan Rodriguez MD  Date of Initial Visit: Type: THERAPY  Noted: 2025  Today's Date: 2025  Patient seen for 4 sessions           Subjective : Patient reports he really feels like the shoulder is doing better, more loose and less painful.     Objective   See Exercise, Manual, and Modality Logs for complete treatment.       Assessment/Plan : Pt tolerated today's session well. Continued to work on mobility and strengthening, most difficulty with external rotation with resistance. Pt would benefit from continued skilled therapy to address deficits. Progress per plan of care.             Timed:  Manual Therapy:    14     mins  94196;  Therapeutic Exercise:    15     mins  81251;     Neuromuscular Amor:    0    mins  98228;    Therapeutic Activity:     0     mins  16928;     Gait Trainin     mins  36477;     Ultrasound:     0     mins  36794;    Aquatic Therapy    0     mins  97132;    Untimed:  Electrical Stimulation:    0     mins  99315 ( );  Dry Needling     0     mins self-pay;  Mechanical Traction    0     mins  63297  Moist Heat     0     mins  No charge  Canalith Repos              0     mins 70741    Timed Treatment:   29   mins   Total Treatment:     29   mins    Campbell Lundy PT  Physical Therapist    Electronically signed 2025    [place] License: PT - [id number]

## 2025-06-04 ENCOUNTER — TREATMENT (OUTPATIENT)
Dept: PHYSICAL THERAPY | Facility: CLINIC | Age: 80
End: 2025-06-04
Payer: MEDICARE

## 2025-06-04 DIAGNOSIS — M25.612 DECREASED ROM OF LEFT SHOULDER: ICD-10-CM

## 2025-06-04 DIAGNOSIS — M25.512 CHRONIC LEFT SHOULDER PAIN: Primary | ICD-10-CM

## 2025-06-04 DIAGNOSIS — G89.29 CHRONIC LEFT SHOULDER PAIN: Primary | ICD-10-CM

## 2025-06-04 DIAGNOSIS — R29.898 WEAKNESS OF LEFT UPPER EXTREMITY: ICD-10-CM

## 2025-06-04 PROCEDURE — 97110 THERAPEUTIC EXERCISES: CPT | Performed by: PHYSICAL THERAPIST

## 2025-06-04 PROCEDURE — 97530 THERAPEUTIC ACTIVITIES: CPT | Performed by: PHYSICAL THERAPIST

## 2025-06-04 NOTE — PROGRESS NOTES
[facility]                              Physical Therapy Daily Treatment Note    Patient: Walker Ordoñez   : 1945  Diagnosis/ICD-10 Code:  Chronic left shoulder pain [M25.512, G89.29]  Referring practitioner: Dayan Rodriguez MD  Date of Initial Visit: Type: THERAPY  Noted: 2025  Today's Date: 2025  Patient seen for 5 sessions           Subjective   Walker Ordoñez reports: no new complaints at time of therapy.       Objective     See Exercise, Manual, and Modality Logs for complete treatment.     Assessment/Plan  Walker progressing as evident by decreased overall left shoulder pain. Pt tolerated progression of strenthening exercises well, with no complaints of increased pain or discomfort. Patient will continue to benefit from skilled physical therapy services to further address  their deficits in strength, and range of motion in order to improve upon their functional mobility for any ADL concerns.      Progress per Plan of Care         Timed:  Manual Therapy:         mins  35542;  Therapeutic Exercise:    16     mins  47559;     Neuromuscular Amor:        mins  25285;    Therapeutic Activity:     12     mins  87641;     Gait Training:           mins  25204;        Untimed:  Electrical Stimulation:         mins  37642 ( );  Mechanical Traction:         mins  10168;       Timed Treatment:   28   mins   Total Treatment:     28   mins      Electronically signed:     Shanta Monk PTA  Physical Therapist Assistant  [facility] License #: [id number]   Patient notified of echo results and Dr. Abhishek Pierson recommendations. F/U appts scheduled accordingly.

## 2025-06-11 ENCOUNTER — TREATMENT (OUTPATIENT)
Dept: PHYSICAL THERAPY | Facility: CLINIC | Age: 80
End: 2025-06-11
Payer: MEDICARE

## 2025-06-11 DIAGNOSIS — M25.612 DECREASED ROM OF LEFT SHOULDER: ICD-10-CM

## 2025-06-11 DIAGNOSIS — R29.898 WEAKNESS OF LEFT UPPER EXTREMITY: ICD-10-CM

## 2025-06-11 DIAGNOSIS — G89.29 CHRONIC LEFT SHOULDER PAIN: Primary | ICD-10-CM

## 2025-06-11 DIAGNOSIS — M25.512 CHRONIC LEFT SHOULDER PAIN: Primary | ICD-10-CM

## 2025-06-11 PROCEDURE — 97530 THERAPEUTIC ACTIVITIES: CPT | Performed by: PHYSICAL THERAPIST

## 2025-06-11 PROCEDURE — 97110 THERAPEUTIC EXERCISES: CPT | Performed by: PHYSICAL THERAPIST

## 2025-06-11 PROCEDURE — 97164 PT RE-EVAL EST PLAN CARE: CPT | Performed by: PHYSICAL THERAPIST

## 2025-06-11 NOTE — PROGRESS NOTES
Progress Assessment  [address] [place] [id number]        Patient: Walker Ordoñez   : 1945  Diagnosis/ICD-10 Code:  Chronic left shoulder pain [M25.512, G89.29]  Referring practitioner: Dayan Rodriguez MD  Date of Initial Visit: Type: THERAPY  Noted: 2025  Today's Date: 2025  Patient seen for 6 sessions      Subjective:     Subjective Questionnaire: QuickDASH: 19  Clinical Progress: improved  Home Program Compliance: Yes  Treatment has included: therapeutic exercise, manual therapy, and therapeutic activity    Subjective : Walker Ordoñez reports: he is doing much better since starting therapy, he is having less pain and able to reach overhead and behind him without much pain. He does have some pain with doing higher level activities just like changing the quinteros on his jeep, but for the most part doing much better.    Current Pain 0/10      Objective    Active Range of Motion   Left Shoulder   Flexion: 150 degrees   Abduction: 150 degrees   External rotation BTH: T2   Internal rotation BTB: T10      Passive Range of Motion   Left Shoulder   Flexion: 160 degrees   Abduction: 140 degrees   External rotation 45°: 80 degrees   Internal rotation 45°: 80 degrees        Strength/Myotome Testing      Left Shoulder      Planes of Motion   Flexion: 4+   Extension: 4+   Abduction: 4+  External rotation at 0°: 4+   Internal rotation at 0°: 5     Tests      Left Shoulder   Negative Hawkin's and Neer's.        Assessment/Plan    Assessment details: The patient presents to physical therapy with complaints of left shoulder pain with s/s of impingement and weakness in the rotator cuff along with limited capsular mobility. Pt has progressed very well with therapy and is independent with his HEP. Discharge from formal therapy.      Prognosis: good     Goals  Plan Goals: SHOULDER  PROBLEMS:      1. The patient has limited ROM of the left shoulder.                LTG 1: 12 weeks:  The patient will demonstrate 150  degrees of shoulder flexion, 140 degrees of shoulder abduction, shoulder external rotation to C7, and shoulder internal rotation to T10 to allow the patient to reach into upper kitchen cabinets and manipulate clothing behind the back with greater ease.                          STATUS:  MET              STG1b:  6 weeks:  The patient will be independent with home exercises.                           STATUS:  MET                   2. The patient has limited strength of the left shoulder.              LTG 2: 12 weeks:  The patient will demonstrate 5 /5 strength for shoulder flexion, abduction, external rotation, and internal rotation in order to demonstrate improved shoulder stability.                          STATUS:  Not met              STG 2a: 6 weeks:  The patient will demonstrate 4+ /5 strength for shoulder flexion, abduction, external rotation, and internal rotation.                          STATUS: MET                                            3. The patient complains of pain to the left shoulder.              LTG 3: 12 weeks:  The patient will report a pain rating of 3 /10 or better at its worst in order to improve sleep quality and tolerance to performance of activities of daily living.                          STATUS:  MET              STG 3a: 6 weeks:  The patient will report a pain rating of 5 /10 or better at its worst.                           STATUS:  MET        4. Carrying, Moving, and Handling Objects Functional Limitation                               LTG 4: 12 weeks:  The patient will demonstrate 1-19% limitation by achieving a score of 12-19 on the QuickDASH.                          STATUS:  MET                                Progress toward previous goals: Partially Met    See Exercise, Manual, and Modality Logs for complete treatment.         Recommendations: Discharge      PT Signature: Campbell Lundy PT    Electronically signed 6/11/2025    [place] License: PT - [id number]         Timed:          Manual Therapy:    0     mins  50549;     Therapeutic Exercise:    15     mins  58469;     Neuromuscular Amor:    0    mins  18310;    Therapeutic Activity:     10     mins  27347;     Gait Trainin     mins  78142;     Ultrasound:     0     mins  44449;    Self Care                       0     mins   71948  Aquatic                          0     mins 68982          Timed Treatment:   25   mins   Total Treatment:     35   mins      I certify that the therapy services are furnished while this patient is under my care.  The services outlined above are required by this patient, and will be reviewed every 90 days.

## 2025-07-23 RX ORDER — APIXABAN 5 MG/1
5 TABLET, FILM COATED ORAL 2 TIMES DAILY
Qty: 60 TABLET | Refills: 5 | Status: SHIPPED | OUTPATIENT
Start: 2025-07-23

## 2025-08-14 ENCOUNTER — OFFICE VISIT (OUTPATIENT)
Dept: ORTHOPEDIC SURGERY | Facility: CLINIC | Age: 80
End: 2025-08-14
Payer: MEDICARE

## 2025-08-14 VITALS
WEIGHT: 191 LBS | SYSTOLIC BLOOD PRESSURE: 145 MMHG | OXYGEN SATURATION: 93 % | HEIGHT: 76 IN | DIASTOLIC BLOOD PRESSURE: 78 MMHG | HEART RATE: 65 BPM | BODY MASS INDEX: 23.26 KG/M2

## 2025-08-14 DIAGNOSIS — M19.012 PRIMARY OSTEOARTHRITIS OF LEFT SHOULDER: Primary | ICD-10-CM

## 2025-08-14 DIAGNOSIS — M75.42 IMPINGEMENT SYNDROME OF LEFT SHOULDER: ICD-10-CM

## 2025-08-14 RX ADMIN — TRIAMCINOLONE ACETONIDE 40 MG: 40 INJECTION, SUSPENSION INTRA-ARTICULAR; INTRAMUSCULAR at 14:29

## 2025-08-14 RX ADMIN — LIDOCAINE HYDROCHLORIDE 5 ML: 10 INJECTION, SOLUTION INFILTRATION; PERINEURAL at 14:29

## 2025-08-15 PROBLEM — M75.42 IMPINGEMENT SYNDROME OF LEFT SHOULDER: Status: ACTIVE | Noted: 2025-08-15

## 2025-08-15 PROBLEM — M19.012 PRIMARY OSTEOARTHRITIS OF LEFT SHOULDER: Status: ACTIVE | Noted: 2025-08-15

## 2025-08-16 RX ORDER — LIDOCAINE HYDROCHLORIDE 10 MG/ML
5 INJECTION, SOLUTION INFILTRATION; PERINEURAL
Status: COMPLETED | OUTPATIENT
Start: 2025-08-14 | End: 2025-08-14

## 2025-08-16 RX ORDER — TRIAMCINOLONE ACETONIDE 40 MG/ML
40 INJECTION, SUSPENSION INTRA-ARTICULAR; INTRAMUSCULAR
Status: COMPLETED | OUTPATIENT
Start: 2025-08-14 | End: 2025-08-14

## (undated) DEVICE — PK CYSTO CUST HAR

## (undated) DEVICE — Device

## (undated) DEVICE — SOL IRR NACL 0.9PCT 3000ML

## (undated) DEVICE — BASKT RETRV ESCAPE NITNL 1.9F 120CM

## (undated) DEVICE — FIBR LASR HOLMIUM COMPAT 272MH DISP

## (undated) DEVICE — CATH URETRL OPEN END W/CONNECT 5F 70CM

## (undated) DEVICE — SHEATH URETRL HD NAVIGATOR 12/14F 36

## (undated) DEVICE — GW PTFE FIX/CORE STFF STR .038 3X150CM

## (undated) DEVICE — CATH 2L URETRL HC 6F 50CM

## (undated) DEVICE — CATH FOL BARDEX 2WY 20F 30CC

## (undated) DEVICE — SYS IRR PUMP SGL ACTN VAC SYR 10CC

## (undated) DEVICE — CATH FOL COUDE TIEMAN 2WY 20F 5CC

## (undated) DEVICE — GW ZIPWIRE STD/SHFT STR TPR/3CM .038IN 150CM

## (undated) DEVICE — GW ZIPWIRE STD/SHFT STR TPR/3CM .035IN 150CM

## (undated) DEVICE — TRY SKINPREP SCRB CHG

## (undated) DEVICE — GW ZIPWIRE STD/SHFT ANG TPR/3CM .038X150CM

## (undated) DEVICE — TRY BASN BASIC SGL 32OZ CLR

## (undated) DEVICE — TOWL STRL OR PREWSH COTN 17X27IN BLU DISP STRL PK/4

## (undated) DEVICE — GLV SURG SENSICARE SLT PF LF 8 STRL